# Patient Record
Sex: FEMALE | Race: WHITE | NOT HISPANIC OR LATINO | Employment: FULL TIME | URBAN - METROPOLITAN AREA
[De-identification: names, ages, dates, MRNs, and addresses within clinical notes are randomized per-mention and may not be internally consistent; named-entity substitution may affect disease eponyms.]

---
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
## 2023-01-08 DIAGNOSIS — F41.9 ANXIETY: ICD-10-CM

## 2023-01-18 PROBLEM — E78.1 HYPERTRIGLYCERIDEMIA: Status: ACTIVE | Noted: 2017-05-17

## 2023-01-20 ENCOUNTER — OFFICE VISIT (OUTPATIENT)
Dept: INTERNAL MEDICINE | Facility: CLINIC | Age: 18
End: 2023-01-20
Payer: COMMERCIAL

## 2023-01-20 ENCOUNTER — PRIOR AUTHORIZATION (OUTPATIENT)
Dept: INTERNAL MEDICINE | Facility: CLINIC | Age: 18
End: 2023-01-20

## 2023-01-20 VITALS
HEART RATE: 84 BPM | BODY MASS INDEX: 32.07 KG/M2 | SYSTOLIC BLOOD PRESSURE: 151 MMHG | TEMPERATURE: 97.6 F | HEIGHT: 62 IN | OXYGEN SATURATION: 98 % | DIASTOLIC BLOOD PRESSURE: 99 MMHG | WEIGHT: 174.25 LBS

## 2023-01-20 DIAGNOSIS — E78.1 HYPERTRIGLYCERIDEMIA: ICD-10-CM

## 2023-01-20 DIAGNOSIS — F33.0 MAJOR DEPRESSIVE DISORDER, RECURRENT EPISODE, MILD DEGREE: Chronic | ICD-10-CM

## 2023-01-20 DIAGNOSIS — E11.65 TYPE 2 DIABETES MELLITUS WITH HYPERGLYCEMIA, WITHOUT LONG-TERM CURRENT USE OF INSULIN: Chronic | ICD-10-CM

## 2023-01-20 DIAGNOSIS — F41.9 ANXIETY: Chronic | ICD-10-CM

## 2023-01-20 DIAGNOSIS — I10 HYPERTENSION, UNSPECIFIED TYPE: ICD-10-CM

## 2023-01-20 DIAGNOSIS — M25.50 POLYARTHRALGIA: Primary | ICD-10-CM

## 2023-01-20 LAB
ALBUMIN SERPL-MCNC: 4.4 G/DL (ref 3.5–5.2)
ALBUMIN/GLOB SERPL: 1.6 G/DL
ALP SERPL-CCNC: 88 U/L (ref 39–117)
ALT SERPL W P-5'-P-CCNC: 39 U/L (ref 1–33)
ANION GAP SERPL CALCULATED.3IONS-SCNC: 10 MMOL/L (ref 5–15)
AST SERPL-CCNC: 51 U/L (ref 1–32)
BASOPHILS # BLD AUTO: 0.05 10*3/MM3 (ref 0–0.2)
BASOPHILS NFR BLD AUTO: 0.5 % (ref 0–1.5)
BILIRUB SERPL-MCNC: 0.2 MG/DL (ref 0–1.2)
BUN SERPL-MCNC: 14 MG/DL (ref 6–20)
BUN/CREAT SERPL: 17.1 (ref 7–25)
CALCIUM SPEC-SCNC: 9.7 MG/DL (ref 8.6–10.5)
CHLORIDE SERPL-SCNC: 102 MMOL/L (ref 98–107)
CHOLEST SERPL-MCNC: 183 MG/DL (ref 0–200)
CHROMATIN AB SERPL-ACNC: <10 IU/ML (ref 0–14)
CK SERPL-CCNC: 35 U/L
CO2 SERPL-SCNC: 23 MMOL/L (ref 22–29)
CREAT SERPL-MCNC: 0.82 MG/DL (ref 0.57–1)
CRP SERPL-MCNC: 1.61 MG/DL (ref 0–0.5)
DEPRECATED RDW RBC AUTO: 42.4 FL (ref 37–54)
EGFRCR SERPLBLD CKD-EPI 2021: 105.8 ML/MIN/1.73
EOSINOPHIL # BLD AUTO: 0.15 10*3/MM3 (ref 0–0.4)
EOSINOPHIL NFR BLD AUTO: 1.6 % (ref 0.3–6.2)
ERYTHROCYTE [DISTWIDTH] IN BLOOD BY AUTOMATED COUNT: 13.9 % (ref 12.3–15.4)
ERYTHROCYTE [SEDIMENTATION RATE] IN BLOOD: 22 MM/HR (ref 0–20)
GLOBULIN UR ELPH-MCNC: 2.7 GM/DL
GLUCOSE SERPL-MCNC: 106 MG/DL (ref 65–99)
HBA1C MFR BLD: 7.7 % (ref 4.8–5.6)
HCT VFR BLD AUTO: 40.9 % (ref 34–46.6)
HDLC SERPL-MCNC: 27 MG/DL (ref 40–60)
HGB BLD-MCNC: 13.8 G/DL (ref 12–15.9)
IMM GRANULOCYTES # BLD AUTO: 0.03 10*3/MM3 (ref 0–0.05)
IMM GRANULOCYTES NFR BLD AUTO: 0.3 % (ref 0–0.5)
LDLC SERPL CALC-MCNC: 111 MG/DL (ref 0–100)
LDLC/HDLC SERPL: 3.87 {RATIO}
LYMPHOCYTES # BLD AUTO: 3.12 10*3/MM3 (ref 0.7–3.1)
LYMPHOCYTES NFR BLD AUTO: 34.1 % (ref 19.6–45.3)
MCH RBC QN AUTO: 28.6 PG (ref 26.6–33)
MCHC RBC AUTO-ENTMCNC: 33.7 G/DL (ref 31.5–35.7)
MCV RBC AUTO: 84.7 FL (ref 79–97)
MONOCYTES # BLD AUTO: 0.58 10*3/MM3 (ref 0.1–0.9)
MONOCYTES NFR BLD AUTO: 6.3 % (ref 5–12)
NEUTROPHILS NFR BLD AUTO: 5.21 10*3/MM3 (ref 1.7–7)
NEUTROPHILS NFR BLD AUTO: 57.2 % (ref 42.7–76)
NRBC BLD AUTO-RTO: 0 /100 WBC (ref 0–0.2)
PLATELET # BLD AUTO: 358 10*3/MM3 (ref 140–450)
PMV BLD AUTO: 10 FL (ref 6–12)
POTASSIUM SERPL-SCNC: 4.3 MMOL/L (ref 3.5–5.2)
PROT SERPL-MCNC: 7.1 G/DL (ref 6–8.5)
RBC # BLD AUTO: 4.83 10*6/MM3 (ref 3.77–5.28)
SODIUM SERPL-SCNC: 135 MMOL/L (ref 136–145)
TRIGL SERPL-MCNC: 258 MG/DL (ref 0–150)
TSH SERPL DL<=0.05 MIU/L-ACNC: 3.59 UIU/ML (ref 0.27–4.2)
VLDLC SERPL-MCNC: 45 MG/DL (ref 5–40)
WBC NRBC COR # BLD: 9.14 10*3/MM3 (ref 3.4–10.8)

## 2023-01-20 PROCEDURE — 99214 OFFICE O/P EST MOD 30 MIN: CPT | Performed by: NURSE PRACTITIONER

## 2023-01-20 PROCEDURE — 83036 HEMOGLOBIN GLYCOSYLATED A1C: CPT | Performed by: NURSE PRACTITIONER

## 2023-01-20 PROCEDURE — 82550 ASSAY OF CK (CPK): CPT | Performed by: NURSE PRACTITIONER

## 2023-01-20 PROCEDURE — 86431 RHEUMATOID FACTOR QUANT: CPT | Performed by: NURSE PRACTITIONER

## 2023-01-20 PROCEDURE — 85025 COMPLETE CBC W/AUTO DIFF WBC: CPT | Performed by: NURSE PRACTITIONER

## 2023-01-20 PROCEDURE — 80061 LIPID PANEL: CPT | Performed by: NURSE PRACTITIONER

## 2023-01-20 PROCEDURE — 86200 CCP ANTIBODY: CPT | Performed by: NURSE PRACTITIONER

## 2023-01-20 PROCEDURE — 86038 ANTINUCLEAR ANTIBODIES: CPT | Performed by: NURSE PRACTITIONER

## 2023-01-20 PROCEDURE — 86140 C-REACTIVE PROTEIN: CPT | Performed by: NURSE PRACTITIONER

## 2023-01-20 PROCEDURE — 85652 RBC SED RATE AUTOMATED: CPT | Performed by: NURSE PRACTITIONER

## 2023-01-20 PROCEDURE — 80053 COMPREHEN METABOLIC PANEL: CPT | Performed by: NURSE PRACTITIONER

## 2023-01-20 PROCEDURE — 84443 ASSAY THYROID STIM HORMONE: CPT | Performed by: NURSE PRACTITIONER

## 2023-01-20 RX ORDER — PROCHLORPERAZINE 25 MG/1
1 SUPPOSITORY RECTAL TAKE AS DIRECTED
Qty: 2 EACH | Refills: 3 | Status: SHIPPED | OUTPATIENT
Start: 2023-01-20

## 2023-01-20 RX ORDER — PROCHLORPERAZINE 25 MG/1
SUPPOSITORY RECTAL
Qty: 6 EACH | Refills: 2 | Status: SHIPPED | OUTPATIENT
Start: 2023-01-20

## 2023-01-20 RX ORDER — HYDROCHLOROTHIAZIDE 25 MG/1
25 TABLET ORAL DAILY
Qty: 90 TABLET | Refills: 1 | Status: SHIPPED | OUTPATIENT
Start: 2023-01-20

## 2023-01-20 RX ORDER — PROCHLORPERAZINE 25 MG/1
1 SUPPOSITORY RECTAL TAKE AS DIRECTED
Qty: 1 EACH | Refills: 2 | Status: SHIPPED | OUTPATIENT
Start: 2023-01-20

## 2023-01-20 NOTE — TELEPHONE ENCOUNTER
PA REQUIRED FOR FOLLOWING MEDICATION:    empagliflozin (Jardiance) 10 MG tablet tablet (01/20/2023)    INDEXED VIA ONBASE

## 2023-01-20 NOTE — TELEPHONE ENCOUNTER
PA NEEDED FOR FOLLOWING MEDICATION:    Continuous Blood Gluc Transmit (Dexcom G6 Transmitter) misc (01/20/2023)

## 2023-01-20 NOTE — PROGRESS NOTES
Chief Complaint  Anxiety, Blood Sugar Problem (Was seen at [facility] for high blood sugar, has been blacking out because of blood sugar, wanting a dexcom to be able to continuously monitor, wanting to change Metformin, had been throwing up blood, needing more Ozempic, left in car and medication froze), Arthritis (Wanting to be tested for arthritis ), and Contraception (Wanting to discuss the shot or a different BC)    Subjective          Amarilis Vinson presents to [facility]  Diabetes  She presents for her follow-up diabetic visit. She has type 2 diabetes mellitus. Her disease course has been improving. Hypoglycemia symptoms include dizziness. (Checks glucose during these times and she is not hypoglycemic ) Associated symptoms include fatigue. There are no hypoglycemic complications. There are no diabetic complications. Risk factors for coronary artery disease include family history, obesity, hypertension, sedentary lifestyle and stress. Current diabetic treatments: unable to tolerate metformin. She is compliant with treatment all of the time. Her weight is stable. She is following a diabetic diet. When asked about meal planning, she reported none. She never participates in exercise. Her home blood glucose trend is decreasing steadily. Her overall blood glucose range is 140-180 mg/dl. An ACE inhibitor/angiotensin II receptor blocker is being taken. She does not see a podiatrist.Eye exam is not current.       Depression  Visit Type: follow up   Progression since onset: improving   Patient presents with the following symptoms: decreased concentration, depressed mood, excessive worry, fatigue, irritability, nervousness/anxiety and suicidal ideas.  Patient is not experiencing: anhedonia, chest pain, choking sensation, compulsions, confusion, dizziness, dry mouth, feelings of hopelessness, feelings of worthlessness, hypersomnia, hyperventilation, impotence,  insomnia, malaise, memory impairment, muscle tension, nausea, obsessions, palpitations, panic, psychomotor agitation, psychomotor retardation, restlessness, shortness of breath, suicidal planning, thoughts of death and weight gain.  Severity: mild     Hypertension  This is a chronic problem. Associated symptoms include headaches. Pertinent negatives include no anxiety, blurred vision, chest pain, malaise/fatigue, neck pain, orthopnea, palpitations, peripheral edema, PND, shortness of breath or sweats. Risk factors for coronary artery disease include obesity. Current antihypertension treatment includes ACE inhibitors. The current treatment provides mild improvement. Compliance problems include diet and exercise.      Anxiety  Presents for follow-up  visit. Symptoms include depressed mood, excessive worry, irritability and nervous/anxious behavior. Patient reports no chest pain, compulsions, confusion, decreased concentration, dizziness, dry mouth, feeling of choking, hyperventilation, impotence, insomnia, malaise, muscle tension, nausea, obsessions, palpitations, panic, restlessness, shortness of breath or suicidal ideas. The quality of sleep is fair.     Pt reports polyarthralgia that has been going on for several months. Was recently seen at .   Denies fevers, numbness, tingling.     Current Outpatient Medications   Medication Instructions   • aspirin-acetaminophen-caffeine (EXCEDRIN MIGRAINE) 250-250-65 MG per tablet Oral   • Continuous Blood Gluc  (Dexcom G6 ) device 1 each, Does not apply, Take As Directed   • Continuous Blood Gluc Sensor (Dexcom G6 Sensor) Does not apply, Every 10 Days   • Continuous Blood Gluc Transmit (Dexcom G6 Transmitter) misc 1 each, Does not apply, Take As Directed   • dicyclomine (BENTYL) 20 mg, Oral, Every 6 Hours   • empagliflozin (JARDIANCE) 10 mg, Oral, Daily   • glucose blood test strip Use three times per day as needed   • glucose monitoring kit (FREESTYLE)  "monitoring kit 1 each, Does not apply, 3 Times Daily PRN   • hydroCHLOROthiazide (HYDRODIURIL) 25 mg, Oral, Daily   • ibuprofen (ADVIL,MOTRIN) 200 MG tablet 2 tablets by mouth prn   • Lancets (freestyle) lancets Use three times per day as needed to check blood sugars   • lisinopril (PRINIVIL,ZESTRIL) 40 mg, Oral, Daily   • Lo Loestrin Fe 1 MG-10 MCG / 10 MCG tablet 1 tablet, Oral, Daily   • metFORMIN (GLUCOPHAGE) 500 mg, Oral, 2 Times Daily With Meals   • ondansetron ODT (ZOFRAN-ODT) 4 mg, Translingual, Every 8 Hours PRN   • Rimegepant Sulfate (NURTEC) 75 mg, Oral, Every 48 Hours PRN   • Semaglutide(0.25 or 0.5MG/DOS) (OZEMPIC) 0.5 mg, Subcutaneous, Weekly   • sertraline (ZOLOFT) 100 mg, Oral, Daily   • traMADol (ULTRAM) 50 mg, Oral, Every 8 Hours PRN, Take 1 tablet by mouth every 8 hours as needed for moderate pain.       The following portions of the patient's history were reviewed and updated as appropriate: allergies, current medications, past family history, past medical history, past social history, past surgical history, and problem list.    Objective   Vital Signs:   /99 (BP Location: Right arm, Patient Position: Sitting, Cuff Size: Adult)   Pulse 84   Temp 97.6 °F (36.4 °C) (Temporal)   Ht 157.5 cm (62\")   Wt 79 kg (174 lb 4 oz)   SpO2 98%   BMI 31.87 kg/m²     Wt Readings from Last 3 Encounters:   01/20/23 79 kg (174 lb 4 oz) (93 %, Z= 1.47)*   12/24/22 77.5 kg (170 lb 13.7 oz) (92 %, Z= 1.40)*   12/09/22 80 kg (176 lb 6 oz) (94 %, Z= 1.52)*     * Growth percentiles are based on [facility] (Girls, 2-20 Years) data.     BP Readings from Last 3 Encounters:   01/20/23 151/99   01/18/23 132/98   12/24/22 146/97     Physical Exam  Constitutional:       Appearance: She is obese.        Appearance: No acute distress, well-nourished  Head: normocephalic, atraumatic  Eyes: extraocular movements intact, no scleral icterus, no conjunctival injection  Ears, Nose, and Throat: external ears normal, nares patent, " moist mucous membranes  Cardiovascular: regular rate and rhythm. no murmurs, rubs, or gallops. no edema  Respiratory: breathing comfortably, symmetric chest rise, clear to auscultation bilaterally. No wheezes, rales, or rhonchi.  Neuro: alert and oriented to time, place, and person. Normal gait  Psych: normal mood and affect     Result Review :   The following data was reviewed by: JOSE Lechuga on 01/20/2023:  Common labs    Common Labs 12/9/22 12/24/22 12/24/22 1/20/23 1/20/23 1/20/23 1/20/23     0058 0058 1222 1222 1222 1222   Glucose   127 (A)    106 (A)   BUN   17    14   Creatinine   0.88    0.82   Sodium   134 (A)    135 (A)   Potassium   4.7    4.3   Chloride   97 (A)    102   Calcium   9.9    9.7   Albumin   4.40    4.4   Total Bilirubin   0.2    0.2   Alkaline Phosphatase   110    88   AST (SGOT)   45 (A)    51 (A)   ALT (SGPT)   34 (A)    39 (A)   WBC  14.07 (A)  9.14      Hemoglobin  14.9  13.8      Hematocrit  45.1  40.9      Platelets  373  358      Total Cholesterol      183    Triglycerides      258 (A)    HDL Cholesterol      27 (A)    LDL Cholesterol       111 (A)    Hemoglobin A1C     7.70 (A)     Microalbumin, Urine 2.4         (A) Abnormal value       Comments are available for some flowsheets but are not being displayed.                  Lab Results   Component Value Date    SARSANTIGEN Not Detected 01/18/2023    COVID19 NOT DETECTED 04/05/2021    RAPFLUA Negative 01/18/2023    RAPFLUB Negative 01/18/2023    POCPREGUR Negative 10/12/2022    BILIRUBINUR Negative 11/30/2022       Procedures        Assessment and Plan    Diagnoses and all orders for this visit:    1. Polyarthralgia (Primary)  -     MONTY Direct Reflex to 11 Biomarker  -     Cyclic citrul peptide antibody, IgG/IgA  -     CK  -     C-reactive protein  -     Rheumatoid factor  -     Sedimentation rate    2. Hypertension, unspecified type  Comments:  increase HCTZ to 25   Orders:  -     hydroCHLOROthiazide (HYDRODIURIL) 25  MG tablet; Take 1 tablet by mouth Daily.  Dispense: 90 tablet; Refill: 1  -     CBC & Differential    3. Type 2 diabetes mellitus with hyperglycemia, without long-term current use of insulin (HCC)  Comments:  adding jardiance to regimen as pt was unable to tolerate metformin   Orders:  -     CBC & Differential  -     Continuous Blood Gluc  (Dexcom G6 ) device; 1 each Take As Directed.  Dispense: 1 each; Refill: 2  -     Continuous Blood Gluc Sensor (Dexcom G6 Sensor); Every 10 (Ten) Days.  Dispense: 6 each; Refill: 2  -     Continuous Blood Gluc Transmit (Dexcom G6 Transmitter) misc; 1 each Take As Directed.  Dispense: 2 each; Refill: 3  -     empagliflozin (Jardiance) 10 MG tablet tablet; Take 1 tablet by mouth Daily.  Dispense: 90 tablet; Refill: 0  -     Hemoglobin A1c  -     Semaglutide,0.25 or 0.5MG/DOS, (OZEMPIC) 2 MG/1.5ML solution pen-injector; Inject 0.5 mg under the skin into the appropriate area as directed 1 (One) Time Per Week.  Dispense: 1.5 mL; Refill: 0    4. Major depressive disorder, recurrent episode, mild degree (HCC)  Comments:  well controlled on current regimen  Orders:  -     TSH Rfx On Abnormal To Free T4  -     Comprehensive Metabolic Panel  -     CBC & Differential    5. Hypertriglyceridemia  -     Lipid Panel    6. Anxiety  Comments:  well controlled on current regimen  Orders:  -     TSH Rfx On Abnormal To Free T4  -     Comprehensive Metabolic Panel          Medications Discontinued During This Encounter   Medication Reason   • hydroCHLOROthiazide (HYDRODIURIL) 12.5 MG oral    • Semaglutide,0.25 or 0.5MG/DOS, (OZEMPIC) 2 MG/1.5ML solution pen-injector           Follow Up   Return in about 4 weeks (around 2/17/2023) for Hypertension.  Patient was given instructions and counseling regarding her condition or for health maintenance advice. Please see specific information pulled into the AVS if appropriate.       Jasmin Vickers, JOSE  01/23/23  08:36 EST

## 2023-01-23 ENCOUNTER — TELEPHONE (OUTPATIENT)
Dept: INTERNAL MEDICINE | Facility: CLINIC | Age: 18
End: 2023-01-23
Payer: COMMERCIAL

## 2023-01-23 LAB
ANA SER QL: NEGATIVE
CCP IGA+IGG SERPL IA-ACNC: 1 UNITS (ref 0–19)

## 2023-01-23 NOTE — TELEPHONE ENCOUNTER
Caller: Amarilis Vinson    Relationship to patient: Self    Best call back number: [phone]    Patient is needing: PATIENT IS NEEDING PRIOR AUTHORIZATION FOR Continuous Blood Gluc Transmit (Dexcom G6 Transmitter) misc AND EVERYTHING THAT GOES WITH THAT. PLEASE ADVISE PATIENT AS TO WHEN THIS WILL BE DONE. PATIENT ASKED TO NOT CALL UNTIL AFTER 3PM TODAY           3

## 2023-01-26 ENCOUNTER — TELEPHONE (OUTPATIENT)
Dept: INTERNAL MEDICINE | Facility: CLINIC | Age: 18
End: 2023-01-26
Payer: COMMERCIAL

## 2023-01-26 NOTE — TELEPHONE ENCOUNTER
----- Message from JOSE Lechuga sent at 1/26/2023  9:42 AM EST -----  Liver function slightly elevated, but stable from previous.   Recommend healthy diet and increasing physical activity.   Avoid excessive tylenol use and alcohol.   Cholesterol levels elevated.   It is recommended with patient's diabetes that she be on a statin. I will send to pharmacy.     A1C down to 7.7, very close to goal! I would like to see her under 7.   Jardiance was approved by insurance, please start taking daily and we will recheck at the 3 month courtney.     Mildly elevated inflammatory markers. Would like to repeat these at next visit.

## 2023-03-03 ENCOUNTER — OFFICE VISIT (OUTPATIENT)
Dept: INTERNAL MEDICINE | Facility: CLINIC | Age: 18
End: 2023-03-03
Payer: COMMERCIAL

## 2023-03-03 VITALS
SYSTOLIC BLOOD PRESSURE: 170 MMHG | HEART RATE: 108 BPM | DIASTOLIC BLOOD PRESSURE: 142 MMHG | BODY MASS INDEX: 32.04 KG/M2 | HEIGHT: 62 IN | WEIGHT: 174.13 LBS | OXYGEN SATURATION: 98 % | TEMPERATURE: 97.8 F

## 2023-03-03 DIAGNOSIS — E78.1 HYPERTRIGLYCERIDEMIA: ICD-10-CM

## 2023-03-03 DIAGNOSIS — E66.9 CLASS 1 OBESITY WITH SERIOUS COMORBIDITY AND BODY MASS INDEX (BMI) OF 32.0 TO 32.9 IN ADULT, UNSPECIFIED OBESITY TYPE: ICD-10-CM

## 2023-03-03 DIAGNOSIS — I10 HYPERTENSION, UNSPECIFIED TYPE: ICD-10-CM

## 2023-03-03 DIAGNOSIS — F33.0 MAJOR DEPRESSIVE DISORDER, RECURRENT EPISODE, MILD DEGREE: Primary | ICD-10-CM

## 2023-03-03 DIAGNOSIS — E11.65 TYPE 2 DIABETES MELLITUS WITH HYPERGLYCEMIA, WITHOUT LONG-TERM CURRENT USE OF INSULIN: ICD-10-CM

## 2023-03-03 PROCEDURE — 99214 OFFICE O/P EST MOD 30 MIN: CPT | Performed by: NURSE PRACTITIONER

## 2023-03-03 NOTE — PROGRESS NOTES
Chief Complaint  Hypertension and Diabetes    Subjective          Amarilis Vinson presents to [facility]  History of Present Illness    Diabetes  She presents for her follow-up diabetic visit. She has type 2 diabetes mellitus. Her disease course has been improving. Hypoglycemia symptoms include dizziness. (Checks glucose during these times and she is not hypoglycemic ) Associated symptoms include fatigue. There are no hypoglycemic complications. There are no diabetic complications. Risk factors for coronary artery disease include family history, obesity, hypertension, sedentary lifestyle and stress. Current diabetic treatments: unable to tolerate metformin. She is compliant with treatment all of the time. Her weight is stable. She is following a diabetic diet. When asked about meal planning, she reported none. She never participates in exercise. Her home blood glucose trend is decreasing steadily. Her overall blood glucose range is 140-180 mg/dl. An ACE inhibitor/angiotensin II receptor blocker is being taken. She does not see a podiatrist.Eye exam is not current.       Depression  Visit Type: follow up   Progression since onset: improving   Patient presents with the following symptoms: decreased concentration, depressed mood, excessive worry, fatigue, irritability, nervousness/anxiety and suicidal ideas.  Patient is not experiencing: anhedonia, chest pain, choking sensation, compulsions, confusion, dizziness, dry mouth, feelings of hopelessness, feelings of worthlessness, hypersomnia, hyperventilation, impotence, insomnia, malaise, memory impairment, muscle tension, nausea, obsessions, palpitations, panic, psychomotor agitation, psychomotor retardation, restlessness, shortness of breath, suicidal planning, thoughts of death and weight gain.  Severity: mild     Hypertension  This is a chronic problem. Associated symptoms include headaches. Pertinent negatives include no  anxiety, blurred vision, chest pain, malaise/fatigue, neck pain, orthopnea, palpitations, peripheral edema, PND, shortness of breath or sweats. Risk factors for coronary artery disease include obesity. Current antihypertension treatment includes ACE inhibitors. The current treatment provides mild improvement. Compliance problems include diet and exercise.    Has been running 120's at home.       Anxiety  Presents for follow-up  visit. Symptoms include depressed mood, excessive worry, irritability and nervous/anxious behavior. Patient reports no chest pain, compulsions, confusion, decreased concentration, dizziness, dry mouth, feeling of choking, hyperventilation, impotence, insomnia, malaise, muscle tension, nausea, obsessions, palpitations, panic, restlessness, shortness of breath or suicidal ideas. The quality of sleep is fair.     Pt reports polyarthralgia that has been going on for several months. Was recently seen at .   Denies fevers, numbness, tingling.         Current Outpatient Medications   Medication Instructions   • aspirin-acetaminophen-caffeine (EXCEDRIN MIGRAINE) 250-250-65 MG per tablet Oral   • Continuous Blood Gluc  (Dexcom G6 ) device 1 each, Does not apply, Take As Directed   • Continuous Blood Gluc Sensor (Dexcom G6 Sensor) Does not apply, Every 10 Days   • Continuous Blood Gluc Transmit (Dexcom G6 Transmitter) misc 1 each, Does not apply, Take As Directed   • dicyclomine (BENTYL) 20 mg, Oral, Every 6 Hours   • empagliflozin (JARDIANCE) 10 mg, Oral, Daily   • glucose blood test strip Use three times per day as needed   • glucose monitoring kit (FREESTYLE) monitoring kit 1 each, Does not apply, 3 Times Daily PRN   • hydroCHLOROthiazide (HYDRODIURIL) 25 mg, Oral, Daily   • ibuprofen (ADVIL,MOTRIN) 200 MG tablet 2 tablets by mouth prn   • Lancets (freestyle) lancets Use three times per day as needed to check blood sugars   • lisinopril (PRINIVIL,ZESTRIL) 40 mg, Oral, Daily   • Lo  "Loestrin Fe 1 MG-10 MCG / 10 MCG tablet 1 tablet, Oral, Daily   • ondansetron ODT (ZOFRAN-ODT) 4 mg, Translingual, Every 8 Hours PRN   • Rimegepant Sulfate (NURTEC) 75 mg, Oral, Every 48 Hours PRN   • Semaglutide(0.25 or 0.5MG/DOS) (OZEMPIC) 0.25 mg, Subcutaneous, Weekly   • sertraline (ZOLOFT) 100 mg, Oral, Daily   • traMADol (ULTRAM) 50 mg, Oral, Every 8 Hours PRN, Take 1 tablet by mouth every 8 hours as needed for moderate pain.       The following portions of the patient's history were reviewed and updated as appropriate: allergies, current medications, past family history, past medical history, past social history, past surgical history, and problem list.    Objective   Vital Signs:   BP (!) 170/142 (BP Location: Left arm, Patient Position: Sitting, Cuff Size: Adult)   Pulse 108   Temp 97.8 °F (36.6 °C) (Temporal)   Ht 157.5 cm (62\")   Wt 79 kg (174 lb 2 oz)   SpO2 98%   BMI 31.85 kg/m²     Wt Readings from Last 3 Encounters:   03/03/23 79 kg (174 lb 2 oz)   01/20/23 79 kg (174 lb 4 oz) (93 %, Z= 1.47)*   12/24/22 77.5 kg (170 lb 13.7 oz) (92 %, Z= 1.40)*     * Growth percentiles are based on CDC (Girls, 2-20 Years) data.     BP Readings from Last 3 Encounters:   03/03/23 (!) 170/142   01/20/23 151/99   01/18/23 132/98     Physical Exam  HENT:      Mouth/Throat:      Pharynx: No posterior oropharyngeal erythema.   Cardiovascular:      Pulses:           Dorsalis pedis pulses are 2+ on the right side and 2+ on the left side.   Feet:      Right foot:      Protective Sensation: 3 sites tested. 3 sites sensed.      Skin integrity: Skin integrity normal. No ulcer or blister.      Toenail Condition: Right toenails are normal.      Left foot:      Protective Sensation: 3 sites tested. 3 sites sensed.      Skin integrity: Skin integrity normal. No ulcer or blister.      Toenail Condition: Left toenails are normal.      Comments:           Appearance: No acute distress, well-nourished  Head: normocephalic, " atraumatic  Eyes: extraocular movements intact, no scleral icterus, no conjunctival injection  Ears, Nose, and Throat: external ears normal, nares patent, moist mucous membranes  Cardiovascular: regular rate and rhythm. no murmurs, rubs, or gallops. no edema  Respiratory: breathing comfortably, symmetric chest rise, clear to auscultation bilaterally. No wheezes, rales, or rhonchi.  Neuro: alert and oriented to time, place, and person. Normal gait  Psych: normal mood and affect     Result Review :   The following data was reviewed by: JOSE Lechuga on 03/03/2023:  Common labs    Common Labs 12/9/22 12/24/22 12/24/22 1/20/23 1/20/23 1/20/23 1/20/23     0058 0058 1222 1222 1222 1222   Glucose   127 (A)    106 (A)   BUN   17    14   Creatinine   0.88    0.82   Sodium   134 (A)    135 (A)   Potassium   4.7    4.3   Chloride   97 (A)    102   Calcium   9.9    9.7   Albumin   4.40    4.4   Total Bilirubin   0.2    0.2   Alkaline Phosphatase   110    88   AST (SGOT)   45 (A)    51 (A)   ALT (SGPT)   34 (A)    39 (A)   WBC  14.07 (A)  9.14      Hemoglobin  14.9  13.8      Hematocrit  45.1  40.9      Platelets  373  358      Total Cholesterol      183    Triglycerides      258 (A)    HDL Cholesterol      27 (A)    LDL Cholesterol       111 (A)    Hemoglobin A1C     7.70 (A)     Microalbumin, Urine 2.4         (A) Abnormal value       Comments are available for some flowsheets but are not being displayed.                  Lab Results   Component Value Date    SARSANTIGEN Not Detected 01/18/2023    COVID19 NOT DETECTED 04/05/2021    RAPFLUA Negative 01/18/2023    RAPFLUB Negative 01/18/2023    POCPREGUR Negative 10/12/2022    BILIRUBINUR Negative 11/30/2022       Procedures        Assessment and Plan    Diagnoses and all orders for this visit:    1. Major depressive disorder, recurrent episode, mild degree (HCC) (Primary)    2. Class 1 obesity with serious comorbidity and body mass index (BMI) of 32.0 to 32.9 in  adult, unspecified obesity type  Assessment & Plan:  Patient's (Body mass index is 31.85 kg/m².) indicates that they are obese (BMI >30) with health conditions that include hypertension, diabetes mellitus and dyslipidemias . Weight is unchanged. BMI  is above average; BMI management plan is completed. We discussed low calorie, low carb based diet program, portion control and increasing exercise.       3. Type 2 diabetes mellitus with hyperglycemia, without long-term current use of insulin (HCC)  Assessment & Plan:  Diabetes is has not picked up jardiance. .   Continue current treatment regimen.  Reminded to bring in blood sugar diary at next visit.  Dietary recommendations for ADA diet.  Regular aerobic exercise.  Discussed ways to avoid symptomatic hypoglycemia.  Discussed sick day management.  Discussed foot care.  Diabetes will be reassessed in 3 months.    Orders:  -     Semaglutide,0.25 or 0.5MG/DOS, (OZEMPIC) 2 MG/1.5ML solution pen-injector; Inject 0.25 mg under the skin into the appropriate area as directed 1 (One) Time Per Week.  Dispense: 2 mL; Refill: 1    4. Hypertension, unspecified type  Assessment & Plan:  Hypertension is uncontrolled. has not been taking lisinopril .  Continue current treatment regimen.  Dietary sodium restriction.  Weight loss.  Regular aerobic exercise.  Blood pressure will be reassessed in 4 weeks.    Takes meds as prescribed.       5. Hypertriglyceridemia        Medications Discontinued During This Encounter   Medication Reason   • metFORMIN (Glucophage) 500 MG tablet *Therapy completed   • Semaglutide,0.25 or 0.5MG/DOS, (OZEMPIC) 2 MG/1.5ML solution pen-injector           Follow Up   Return in about 4 weeks (around 3/31/2023).  Patient was given instructions and counseling regarding her condition or for health maintenance advice. Please see specific information pulled into the AVS if appropriate.       Jasmin Vickers, JOSE  03/03/23  12:22 EST

## 2023-03-03 NOTE — ASSESSMENT & PLAN NOTE
Diabetes is has not picked up jardiance. .   Continue current treatment regimen.  Reminded to bring in blood sugar diary at next visit.  Dietary recommendations for ADA diet.  Regular aerobic exercise.  Discussed ways to avoid symptomatic hypoglycemia.  Discussed sick day management.  Discussed foot care.  Diabetes will be reassessed in 3 months.

## 2023-03-03 NOTE — ASSESSMENT & PLAN NOTE
Patient's (Body mass index is 31.85 kg/m².) indicates that they are obese (BMI >30) with health conditions that include hypertension, diabetes mellitus and dyslipidemias . Weight is unchanged. BMI  is above average; BMI management plan is completed. We discussed low calorie, low carb based diet program, portion control and increasing exercise.

## 2023-03-03 NOTE — ASSESSMENT & PLAN NOTE
Hypertension is uncontrolled. has not been taking lisinopril .  Continue current treatment regimen.  Dietary sodium restriction.  Weight loss.  Regular aerobic exercise.  Blood pressure will be reassessed in 4 weeks.    Takes meds as prescribed.

## 2023-03-21 ENCOUNTER — APPOINTMENT (OUTPATIENT)
Dept: CT IMAGING | Facility: HOSPITAL | Age: 18
End: 2023-03-21
Payer: COMMERCIAL

## 2023-03-21 ENCOUNTER — HOSPITAL ENCOUNTER (EMERGENCY)
Facility: HOSPITAL | Age: 18
Discharge: HOME | End: 2023-03-22
Attending: EMERGENCY MEDICINE | Admitting: EMERGENCY MEDICINE
Payer: COMMERCIAL

## 2023-03-21 DIAGNOSIS — K57.92 DIVERTICULITIS: ICD-10-CM

## 2023-03-21 DIAGNOSIS — R11.2 NAUSEA AND VOMITING, UNSPECIFIED VOMITING TYPE: ICD-10-CM

## 2023-03-21 DIAGNOSIS — I10 HYPERTENSION, UNSPECIFIED TYPE: Primary | ICD-10-CM

## 2023-03-21 DIAGNOSIS — R10.30 LOWER ABDOMINAL PAIN: ICD-10-CM

## 2023-03-21 LAB
ALBUMIN SERPL-MCNC: 4.3 G/DL (ref 3.5–5.2)
ALBUMIN/GLOB SERPL: 1.3 G/DL
ALP SERPL-CCNC: 105 U/L (ref 39–117)
ALT SERPL W P-5'-P-CCNC: 84 U/L (ref 1–33)
ANION GAP SERPL CALCULATED.3IONS-SCNC: 15 MMOL/L (ref 5–15)
AST SERPL-CCNC: 213 U/L (ref 1–32)
BASOPHILS # BLD AUTO: 0.03 10*3/MM3 (ref 0–0.2)
BASOPHILS NFR BLD AUTO: 0.3 % (ref 0–1.5)
BILIRUB SERPL-MCNC: 0.3 MG/DL (ref 0–1.2)
BILIRUB UR QL STRIP: NEGATIVE
BUN SERPL-MCNC: 13 MG/DL (ref 6–20)
BUN/CREAT SERPL: 14.4 (ref 7–25)
CALCIUM SPEC-SCNC: 9.4 MG/DL (ref 8.6–10.5)
CHLORIDE SERPL-SCNC: 101 MMOL/L (ref 98–107)
CLARITY UR: CLEAR
CO2 SERPL-SCNC: 20 MMOL/L (ref 22–29)
COLOR UR: YELLOW
CREAT SERPL-MCNC: 0.9 MG/DL (ref 0.57–1)
DEPRECATED RDW RBC AUTO: 33.4 FL (ref 37–54)
EGFRCR SERPLBLD CKD-EPI 2021: 94.1 ML/MIN/1.73
EOSINOPHIL # BLD AUTO: 0.07 10*3/MM3 (ref 0–0.4)
EOSINOPHIL NFR BLD AUTO: 0.7 % (ref 0.3–6.2)
ERYTHROCYTE [DISTWIDTH] IN BLOOD BY AUTOMATED COUNT: 11.6 % (ref 12.3–15.4)
GLOBULIN UR ELPH-MCNC: 3.3 GM/DL
GLUCOSE BLDC GLUCOMTR-MCNC: 128 MG/DL (ref 70–99)
GLUCOSE SERPL-MCNC: 119 MG/DL (ref 65–99)
GLUCOSE UR STRIP-MCNC: ABNORMAL MG/DL
HCG INTACT+B SERPL-ACNC: <0.5 MIU/ML
HCT VFR BLD AUTO: 44.4 % (ref 34–46.6)
HGB BLD-MCNC: 15.9 G/DL (ref 12–15.9)
HGB UR QL STRIP.AUTO: NEGATIVE
HOLD SPECIMEN: NORMAL
HOLD SPECIMEN: NORMAL
IMM GRANULOCYTES # BLD AUTO: 0.03 10*3/MM3 (ref 0–0.05)
IMM GRANULOCYTES NFR BLD AUTO: 0.3 % (ref 0–0.5)
KETONES UR QL STRIP: ABNORMAL
LEUKOCYTE ESTERASE UR QL STRIP.AUTO: NEGATIVE
LIPASE SERPL-CCNC: 18 U/L (ref 13–60)
LYMPHOCYTES # BLD AUTO: 1.65 10*3/MM3 (ref 0.7–3.1)
LYMPHOCYTES NFR BLD AUTO: 15.5 % (ref 19.6–45.3)
MCH RBC QN AUTO: 28.5 PG (ref 26.6–33)
MCHC RBC AUTO-ENTMCNC: 35.8 G/DL (ref 31.5–35.7)
MCV RBC AUTO: 79.6 FL (ref 79–97)
MONOCYTES # BLD AUTO: 0.73 10*3/MM3 (ref 0.1–0.9)
MONOCYTES NFR BLD AUTO: 6.9 % (ref 5–12)
NEUTROPHILS NFR BLD AUTO: 76.3 % (ref 42.7–76)
NEUTROPHILS NFR BLD AUTO: 8.14 10*3/MM3 (ref 1.7–7)
NITRITE UR QL STRIP: NEGATIVE
NRBC BLD AUTO-RTO: 0 /100 WBC (ref 0–0.2)
PH UR STRIP.AUTO: 5.5 [PH] (ref 5–8)
PLATELET # BLD AUTO: 307 10*3/MM3 (ref 140–450)
PMV BLD AUTO: 9.3 FL (ref 6–12)
POTASSIUM SERPL-SCNC: 4 MMOL/L (ref 3.5–5.2)
PROT SERPL-MCNC: 7.6 G/DL (ref 6–8.5)
PROT UR QL STRIP: ABNORMAL
RBC # BLD AUTO: 5.58 10*6/MM3 (ref 3.77–5.28)
SODIUM SERPL-SCNC: 136 MMOL/L (ref 136–145)
SP GR UR STRIP: >1.03 (ref 1–1.03)
UROBILINOGEN UR QL STRIP: ABNORMAL
WBC NRBC COR # BLD: 10.65 10*3/MM3 (ref 3.4–10.8)
WHOLE BLOOD HOLD COAG: NORMAL
WHOLE BLOOD HOLD SPECIMEN: NORMAL

## 2023-03-21 PROCEDURE — 96374 THER/PROPH/DIAG INJ IV PUSH: CPT

## 2023-03-21 PROCEDURE — 81003 URINALYSIS AUTO W/O SCOPE: CPT

## 2023-03-21 PROCEDURE — 99284 EMERGENCY DEPT VISIT MOD MDM: CPT

## 2023-03-21 PROCEDURE — 82962 GLUCOSE BLOOD TEST: CPT

## 2023-03-21 PROCEDURE — 85025 COMPLETE CBC W/AUTO DIFF WBC: CPT

## 2023-03-21 PROCEDURE — 36415 COLL VENOUS BLD VENIPUNCTURE: CPT

## 2023-03-21 PROCEDURE — 96361 HYDRATE IV INFUSION ADD-ON: CPT

## 2023-03-21 PROCEDURE — 80053 COMPREHEN METABOLIC PANEL: CPT

## 2023-03-21 PROCEDURE — 83690 ASSAY OF LIPASE: CPT

## 2023-03-21 PROCEDURE — 96375 TX/PRO/DX INJ NEW DRUG ADDON: CPT

## 2023-03-21 PROCEDURE — 84702 CHORIONIC GONADOTROPIN TEST: CPT

## 2023-03-21 RX ORDER — SODIUM CHLORIDE 0.9 % (FLUSH) 0.9 %
10 SYRINGE (ML) INJECTION AS NEEDED
Status: DISCONTINUED | OUTPATIENT
Start: 2023-03-21 | End: 2023-03-22 | Stop reason: HOSPADM

## 2023-03-22 ENCOUNTER — APPOINTMENT (OUTPATIENT)
Dept: CT IMAGING | Facility: HOSPITAL | Age: 18
End: 2023-03-22
Payer: COMMERCIAL

## 2023-03-22 VITALS
WEIGHT: 173.06 LBS | HEART RATE: 100 BPM | HEIGHT: 62 IN | TEMPERATURE: 98.3 F | OXYGEN SATURATION: 95 % | BODY MASS INDEX: 31.85 KG/M2 | SYSTOLIC BLOOD PRESSURE: 133 MMHG | RESPIRATION RATE: 20 BRPM | DIASTOLIC BLOOD PRESSURE: 90 MMHG

## 2023-03-22 PROCEDURE — 25010000002 ONDANSETRON PER 1 MG: Performed by: NURSE PRACTITIONER

## 2023-03-22 PROCEDURE — 74177 CT ABD & PELVIS W/CONTRAST: CPT

## 2023-03-22 PROCEDURE — 25010000002 KETOROLAC TROMETHAMINE PER 15 MG: Performed by: NURSE PRACTITIONER

## 2023-03-22 PROCEDURE — 25010000002 HYDRALAZINE PER 20 MG: Performed by: NURSE PRACTITIONER

## 2023-03-22 PROCEDURE — 25510000001 IOPAMIDOL PER 1 ML: Performed by: EMERGENCY MEDICINE

## 2023-03-22 RX ORDER — ONDANSETRON 4 MG/1
4 TABLET, ORALLY DISINTEGRATING ORAL EVERY 4 HOURS PRN
Qty: 15 TABLET | Refills: 0 | Status: SHIPPED | OUTPATIENT
Start: 2023-03-22

## 2023-03-22 RX ORDER — KETOROLAC TROMETHAMINE 30 MG/ML
30 INJECTION, SOLUTION INTRAMUSCULAR; INTRAVENOUS ONCE
Status: COMPLETED | OUTPATIENT
Start: 2023-03-22 | End: 2023-03-22

## 2023-03-22 RX ORDER — AMOXICILLIN AND CLAVULANATE POTASSIUM 875; 125 MG/1; MG/1
1 TABLET, FILM COATED ORAL ONCE
Status: COMPLETED | OUTPATIENT
Start: 2023-03-22 | End: 2023-03-22

## 2023-03-22 RX ORDER — HYDRALAZINE HYDROCHLORIDE 20 MG/ML
20 INJECTION INTRAMUSCULAR; INTRAVENOUS ONCE
Status: COMPLETED | OUTPATIENT
Start: 2023-03-22 | End: 2023-03-22

## 2023-03-22 RX ORDER — ONDANSETRON 2 MG/ML
4 INJECTION INTRAMUSCULAR; INTRAVENOUS ONCE
Status: COMPLETED | OUTPATIENT
Start: 2023-03-22 | End: 2023-03-22

## 2023-03-22 RX ORDER — DICYCLOMINE HYDROCHLORIDE 10 MG/1
20 CAPSULE ORAL ONCE
Status: COMPLETED | OUTPATIENT
Start: 2023-03-22 | End: 2023-03-22

## 2023-03-22 RX ORDER — AMOXICILLIN AND CLAVULANATE POTASSIUM 875; 125 MG/1; MG/1
1 TABLET, FILM COATED ORAL 2 TIMES DAILY
Qty: 20 TABLET | Refills: 0 | Status: SHIPPED | OUTPATIENT
Start: 2023-03-22 | End: 2023-04-01

## 2023-03-22 RX ORDER — DICYCLOMINE HCL 20 MG
20 TABLET ORAL EVERY 6 HOURS
Qty: 20 TABLET | Refills: 0 | Status: SHIPPED | OUTPATIENT
Start: 2023-03-22

## 2023-03-22 RX ADMIN — DICYCLOMINE HYDROCHLORIDE 20 MG: 10 CAPSULE ORAL at 03:45

## 2023-03-22 RX ADMIN — HYDRALAZINE HYDROCHLORIDE 20 MG: 20 INJECTION INTRAMUSCULAR; INTRAVENOUS at 00:21

## 2023-03-22 RX ADMIN — IOPAMIDOL 100 ML: 755 INJECTION, SOLUTION INTRAVENOUS at 01:22

## 2023-03-22 RX ADMIN — AMOXICILLIN AND CLAVULANATE POTASSIUM 1 TABLET: 875; 125 TABLET, FILM COATED ORAL at 03:45

## 2023-03-22 RX ADMIN — SODIUM CHLORIDE 1000 ML: 9 INJECTION, SOLUTION INTRAVENOUS at 00:13

## 2023-03-22 RX ADMIN — ONDANSETRON 4 MG: 2 INJECTION INTRAMUSCULAR; INTRAVENOUS at 00:14

## 2023-03-22 RX ADMIN — KETOROLAC TROMETHAMINE 30 MG: 30 INJECTION, SOLUTION INTRAMUSCULAR; INTRAVENOUS at 00:13

## 2023-03-22 NOTE — ED PROVIDER NOTES
Time: 9:47 PM EDT  Date of encounter:  3/21/2023  Independent Historian/Clinical History and Information was obtained by:   Patient  Chief Complaint   Patient presents with   • Abdominal Pain   • Nausea     Pt reports low abd pain x 3 days, worse today. Pain making her nauseated.       History is limited by: N/A    History of Present Illness:  Patient is a 18 y.o. year old female who presents to the emergency department for evaluation of abdominal pain x3 days.  Patient states her pain started in llq and now is more central.  Patient states that she has had multiple episodes of vomiting today.  Patient is a diabetic and states her blood sugar has dropped to 40 multiple times today.  Patient denies diarrhea, dysuria. No fever  (Provider in triage, Chandrakant Jorge PA-C).    History provided by:  Patient      Patient Care Team  Primary Care Provider: Jasmin Vickers APRN    Past Medical History:     No Known Allergies  Past Medical History:   Diagnosis Date   • Anxiety    • COVID 08/07/2022    NO CURRENT SYMPTOMS   • Diabetes mellitus (HCC)    • Foot pain, left    • Hypertension    • Migraines      Past Surgical History:   Procedure Laterality Date   • FOOT SURGERY      DISTAL LEFT FIRST METATARSAL   • HARDWARE REMOVAL Left 8/26/2022    Procedure: HARDWARE REMOVAL;  left first metatarsal shaft;  Surgeon: Michel Jaffe DPM;  Location: [facility];  Service: Podiatry;  Laterality: Left;   • ULNAR OSTEOPLASTY  2018     Family History   Adopted: Yes       Home Medications:  Prior to Admission medications    Medication Sig Start Date End Date Taking? Authorizing Provider   aspirin-acetaminophen-caffeine (EXCEDRIN MIGRAINE) 250-250-65 MG per tablet Take  by mouth.    Provider, MD Edison   Continuous Blood Gluc  (Dexcom G6 ) device 1 each Take As Directed. 1/20/23   Jasmin Vickers APRN   Continuous Blood Gluc Sensor (Dexcom G6 Sensor) Every 10 (Ten) Days. 1/20/23   Rancho  JOSE Castellanos   Continuous Blood Gluc Transmit (Dexcom G6 Transmitter) misc 1 each Take As Directed. 1/20/23   Jasmin Vickers APRN   dicyclomine (BENTYL) 20 MG tablet Take 1 tablet by mouth Every 6 (Six) Hours. 12/1/22   Suzi Nicolas APRN   empagliflozin (Jardiance) 10 MG tablet tablet Take 1 tablet by mouth Daily. 1/20/23   Jasmin Vickers APRN   glucose blood test strip Use three times per day as needed 12/9/22   Jasmin Vickers APRN   glucose monitoring kit (FREESTYLE) monitoring kit 1 each 3 (Three) Times a Day As Needed (Blood Sugar Check). 10/18/22 10/18/23  Jasmin Vickers APRN   hydroCHLOROthiazide (HYDRODIURIL) 25 MG tablet Take 1 tablet by mouth Daily. 1/20/23   Jasmin Vickers APRN   ibuprofen (ADVIL,MOTRIN) 200 MG tablet 2 tablets by mouth prn    Edison Aaron MD   Lancets (freestyle) lancets Use three times per day as needed to check blood sugars 10/18/22   Jasmin Vickers APRN   lisinopril (PRINIVIL,ZESTRIL) 40 MG tablet Take 1 tablet by mouth Daily. 11/9/22   Jasmin Vickers APRN   Lo Loestrin Fe 1 MG-10 MCG / 10 MCG tablet Take 1 tablet by mouth Daily. 12/30/22   Edison Aaron MD   ondansetron ODT (ZOFRAN-ODT) 4 MG disintegrating tablet Place 1 tablet on the tongue Every 8 (Eight) Hours As Needed for Nausea or Vomiting. 12/24/22   Sonia Feliz MD   Rimegepant Sulfate (NURTEC) 75 MG tablet dispersible tablet Take 1 tablet by mouth Every Other Day As Needed (migraine). 10/12/22   Jasmin Vickers APRN   Semaglutide,0.25 or 0.5MG/DOS, (OZEMPIC) 2 MG/1.5ML solution pen-injector Inject 0.25 mg under the skin into the appropriate area as directed 1 (One) Time Per Week. 3/3/23   Jasmin Vickers APRN   sertraline (Zoloft) 100 MG tablet Take 1 tablet by mouth Daily. 12/9/22   Jasmin Vickers APRN   traMADol (ULTRAM) 50 MG tablet Take 1 tablet by mouth Every 8 (Eight) Hours As Needed for Moderate Pain . Take 1 tablet by mouth  "every 8 hours as needed for moderate pain. 8/26/22   Michel Jaffe DPM        Social History:   Social History     Tobacco Use   • Smoking status: Never   • Smokeless tobacco: Never   Vaping Use   • Vaping Use: Never used   Substance Use Topics   • Alcohol use: Not Currently   • Drug use: Not Currently         Review of Systems:  Review of Systems   Constitutional: Negative for chills and fever.   HENT: Negative for congestion, ear pain and sore throat.    Eyes: Negative for pain.   Respiratory: Negative for cough, chest tightness and shortness of breath.    Cardiovascular: Negative for chest pain.   Gastrointestinal: Positive for abdominal pain, nausea and vomiting. Negative for diarrhea.   Genitourinary: Negative for dysuria, flank pain, hematuria, vaginal bleeding, vaginal discharge and vaginal pain.   Musculoskeletal: Negative for back pain and joint swelling.   Skin: Negative for pallor.   Neurological: Negative for seizures and headaches.   Hematological: Negative.    Psychiatric/Behavioral: Negative.    All other systems reviewed and are negative.       Physical Exam:  /90   Pulse 100   Temp 98.3 °F (36.8 °C)   Resp 20   Ht 157.5 cm (62\")   Wt 78.5 kg (173 lb 1 oz)   SpO2 95%   BMI 31.65 kg/m²     Physical Exam  Vitals and nursing note reviewed.   Constitutional:       General: She is not in acute distress.     Appearance: Normal appearance. She is not toxic-appearing.   HENT:      Head: Normocephalic and atraumatic.      Mouth/Throat:      Mouth: Mucous membranes are moist.   Eyes:      General: No scleral icterus.     Pupils: Pupils are equal, round, and reactive to light.   Cardiovascular:      Rate and Rhythm: Regular rhythm. Tachycardia present.      Pulses: Normal pulses.      Heart sounds: Normal heart sounds.   Pulmonary:      Effort: Pulmonary effort is normal. No respiratory distress.      Breath sounds: Normal breath sounds.   Abdominal:      General: Bowel sounds are normal. " There is no distension.      Palpations: Abdomen is soft.      Tenderness: There is abdominal tenderness in the suprapubic area and left lower quadrant. There is no right CVA tenderness or left CVA tenderness.   Musculoskeletal:         General: Normal range of motion.      Cervical back: Normal range of motion and neck supple.   Skin:     General: Skin is warm and dry.   Neurological:      General: No focal deficit present.      Mental Status: She is alert and oriented to person, place, and time. Mental status is at baseline.   Psychiatric:         Mood and Affect: Mood normal.         Behavior: Behavior normal.           Procedures:  Procedures      Medical Decision Making:      Comorbidities that affect care:    Diabetes, Hypertension    External Notes reviewed:    None      The following orders were placed and all results were independently analyzed by me:  Orders Placed This Encounter   Procedures   • CT Abdomen Pelvis With Contrast   • [place] Draw   • Comprehensive Metabolic Panel   • Lipase   • Urinalysis With Microscopic If Indicated (No Culture) - Urine, Clean Catch   • hCG, Quantitative, Pregnancy   • CBC Auto Differential   • NPO Diet NPO Type: Strict NPO   • Undress & Gown   • POC Glucose STAT   • POC Glucose Once   • Insert Peripheral IV   • CBC & Differential   • Green Top (Gel)   • Lavender Top   • Gold Top - SST   • Light Blue Top       Medications Given in the Emergency Department:  Medications   sodium chloride 0.9 % flush 10 mL (has no administration in time range)   ondansetron (ZOFRAN) injection 4 mg (4 mg Intravenous Given 3/22/23 0014)   ketorolac (TORADOL) injection 30 mg (30 mg Intravenous Given 3/22/23 0013)   sodium chloride 0.9 % bolus 1,000 mL (0 mL Intravenous Stopped 3/22/23 0203)   hydrALAZINE (APRESOLINE) injection 20 mg (20 mg Intravenous Given 3/22/23 0021)   iopamidol (ISOVUE-370) 76 % injection 100 mL (100 mL Intravenous Given 3/22/23 0122)   amoxicillin-clavulanate (AUGMENTIN)  875-125 MG per tablet 1 tablet (1 tablet Oral Given 3/22/23 0345)   dicyclomine (BENTYL) capsule 20 mg (20 mg Oral Given 3/22/23 0345)        ED Course:    The patient was initially evaluated in the triage area where orders were placed. The patient was later dispositioned by JOSE Guardado.      The patient was advised to stay for completion of workup which includes but is not limited to communication of labs and radiological results, reassessment and plan. The patient was advised that leaving prior to disposition by a provider could result in critical findings that are not communicated to the patient.          Labs:    Lab Results (last 24 hours)     Procedure Component Value Units Date/Time    CBC & Differential [[id number]]  (Abnormal) Collected: 03/21/23 2130    Specimen: Blood Updated: 03/21/23 2156    Narrative:      The following orders were created for panel order CBC & Differential.  Procedure                               Abnormality         Status                     ---------                               -----------         ------                     CBC Auto Differential[[id number]]        Abnormal            Final result                 Please view results for these tests on the individual orders.    Comprehensive Metabolic Panel [[id number]]  (Abnormal) Collected: 03/21/23 2130    Specimen: Blood Updated: 03/21/23 2216     Glucose 119 mg/dL      BUN 13 mg/dL      Creatinine 0.90 mg/dL      Sodium 136 mmol/L      Potassium 4.0 mmol/L      Chloride 101 mmol/L      CO2 20.0 mmol/L      Calcium 9.4 mg/dL      Total Protein 7.6 g/dL      Albumin 4.3 g/dL      ALT (SGPT) 84 U/L      AST (SGOT) 213 U/L      Alkaline Phosphatase 105 U/L      Total Bilirubin 0.3 mg/dL      Globulin 3.3 gm/dL      A/G Ratio 1.3 g/dL      BUN/Creatinine Ratio 14.4     Anion Gap 15.0 mmol/L      eGFR 94.1 mL/min/1.73     Narrative:      GFR Normal >60  Chronic Kidney Disease <60  Kidney Failure <15      Lipase [[id number]]   (Normal) Collected: 03/21/23 2130    Specimen: Blood Updated: 03/21/23 2216     Lipase 18 U/L     hCG, Quantitative, Pregnancy [[id number]] Collected: 03/21/23 2130    Specimen: Blood Updated: 03/21/23 2214     HCG Quantitative <0.50 mIU/mL     Narrative:      HCG Ranges by Gestational Age    Females - non-pregnant premenopausal   </= 1mIU/mL HCG  Females - postmenopausal               </= 7mIU/mL HCG    3 Weeks       5.4   -      72 mIU/mL  4 Weeks      10.2   -     708 mIU/mL  5 Weeks       217   -   8,245 mIU/mL  6 Weeks       152   -  32,177 mIU/mL  7 Weeks     4,059   - 153,767 mIU/mL  8 Weeks    31,366   - 149,094 mIU/mL  9 Weeks    59,109   - 135,901 mIU/mL  10 Weeks   44,186   - 170,409 mIU/mL  12 Weeks   27,107   - 201,615 mIU/mL  14 Weeks   24,302   -  93,646 mIU/mL  15 Weeks   12,540   -  69,747 mIU/mL  16 Weeks    8,904   -  55,332 mIU/mL  17 Weeks    8,240   -  51,793 mIU/mL  18 Weeks    9,649   -  55,271 mIU/mL      CBC Auto Differential [[id number]]  (Abnormal) Collected: 03/21/23 2130    Specimen: Blood Updated: 03/21/23 2156     WBC 10.65 10*3/mm3      RBC 5.58 10*6/mm3      Hemoglobin 15.9 g/dL      Hematocrit 44.4 %      MCV 79.6 fL      MCH 28.5 pg      MCHC 35.8 g/dL      RDW 11.6 %      RDW-SD 33.4 fl      MPV 9.3 fL      Platelets 307 10*3/mm3      Neutrophil % 76.3 %      Lymphocyte % 15.5 %      Monocyte % 6.9 %      Eosinophil % 0.7 %      Basophil % 0.3 %      Immature Grans % 0.3 %      Neutrophils, Absolute 8.14 10*3/mm3      Lymphocytes, Absolute 1.65 10*3/mm3      Monocytes, Absolute 0.73 10*3/mm3      Eosinophils, Absolute 0.07 10*3/mm3      Basophils, Absolute 0.03 10*3/mm3      Immature Grans, Absolute 0.03 10*3/mm3      nRBC 0.0 /100 WBC     Urinalysis With Microscopic If Indicated (No Culture) - [[id number]]  (Abnormal) Collected: 03/21/23 2137    Specimen: Urine Updated: 03/21/23 2209     Color, UA Yellow     Appearance, UA Clear     pH, UA 5.5     Specific Gravity, UA >1.030      Glucose, UA >=1000 mg/dL (3+)     Ketones, UA Trace     Bilirubin, UA Negative     Blood, UA Negative     Protein, UA Trace     Leuk Esterase, UA Negative     Nitrite, UA Negative     Urobilinogen, UA 0.2 E.U./dL    Narrative:      Urine microscopic not indicated.    POC Glucose Once [[id number]]  (Abnormal) Collected: 03/21/23 2204    Specimen: Blood Updated: 03/21/23 2207     Glucose 128 mg/dL      Comment: Serial Number: [id number]:  [id number]              Imaging:    CT Abdomen Pelvis With Contrast    Result Date: 3/22/2023  PROCEDURE: CT ABDOMEN PELVIS W CONTRAST  COMPARISON: 12/1/2022.  INDICATIONS: PERIUMBILICAL ABDOMINAL PAIN X 1 DAY.  TECHNIQUE: After obtaining the patient's consent, 645 CT images were created with non-ionic intravenous contrast material.  No oral contrast agent was administered for the study.  PROTOCOL:   Standard CT imaging protocol performed.    RADIATION:   Total DLP: 483.6 mGy*cm.   Automated exposure control was utilized to minimize radiation dose. CONTRAST: 100 mL Isovue 370 I.V.  FINDINGS: There is new age-indeterminate but possibly acute to subacute inflammatory change involving the mid to distal sigmoid colon within the right pelvis, near (probably just anterior to) the right adnexa.  The findings may represent acute to subacute epiploic appendagitis.  Acute to subacute colonic (sigmoid) diverticulitis cannot be excluded entirely.  There are scattered colonic diverticula.  Again, these findings are new since the prior CT study.  There is no acute appendicitis.  The appendix is seen on image 69 of series 201 and adjacent images. No acute cholecystitis or pancreatitis.  No gallstones.  No biliary ductal dilatation.  No mechanical bowel obstruction.  No definite bowel wall thickening.  No pneumoperitoneum or pneumatosis.  No portal or mesenteric venous gas.  No acute findings are seen involving the remainder of the colon.  No renal/ureteral stones or hydronephrosis is seen.  No  obstructive uropathy is identified.  No acute pyelonephritis.  A trace amount of nonspecific free fluid is seen in the cul-de-sac.  It has a CT number of about 18 Hounsfield units or less.  No suspicious uterine or adnexal mass.  No urinary bladder calculus is suggested.  No aneurysmal dilatation of the aortoiliac arterial system.  No acute intraperitoneal or retroperitoneal hemorrhage.  No enlarged intra-abdominal or intrapelvic lymph nodes.  No adrenal mass.  No acute findings are seen with regard to the liver or spleen.  There is diffuse hepatic steatosis with hepatomegaly.  Maximum craniocaudal dimension of the right lobe of the liver is 20.1 cm.  No splenomegaly is seen.  No acute fracture.  No aggressive osseous lesion.  No acute infiltrate is seen in the partially imaged lung bases.  No urinary bladder wall thickening or urinary bladder calculi are appreciated.         1. There is new age-indeterminate inflammatory change within the right pelvis, probably centered about the wxe-oi-ehstie sigmoid colon.  It may represent acute-to-subacute mild-to-moderate sigmoid diverticulitis.  Acute epiploic appendagitis is possible.  There is no pneumoperitoneum or pneumatosis.  No mechanical bowel obstruction is seen.  No portal or mesenteric venous gas is seen to suggest ischemic colitis.  No pericolonic fluid collection is seen to suggest abscess.  Consider close interval clinical and imaging follow-up of the finding to ensure a benign progression and to exclude an underlying malignant process, which is thought to be unlikely, considering the patient's age.  2. A minimal amount of nonspecific free fluid is seen in the cul-de-sac with a CT number less than 18 Hounsfield units.  It probably represents normal free physiologic fluid.   3. No acute appendicitis.   4. There is diffuse hepatic steatosis without hepatomegaly.   5. No other definite acute findings are seen in the abdomen or pelvis by enhanced CT examination.     Please note that portions of this note were completed with a voice recognition program.  MALINA BARROSO JR, MD       Electronically Signed and Approved By: MALINA BARROSO JR, MD on 3/22/2023 at 3:01                  Differential Diagnosis and Discussion:      Abdominal Pain: Based on the patient's signs and symptoms, I considered abdominal aortic aneurysm, small bowel obstruction, pancreatitis, acute cholecystitis, acute appendecitis, peptic ulcer disease, gastritis, colitis, endocrine disorders, irritable bowel syndrome and other differential diagnosis an etiology of the patient's abdominal pain.    All labs were reviewed and interpreted by me.  CT scan radiology interpretation was reviewed by me.    MDM  Number of Diagnoses or Management Options  Diverticulitis  Hypertension, unspecified type  Lower abdominal pain  Nausea and vomiting, unspecified vomiting type  Diagnosis management comments: The patient is resting comfortably and feels better, is alert and in no distress. Repeat examination is unremarkable and benign; in particular, there's no discomfort at McBurney's point and there is no pulsatile mass. The history, exam, diagnostic testing, and current condition does not suggest acute appendicitis, bowel obstruction, acute cholecystitis, bowel perforation, major gastrointestinal bleeding, severe diverticulitis, abdominal aortic aneurysm, mesenteric ischemia, volvulus, sepsis, or other significant pathology that warrants further testing, continued ED treatment, admission, for surgical evaluation at this point. The vital signs have been stable. The patient does not have uncontrollable pain, intractable vomiting, or other significant symptoms. The patient's condition is stable and appropriate for discharge from the emergency department.         Amount and/or Complexity of Data Reviewed  Clinical lab tests: reviewed and ordered  Tests in the radiology section of CPT®: reviewed and ordered  Tests in the medicine  section of CPT®: ordered and reviewed  Decide to obtain previous medical records or to obtain history from someone other than the patient: yes    Risk of Complications, Morbidity, and/or Mortality  Presenting problems: moderate  Diagnostic procedures: moderate  Management options: low    Patient Progress  Patient progress: stable         Patient Care Considerations:    CONSULT: I considered consulting Gastroenterology or hospitalist, however Patient is tolerating p.o. pain is minimal and reticulitis is mild.  Patient is tolerating p.o.      Consultants/Shared Management Plan:    None    Social Determinants of Health:    Patient is independent, reliable, and has access to care.       Disposition and Care Coordination:    Discharged: The patient is suitable and stable for discharge with no need for consideration of observation or admission.    I have explained the patient´s condition, diagnoses and treatment plan based on the information available to me at this time. I have answered questions and addressed any concerns. The patient has a good  understanding of the patient´s diagnosis, condition, and treatment plan as can be expected at this point. The vital signs have been stable. The patient´s condition is stable and appropriate for discharge from the emergency department.      The patient will pursue further outpatient evaluation with the primary care physician or other designated or consulting physician as outlined in the discharge instructions. They are agreeable to this plan of care and follow-up instructions have been explained in detail. The patient has received these instructions in written format and have expressed an understanding of the discharge instructions. The patient is aware that any significant change in condition or worsening of symptoms should prompt an immediate return to this or the closest emergency department or call to 911.  I have explained discharge medications and the need for follow up  with the patient/caretakers. This was also printed in the discharge instructions. Patient was discharged with the following medications and follow up:      Medication List      New Prescriptions    amoxicillin-clavulanate 875-125 MG per tablet  Commonly known as: AUGMENTIN  Take 1 tablet by mouth 2 (Two) Times a Day for 10 days.        Changed    ondansetron ODT 4 MG disintegrating tablet  Commonly known as: ZOFRAN-ODT  Place 1 tablet on the tongue Every 4 (Four) Hours As Needed for Nausea or Vomiting.  What changed: when to take this        Stop    Rimegepant Sulfate 75 MG tablet dispersible tablet  Commonly known as: NURTEC     Semaglutide(0.25 or 0.5MG/DOS) 2 MG/1.5ML solution pen-injector  Commonly known as: OZEMPIC           Where to Get Your Medications      These medications were sent to [id number] - [place], [place] - [address] AT [facility] - [phone]  - [phone]   [address] [place] [address]    Phone: [phone]   · amoxicillin-clavulanate 875-125 MG per tablet  · dicyclomine 20 MG tablet  · ondansetron ODT 4 MG disintegrating tablet      Jasmin Vickers, JOSE  [address]  [address]  [place] [address]  [phone]      As needed    Papi Bey MD  [address] [place] [address]  [phone]    Schedule an appointment as soon as possible for a visit   Evaluation of diverticulitis       Final diagnoses:   Hypertension, unspecified type   Nausea and vomiting, unspecified vomiting type   Lower abdominal pain   Diverticulitis        ED Disposition     ED Disposition   Discharge    Condition   Stable    Comment   --             This medical record created using voice recognition software.           Suzi Nicolas, JOSE  03/22/23 4585

## 2023-03-22 NOTE — DISCHARGE INSTRUCTIONS
Your CT findings indicate possible diverticulitis without any complication.  This can be treated with antibiotic.  Have been given the first dose in the emergency department    Take take all medications as prescribed.  Be sure to take your home blood pressure medicine as well as other meds as prescribed    Follow-up with your PCP and with gastroenterologist as directed.    Return for new or worsening symptoms

## 2023-04-10 ENCOUNTER — TELEPHONE (OUTPATIENT)
Dept: INTERNAL MEDICINE | Facility: CLINIC | Age: 18
End: 2023-04-10
Payer: COMMERCIAL

## 2023-04-10 NOTE — TELEPHONE ENCOUNTER
PATIENT NO SHOWED TO APPT ON 4/7/23. LVM FOR PATIENT TO CALL BACK TO RESCHEDULE APPT.  NO SHOW LETTER SENT.    NO SHOW-X1

## 2023-05-31 DIAGNOSIS — E11.65 TYPE 2 DIABETES MELLITUS WITH HYPERGLYCEMIA, WITHOUT LONG-TERM CURRENT USE OF INSULIN: Chronic | ICD-10-CM

## 2023-05-31 RX ORDER — EMPAGLIFLOZIN 10 MG/1
TABLET, FILM COATED ORAL
Qty: 30 TABLET | Refills: 0 | Status: SHIPPED | OUTPATIENT
Start: 2023-05-31

## 2023-06-07 DIAGNOSIS — F41.9 ANXIETY: Chronic | ICD-10-CM

## 2023-06-07 DIAGNOSIS — F33.0 MAJOR DEPRESSIVE DISORDER, RECURRENT EPISODE, MILD DEGREE: Chronic | ICD-10-CM

## 2023-06-07 RX ORDER — SERTRALINE HYDROCHLORIDE 100 MG/1
100 TABLET, FILM COATED ORAL DAILY
Qty: 90 TABLET | Refills: 1 | Status: SHIPPED | OUTPATIENT
Start: 2023-06-07

## 2023-06-15 ENCOUNTER — TELEPHONE (OUTPATIENT)
Dept: INTERNAL MEDICINE | Facility: CLINIC | Age: 18
End: 2023-06-15
Payer: COMMERCIAL

## 2023-06-15 NOTE — TELEPHONE ENCOUNTER
Spoke with patient. Verified .     Patient has not been taking the Ozempic and Metformin.   Patient has only been taking the Jardiance.     Patient is scheduled for diabetic follow-up in July.     Patient is needing to get scheduled with Behavioral Health. Wanting a female provider only.     Will call them tomorrow and get her an appointment.

## 2023-06-16 NOTE — TELEPHONE ENCOUNTER
Spoke with [facility] MA - made them aware patient is needing an appointment and continues to be scheduled with a male and only wants to see a female. Was transferred to Pat in the office who does the referrals/new patient appointments.    Left message for Pat to call me back since my call eventually went to her voicemail.

## 2023-06-16 NOTE — TELEPHONE ENCOUNTER
Spoke with patient and informed of appointment time and place. Patient requested this be sent through [employer] message. No further questions or concerns noted.

## 2023-06-16 NOTE — TELEPHONE ENCOUNTER
Spoke with Pat at [facility]     Patient is scheduled for 07/24/2023 at 2:40 pm with Catie Murillo.     [facility] location:  [address] [place] [address] [address]       Will call patient and let her know

## 2023-07-19 PROBLEM — Z91.199 NONCOMPLIANCE: Status: ACTIVE | Noted: 2023-07-19

## 2023-07-20 ENCOUNTER — TELEPHONE (OUTPATIENT)
Dept: INTERNAL MEDICINE | Facility: CLINIC | Age: 18
End: 2023-07-20

## 2023-07-20 NOTE — TELEPHONE ENCOUNTER
Caller: Amarilis Vinson    Relationship: Self    Best call back number: [phone]     Caller requesting test results: YES    What test was performed: BLOOD WORK    When was the test performed: 7/19/23    Where was the test performed: IN THE OFFICE    Additional notes: PATIENT STATED THAT SHE HAS SEEN RESULTS IN MYCHART AND NEEDING TO DISCUSS.   CALLING PRIOR TO 2 PM TODAY IS PREFERRED.

## 2023-07-23 DIAGNOSIS — I10 HYPERTENSION, UNSPECIFIED TYPE: ICD-10-CM

## 2023-07-24 ENCOUNTER — OFFICE VISIT (OUTPATIENT)
Dept: BEHAVIORAL HEALTH | Facility: CLINIC | Age: 18
End: 2023-07-24
Payer: COMMERCIAL

## 2023-07-24 VITALS
WEIGHT: 165 LBS | HEART RATE: 86 BPM | DIASTOLIC BLOOD PRESSURE: 122 MMHG | SYSTOLIC BLOOD PRESSURE: 166 MMHG | HEIGHT: 62 IN | BODY MASS INDEX: 30.36 KG/M2

## 2023-07-24 DIAGNOSIS — F41.0 PANIC DISORDER: ICD-10-CM

## 2023-07-24 DIAGNOSIS — G47.00 INSOMNIA, UNSPECIFIED TYPE: ICD-10-CM

## 2023-07-24 DIAGNOSIS — F33.3 SEVERE EPISODE OF RECURRENT MAJOR DEPRESSIVE DISORDER, WITH PSYCHOTIC FEATURES: ICD-10-CM

## 2023-07-24 DIAGNOSIS — F41.1 GENERALIZED ANXIETY DISORDER: Primary | ICD-10-CM

## 2023-07-24 DIAGNOSIS — F43.10 POST TRAUMATIC STRESS DISORDER (PTSD): ICD-10-CM

## 2023-07-24 PROBLEM — Z48.89 AFTERCARE FOLLOWING SURGERY: Status: ACTIVE | Noted: 2021-05-17

## 2023-07-24 PROBLEM — E88.81 INSULIN RESISTANCE: Status: ACTIVE | Noted: 2018-01-24

## 2023-07-24 PROBLEM — Z78.9 ADOPTED: Status: ACTIVE | Noted: 2017-05-17

## 2023-07-24 PROBLEM — E88.819 INSULIN RESISTANCE: Status: ACTIVE | Noted: 2018-01-24

## 2023-07-24 PROBLEM — Z02.82 ADOPTED: Status: ACTIVE | Noted: 2017-05-17

## 2023-07-24 PROBLEM — R73.09 ELEVATED HEMOGLOBIN A1C: Status: ACTIVE | Noted: 2017-05-17

## 2023-07-24 PROBLEM — M21.619 BUNION: Status: ACTIVE | Noted: 2023-07-24

## 2023-07-24 PROBLEM — S63.509A SPRAIN OF WRIST: Status: ACTIVE | Noted: 2017-09-14

## 2023-07-24 PROBLEM — E05.00 GRAVES DISEASE: Status: ACTIVE | Noted: 2019-12-02

## 2023-07-24 PROBLEM — M25.539 PAIN IN WRIST: Status: ACTIVE | Noted: 2017-09-28

## 2023-07-24 PROCEDURE — 90792 PSYCH DIAG EVAL W/MED SRVCS: CPT | Performed by: PHYSICIAN ASSISTANT

## 2023-07-24 RX ORDER — HYDROCHLOROTHIAZIDE 25 MG/1
25 TABLET ORAL DAILY
Qty: 90 TABLET | Refills: 1 | OUTPATIENT
Start: 2023-07-24

## 2023-07-24 RX ORDER — ESCITALOPRAM OXALATE 10 MG/1
TABLET ORAL
Qty: 30 TABLET | Refills: 1 | Status: SHIPPED | OUTPATIENT
Start: 2023-07-24

## 2023-07-24 NOTE — PROGRESS NOTES
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 resulted.    Extra Tube 12/24/2022 Hold for add-ons.   Final    Auto resulted    Glucose 12/24/2022 140 (H)  70 - 99 mg/dL Final    Serial Number: [id number]:  [id number]    Acetone 12/24/2022 Negative  Negative Final    Occult Blood, Fecal by Immunoassay 12/24/2022 Negative  Negative Final   Office Visit on 12/09/2022   Component Date Value Ref Range Status    Microalbumin, Urine 12/09/2022 2.4  mg/dL Final    Chlamydia trachomatis, REGGIE 12/09/2022 Negative  Negative Final    Gonococcus by REGGIE 12/09/2022 Negative  Negative Final    Trichomonas vaginosis 12/09/2022 Negative  Negative Final   Admission on 12/01/2022, Discharged on 12/01/2022   Component Date Value Ref Range Status    Glucose 11/30/2022 152 (H)  65 - 99 mg/dL Final    BUN 11/30/2022 17  6 - 20 mg/dL Final    Creatinine 11/30/2022 0.95  0.57 - 1.00 mg/dL Final    Sodium 11/30/2022 137  136 - 145 mmol/L Final    Potassium 11/30/2022 4.3  3.5 - 5.2 mmol/L Final    Slight hemolysis detected by analyzer. Results may be affected.    Chloride 11/30/2022 101  98 - 107 mmol/L Final    CO2 11/30/2022 20.8 (L)  22.0 - 29.0 mmol/L Final    Calcium 11/30/2022 9.8  8.6 - 10.5 mg/dL Final    Total Protein 11/30/2022 7.5  6.0 - 8.5 g/dL Final    Albumin 11/30/2022 4.30  3.50 - 5.20 g/dL Final    ALT (SGPT) 11/30/2022 31  1 - 33 U/L  Final    AST (SGOT) 11/30/2022 35 (H)  1 - 32 U/L Final    Alkaline Phosphatase 11/30/2022 123 (H)  39 - 117 U/L Final    Total Bilirubin 11/30/2022 <0.2  0.0 - 1.2 mg/dL Final    Globulin 11/30/2022 3.2  gm/dL Final    A/G Ratio 11/30/2022 1.3  g/dL Final    BUN/Creatinine Ratio 11/30/2022 17.9  7.0 - 25.0 Final    Anion Gap 11/30/2022 15.2 (H)  5.0 - 15.0 mmol/L Final    eGFR 11/30/2022 88.7  >60.0 mL/min/1.73 Final    National Kidney Foundation and American Society of Nephrology (ASN) Task Force recommended calculation based on the Chronic Kidney Disease Epidemiology Collaboration (CKD-EPI) equation refit without adjustment for race.    Lipase 11/30/2022 47  13 - 60 U/L Final    Color, UA 11/30/2022 Yellow  Yellow, Straw Final    Appearance, UA 11/30/2022 Clear  Clear Final    pH, UA 11/30/2022 7.0  5.0 - 8.0 Final    Specific Gravity, UA 11/30/2022 >1.030 (H)  1.005 - 1.030 Final    Glucose, UA 11/30/2022 500 mg/dL (2+) (A)  Negative Final    Ketones, UA 11/30/2022 Trace (A)  Negative Final    Bilirubin, UA 11/30/2022 Negative  Negative Final    Blood, UA 11/30/2022 Negative  Negative Final    Protein, UA 11/30/2022 Trace (A)  Negative Final    Leuk Esterase, UA 11/30/2022 Negative  Negative Final    Nitrite, UA 11/30/2022 Negative  Negative Final    Urobilinogen, UA 11/30/2022 1.0 E.U./dL  0.2 - 1.0 E.U./dL Final    HCG Quantitative 11/30/2022 <0.50  mIU/mL Final    Extra Tube 11/30/2022 Hold for add-ons.   Final    Auto resulted.    Extra Tube 11/30/2022 hold for add-on   Final    Auto resulted    Extra Tube 11/30/2022 Hold for add-ons.   Final    Auto resulted.    Extra Tube 11/30/2022 Hold for add-ons.   Final    Auto resulted    WBC 11/30/2022 13.94 (H)  3.40 - 10.80 10*3/mm3 Final    RBC 11/30/2022 5.07  3.77 - 5.28 10*6/mm3 Final    Hemoglobin 11/30/2022 14.5  12.0 - 15.9 g/dL Final    Hematocrit 11/30/2022 42.6  34.0 - 46.6 % Final    MCV 11/30/2022 84.0  79.0 - 97.0 fL Final    MCH 11/30/2022 28.6  26.6  - 33.0 pg Final    MCHC 11/30/2022 34.0  31.5 - 35.7 g/dL Final    RDW 11/30/2022 13.1  12.3 - 15.4 % Final    RDW-SD 11/30/2022 39.9  37.0 - 54.0 fl Final    MPV 11/30/2022 9.1  6.0 - 12.0 fL Final    Platelets 11/30/2022 408  140 - 450 10*3/mm3 Final    Neutrophil % 11/30/2022 49.9  42.7 - 76.0 % Final    Lymphocyte % 11/30/2022 40.7  19.6 - 45.3 % Final    Monocyte % 11/30/2022 7.0  5.0 - 12.0 % Final    Eosinophil % 11/30/2022 1.4  0.3 - 6.2 % Final    Basophil % 11/30/2022 0.6  0.0 - 1.5 % Final    Immature Grans % 11/30/2022 0.4  0.0 - 0.5 % Final    Neutrophils, Absolute 11/30/2022 6.95  1.70 - 7.00 10*3/mm3 Final    Lymphocytes, Absolute 11/30/2022 5.67 (H)  0.70 - 3.10 10*3/mm3 Final    Monocytes, Absolute 11/30/2022 0.98 (H)  0.10 - 0.90 10*3/mm3 Final    Eosinophils, Absolute 11/30/2022 0.20  0.00 - 0.40 10*3/mm3 Final    Basophils, Absolute 11/30/2022 0.09  0.00 - 0.20 10*3/mm3 Final    Immature Grans, Absolute 11/30/2022 0.05  0.00 - 0.05 10*3/mm3 Final    nRBC 11/30/2022 0.0  0.0 - 0.2 /100 WBC Final    Acetone 12/01/2022 Negative  Negative Final    QT Interval 12/01/2022 370  ms Final   Office Visit on 10/12/2022   Component Date Value Ref Range Status    Hepatitis C Ab 10/12/2022 <0.1  0.0 - 0.9 s/co ratio Final    TSH 10/12/2022 1.830  0.270 - 4.200 uIU/mL Final    Glucose 10/12/2022 391 (H)  65 - 99 mg/dL Final    BUN 10/12/2022 12  6 - 20 mg/dL Final    Creatinine 10/12/2022 0.80  0.57 - 1.00 mg/dL Final    Sodium 10/12/2022 134 (L)  136 - 145 mmol/L Final    Potassium 10/12/2022 4.8  3.5 - 5.2 mmol/L Final    Chloride 10/12/2022 98  98 - 107 mmol/L Final    CO2 10/12/2022 21.4 (L)  22.0 - 29.0 mmol/L Final    Calcium 10/12/2022 10.2  8.6 - 10.5 mg/dL Final    Total Protein 10/12/2022 7.3  6.0 - 8.5 g/dL Final    Albumin 10/12/2022 4.60  3.50 - 5.20 g/dL Final    ALT (SGPT) 10/12/2022 54 (H)  1 - 33 U/L Final    AST (SGOT) 10/12/2022 56 (H)  1 - 32 U/L Final    Alkaline Phosphatase 10/12/2022 185  (H)  39 - 117 U/L Final    Total Bilirubin 10/12/2022 0.2  0.0 - 1.2 mg/dL Final    Globulin 10/12/2022 2.7  gm/dL Final    A/G Ratio 10/12/2022 1.7  g/dL Final    BUN/Creatinine Ratio 10/12/2022 15.0  7.0 - 25.0 Final    Anion Gap 10/12/2022 14.6  5.0 - 15.0 mmol/L Final    eGFR 10/12/2022 109.0  >60.0 mL/min/1.73 Final    National Kidney Foundation and American Society of Nephrology (ASN) Task Force recommended calculation based on the Chronic Kidney Disease Epidemiology Collaboration (CKD-EPI) equation refit without adjustment for race.    Total Cholesterol 10/12/2022 187  0 - 200 mg/dL Final    Triglycerides 10/12/2022 394 (H)  0 - 150 mg/dL Final    HDL Cholesterol 10/12/2022 24 (L)  40 - 60 mg/dL Final    LDL Cholesterol  10/12/2022 97  0 - 100 mg/dL Final    VLDL Cholesterol 10/12/2022 66 (H)  5 - 40 mg/dL Final    LDL/HDL Ratio 10/12/2022 3.51   Final    HCG, Urine, QL 10/12/2022 Negative  Negative Final    Lot Number 10/12/2022 [id number]   Final    Internal Positive Control 10/12/2022 Passed  Positive, Passed Final    Internal Negative Control 10/12/2022 Passed  Negative, Passed Final    Expiration Date 10/12/2022 10/31/2023   Final    WBC 10/12/2022 7.96  3.40 - 10.80 10*3/mm3 Final    RBC 10/12/2022 5.44 (H)  3.77 - 5.28 10*6/mm3 Final    Hemoglobin 10/12/2022 15.1  12.0 - 15.9 g/dL Final    Hematocrit 10/12/2022 46.5  34.0 - 46.6 % Final    MCV 10/12/2022 85.5  79.0 - 97.0 fL Final    MCH 10/12/2022 27.8  26.6 - 33.0 pg Final    MCHC 10/12/2022 32.5  31.5 - 35.7 g/dL Final    RDW 10/12/2022 13.6  12.3 - 15.4 % Final    RDW-SD 10/12/2022 41.4  37.0 - 54.0 fl Final    MPV 10/12/2022 10.2  6.0 - 12.0 fL Final    Platelets 10/12/2022 304  140 - 450 10*3/mm3 Final    Neutrophil % 10/12/2022 54.9  42.7 - 76.0 % Final    Lymphocyte % 10/12/2022 36.1  19.6 - 45.3 % Final    Monocyte % 10/12/2022 5.9  5.0 - 12.0 % Final    Eosinophil % 10/12/2022 1.8  0.3 - 6.2 % Final    Basophil % 10/12/2022 0.8  0.0 - 1.5 %  Final    Immature Grans % 10/12/2022 0.5  0.0 - 0.5 % Final    Neutrophils, Absolute 10/12/2022 4.38  1.70 - 7.00 10*3/mm3 Final    Lymphocytes, Absolute 10/12/2022 2.87  0.70 - 3.10 10*3/mm3 Final    Monocytes, Absolute 10/12/2022 0.47  0.10 - 0.90 10*3/mm3 Final    Eosinophils, Absolute 10/12/2022 0.14  0.00 - 0.40 10*3/mm3 Final    Basophils, Absolute 10/12/2022 0.06  0.00 - 0.20 10*3/mm3 Final    Immature Grans, Absolute 10/12/2022 0.04  0.00 - 0.05 10*3/mm3 Final    nRBC 10/12/2022 0.0  0.0 - 0.2 /100 WBC Final    Hemoglobin A1C 10/12/2022 10.00 (H)  4.80 - 5.60 % Final    Interpretation 10/12/2022 Comment   Final    Negative  Not infected with HCV, unless recent infection is suspected or other  evidence exists to indicate HCV infection.   Admission on 08/26/2022, Discharged on 08/26/2022   Component Date Value Ref Range Status    HCG, Urine QL 08/26/2022 Negative  Negative Final       EKG Results:  No orders to display       Imaging Results:  No Images in the past 120 days found..      Assessment & Plan   Diagnoses and all orders for this visit:    1. Generalized anxiety disorder (Primary)  -     escitalopram (Lexapro) 10 MG tablet; Take 0.5 tab PO QHS x 1 week, if tolerated can increase to 1 tab PO QHS  Dispense: 30 tablet; Refill: 1    2. Severe episode of recurrent major depressive disorder, with psychotic features  -     escitalopram (Lexapro) 10 MG tablet; Take 0.5 tab PO QHS x 1 week, if tolerated can increase to 1 tab PO QHS  Dispense: 30 tablet; Refill: 1    3. Post traumatic stress disorder (PTSD)  -     escitalopram (Lexapro) 10 MG tablet; Take 0.5 tab PO QHS x 1 week, if tolerated can increase to 1 tab PO QHS  Dispense: 30 tablet; Refill: 1    4. Panic disorder  -     escitalopram (Lexapro) 10 MG tablet; Take 0.5 tab PO QHS x 1 week, if tolerated can increase to 1 tab PO QHS  Dispense: 30 tablet; Refill: 1    5. Insomnia, unspecified type      Patient screened positive for depression based on a  PHQ-9 score of 18 on 7/24/2023. Follow-up recommendations include: Prescribed antidepressant medication treatment, Suicide Risk Assessment performed, and see assessment below .    Presentation seems most consistent with FLAQUITA, MDD, PTSD, panic disorder, insomnia.  Extensively counseled the patient on the need to be compliant with prescribed medications.  We will start patient on Lexapro for management depression, anxiety, and overall mood.  Consider augmenting with mood stabilizer/antipsychotic if appropriate by next visit.  We will be starting 1 medication at a time as patient has been noncompliant in the past.  Patient claims referral for therapy.  She declines medication for sleep at this time.  Follow-up in 1 month.  Addressed all questions and concerns.    Visit Diagnoses:    ICD-10-CM ICD-9-CM   1. Generalized anxiety disorder  F41.1 300.02   2. Severe episode of recurrent major depressive disorder, with psychotic features  F33.3 296.34   3. Post traumatic stress disorder (PTSD)  F43.10 309.81   4. Panic disorder  F41.0 300.01   5. Insomnia, unspecified type  G47.00 780.52       PLAN:  Safety: No acute safety concerns at this time.  Therapy: Patient declines referral for therapy.  Risk Assessment: Risk of self-harm acutely is moderate to severe.  Risk factors include anxiety disorder, mood disorder, family history, h/o self harm in the past, and recent psychosocial stressors (pandemic). Protective factors include denies access to guns/weapons, no present SI, no history of suicide attempts in the past, minimal AODA, healthcare seeking, future orientation, willingness to engage in care.  Risk of self-harm chronically is also moderate to severe, but could be further elevated in the event of treatment noncompliance and/or AODA.  Labs/Diagnostics Ordered:   No orders of the defined types were placed in this encounter.    Medications:   New Medications Ordered This Visit   Medications    escitalopram (Lexapro) 10 MG  tablet     Sig: Take 0.5 tab PO QHS x 1 week, if tolerated can increase to 1 tab PO QHS     Dispense:  30 tablet     Refill:  1       Discussed all risks, benefits, alternatives, and side effects of Escitalopram including but not limited to GI upset, sexual dysfunction, bleeding risk, seizure risk, insomnia, sedation, headache, activation of ron or hypomania, increased fragility fracture risk, hyponatremia, ocular effects, dose-dependent prolonged QT interval, withdrawal syndrome following abrupt discontinuation, serotonin syndrome, and activation of suicidal ideation and behavior.  Pt educated on the need to practice safe sex while taking this med. Discussed the need for pt to immediately call the office for any new or worsening symptoms, such as worsening depression; feeling nervous or restless; suicidal thoughts or actions; or other changes changes in mood or behavior, and all other concerns. Pt educated on med compliance and the risks of suddenly stopping this medication or missing doses. Pt verbalized understanding and is agreeable to taking Escitalopram. Addressed all questions and concerns.     Follow up: F/u in 1 month.      TREATMENT PLAN/GOALS: Continue supportive psychotherapy efforts and medications as indicated. Treatment and medication options discussed during today's visit. Patient ackowledged and verbally consented to continue with current treatment plan and was educated on the importance of compliance with treatment and follow-up appointments.    MEDICATION ISSUES:  ROSE reviewed as expected.  Discussed medication options and treatment plan of prescribed medication as well as the risks, benefits, and side effects including potential falls, possible impaired driving and metabolic adversities among others. Patient is agreeable to call the office with any worsening of symptoms or onset of side effects. Patient is agreeable to call 911 or go to the nearest ER should he/she begin having SI/HI. No  medication side effects or related complaints today.            This document has been electronically signed by Catie Murillo PA-C  July 24, 2023 16:32 EDT      Part of this note may be an electronic transcription/translation of spoken language to printed text using the Dragon Dictation System.

## 2023-08-03 VITALS
DIASTOLIC BLOOD PRESSURE: 99 MMHG | TEMPERATURE: 98.6 F | RESPIRATION RATE: 18 BRPM | BODY MASS INDEX: 30.39 KG/M2 | HEIGHT: 62 IN | HEART RATE: 104 BPM | WEIGHT: 165.12 LBS | OXYGEN SATURATION: 98 % | SYSTOLIC BLOOD PRESSURE: 145 MMHG

## 2023-08-03 PROCEDURE — 80053 COMPREHEN METABOLIC PANEL: CPT

## 2023-08-03 PROCEDURE — 93010 ELECTROCARDIOGRAM REPORT: CPT | Performed by: INTERNAL MEDICINE

## 2023-08-03 PROCEDURE — 36415 COLL VENOUS BLD VENIPUNCTURE: CPT

## 2023-08-03 PROCEDURE — 93005 ELECTROCARDIOGRAM TRACING: CPT | Performed by: EMERGENCY MEDICINE

## 2023-08-03 PROCEDURE — 84702 CHORIONIC GONADOTROPIN TEST: CPT | Performed by: EMERGENCY MEDICINE

## 2023-08-03 PROCEDURE — 85025 COMPLETE CBC W/AUTO DIFF WBC: CPT

## 2023-08-03 PROCEDURE — 99284 EMERGENCY DEPT VISIT MOD MDM: CPT

## 2023-08-03 PROCEDURE — 93005 ELECTROCARDIOGRAM TRACING: CPT

## 2023-08-03 RX ORDER — SODIUM CHLORIDE 0.9 % (FLUSH) 0.9 %
10 SYRINGE (ML) INJECTION AS NEEDED
Status: DISCONTINUED | OUTPATIENT
Start: 2023-08-03 | End: 2023-08-04 | Stop reason: HOSPADM

## 2023-08-04 ENCOUNTER — HOSPITAL ENCOUNTER (EMERGENCY)
Facility: HOSPITAL | Age: 18
Discharge: HOME | End: 2023-08-04
Attending: EMERGENCY MEDICINE
Payer: COMMERCIAL

## 2023-08-04 ENCOUNTER — APPOINTMENT (OUTPATIENT)
Dept: CT IMAGING | Facility: HOSPITAL | Age: 18
End: 2023-08-04
Payer: COMMERCIAL

## 2023-08-04 DIAGNOSIS — R42 DIZZY: Primary | ICD-10-CM

## 2023-08-04 DIAGNOSIS — R55 SYNCOPE, UNSPECIFIED SYNCOPE TYPE: ICD-10-CM

## 2023-08-04 DIAGNOSIS — E11.65 HYPERGLYCEMIA DUE TO DIABETES MELLITUS: ICD-10-CM

## 2023-08-04 LAB
ALBUMIN SERPL-MCNC: 4.3 G/DL (ref 3.5–5.2)
ALBUMIN/GLOB SERPL: 1.4 G/DL
ALP SERPL-CCNC: 125 U/L (ref 39–117)
ALT SERPL W P-5'-P-CCNC: 60 U/L (ref 1–33)
AMPHET+METHAMPHET UR QL: NEGATIVE
ANION GAP SERPL CALCULATED.3IONS-SCNC: 14.4 MMOL/L (ref 5–15)
AST SERPL-CCNC: 74 U/L (ref 1–32)
BARBITURATES UR QL SCN: NEGATIVE
BASOPHILS # BLD AUTO: 0.06 10*3/MM3 (ref 0–0.2)
BASOPHILS NFR BLD AUTO: 0.5 % (ref 0–1.5)
BENZODIAZ UR QL SCN: NEGATIVE
BILIRUB SERPL-MCNC: <0.2 MG/DL (ref 0–1.2)
BILIRUB UR QL STRIP: NEGATIVE
BUN SERPL-MCNC: 17 MG/DL (ref 6–20)
BUN/CREAT SERPL: 17.7 (ref 7–25)
CALCIUM SPEC-SCNC: 9.7 MG/DL (ref 8.6–10.5)
CANNABINOIDS SERPL QL: POSITIVE
CHLORIDE SERPL-SCNC: 101 MMOL/L (ref 98–107)
CLARITY UR: CLEAR
CO2 SERPL-SCNC: 20.6 MMOL/L (ref 22–29)
COCAINE UR QL: NEGATIVE
COLOR UR: YELLOW
CREAT SERPL-MCNC: 0.96 MG/DL (ref 0.57–1)
DEPRECATED RDW RBC AUTO: 37.2 FL (ref 37–54)
EGFRCR SERPLBLD CKD-EPI 2021: 87 ML/MIN/1.73
EOSINOPHIL # BLD AUTO: 0.15 10*3/MM3 (ref 0–0.4)
EOSINOPHIL NFR BLD AUTO: 1.3 % (ref 0.3–6.2)
ERYTHROCYTE [DISTWIDTH] IN BLOOD BY AUTOMATED COUNT: 12.3 % (ref 12.3–15.4)
FENTANYL UR-MCNC: NEGATIVE NG/ML
GLOBULIN UR ELPH-MCNC: 3 GM/DL
GLUCOSE SERPL-MCNC: 248 MG/DL (ref 65–99)
GLUCOSE UR STRIP-MCNC: ABNORMAL MG/DL
HCG INTACT+B SERPL-ACNC: <0.5 MIU/ML
HCT VFR BLD AUTO: 41.9 % (ref 34–46.6)
HGB BLD-MCNC: 14.8 G/DL (ref 12–15.9)
HGB UR QL STRIP.AUTO: NEGATIVE
HOLD SPECIMEN: NORMAL
HOLD SPECIMEN: NORMAL
IMM GRANULOCYTES # BLD AUTO: 0.02 10*3/MM3 (ref 0–0.05)
IMM GRANULOCYTES NFR BLD AUTO: 0.2 % (ref 0–0.5)
KETONES UR QL STRIP: NEGATIVE
LEUKOCYTE ESTERASE UR QL STRIP.AUTO: NEGATIVE
LYMPHOCYTES # BLD AUTO: 4.89 10*3/MM3 (ref 0.7–3.1)
LYMPHOCYTES NFR BLD AUTO: 42.8 % (ref 19.6–45.3)
MCH RBC QN AUTO: 29.2 PG (ref 26.6–33)
MCHC RBC AUTO-ENTMCNC: 35.3 G/DL (ref 31.5–35.7)
MCV RBC AUTO: 82.8 FL (ref 79–97)
METHADONE UR QL SCN: NEGATIVE
MONOCYTES # BLD AUTO: 0.62 10*3/MM3 (ref 0.1–0.9)
MONOCYTES NFR BLD AUTO: 5.4 % (ref 5–12)
NEUTROPHILS NFR BLD AUTO: 49.8 % (ref 42.7–76)
NEUTROPHILS NFR BLD AUTO: 5.68 10*3/MM3 (ref 1.7–7)
NITRITE UR QL STRIP: NEGATIVE
NRBC BLD AUTO-RTO: 0 /100 WBC (ref 0–0.2)
OPIATES UR QL: NEGATIVE
OXYCODONE UR QL SCN: NEGATIVE
PH UR STRIP.AUTO: 6.5 [PH] (ref 5–8)
PLATELET # BLD AUTO: 333 10*3/MM3 (ref 140–450)
PMV BLD AUTO: 9.8 FL (ref 6–12)
POTASSIUM SERPL-SCNC: 4.2 MMOL/L (ref 3.5–5.2)
PROT SERPL-MCNC: 7.3 G/DL (ref 6–8.5)
PROT UR QL STRIP: NEGATIVE
QT INTERVAL: 343 MS
RBC # BLD AUTO: 5.06 10*6/MM3 (ref 3.77–5.28)
SODIUM SERPL-SCNC: 136 MMOL/L (ref 136–145)
SP GR UR STRIP: 1.03 (ref 1–1.03)
UROBILINOGEN UR QL STRIP: ABNORMAL
WBC NRBC COR # BLD: 11.42 10*3/MM3 (ref 3.4–10.8)
WHOLE BLOOD HOLD COAG: NORMAL
WHOLE BLOOD HOLD SPECIMEN: NORMAL

## 2023-08-04 PROCEDURE — 80307 DRUG TEST PRSMV CHEM ANLYZR: CPT | Performed by: NURSE PRACTITIONER

## 2023-08-04 PROCEDURE — 81003 URINALYSIS AUTO W/O SCOPE: CPT | Performed by: EMERGENCY MEDICINE

## 2023-08-04 PROCEDURE — 70450 CT HEAD/BRAIN W/O DYE: CPT

## 2023-08-04 RX ORDER — MECLIZINE HYDROCHLORIDE 25 MG/1
25 TABLET ORAL 3 TIMES DAILY PRN
Qty: 10 TABLET | Refills: 0 | Status: SHIPPED | OUTPATIENT
Start: 2023-08-04

## 2023-08-04 NOTE — Clinical Note
[facility] EMERGENCY ROOM  [address]  [place] [address]  Phone: [phone]    Amarilis Vinson was seen and treated in our emergency department on 8/3/2023.  She may return to work on 08/05/2023.         Thank you for choosing [facility].    Judie Underwood APRN

## 2023-08-04 NOTE — DISCHARGE INSTRUCTIONS
Rest, drink plenty of fluids.  Take your meds as prescribed.  You may take over-the-counter acetaminophen and Motrin as needed for aches pains and fever.  Use caution when changing positions from lying to sitting and sitting to standing.  Do not drive a car or operate machinery until you follow-up with your family doctor and your symptoms have resolved.  Monitor your blood sugars closely.  Speak to your primary care provider about her neurological referral if your symptoms do not improve.  Return to the emergency department for any acutely developing neurological symptoms, any altered mental status, any chest pain or shortness of breath or any new or worse concerns.

## 2023-08-04 NOTE — Clinical Note
[facility] EMERGENCY ROOM  [address]  [place] [address]  Phone: [phone]    ALPESH LUEVANO accompanied Amarilis Vinson to the emergency department on 8/3/2023. They may return to work on 08/05/2023.        Thank you for choosing [facility].    Judie Underwood APRN

## 2023-08-04 NOTE — ED PROVIDER NOTES
"Time: 12:52 AM EDT  Date of encounter:  8/3/2023  Independent Historian/Clinical History and Information was obtained by:   Patient    History is limited by: N/A    Chief Complaint: DIZZY, SYNCOPE      History of Present Illness:      The patient presents to the emergency department complaining of dizziness and syncope.  She states that she will \"just be walking and then passed out \".  When asked about precipitating symptoms she states that she has none.  She denies any vision changes.  She denies any hearing changes.  She states that normally she is feeling fine prior to these episodes.  She states that sometimes she will have dizziness before the syncopal episode but also states that she has dizziness without having syncopal episodes.  She reports this has been going on for her entire life but states that her episodes have increased in the last month.  She denies any leg or calf pain.  She reports no chest pain or shortness of breath.  She reports no cough or hemoptysis.  She states she has had no recent illnesses or fevers.  She states that she feels off balance and denies any history of seizures or seizure-like activity.  She reports ports no recent falls or head trauma.  She is alert and oriented with a grossly intact neuro exam.  She denies any recent fevers.  She has no neck pain or nuchal rigidity.  She states that she has been very leiana that her boyfriend has been able to keep her from falling and hurting herself.      History provided by:  Patient   used: No      Patient Care Team  Primary Care Provider: Jasmin Vickers APRN    Past Medical History:     No Known Allergies  Past Medical History:   Diagnosis Date    Anxiety     COVID 08/07/2022    NO CURRENT SYMPTOMS    Diabetes mellitus     Foot pain, left     Hypertension     Migraines      Past Surgical History:   Procedure Laterality Date    FOOT SURGERY      DISTAL LEFT FIRST METATARSAL    HARDWARE REMOVAL Left 8/26/2022    " Procedure: HARDWARE REMOVAL;  left first metatarsal shaft;  Surgeon: Michel Jaffe DPM;  Location: [facility] MAIN OR;  Service: Podiatry;  Laterality: Left;    ULNAR OSTEOPLASTY  2018     Family History   Adopted: Yes       Home Medications:  Prior to Admission medications    Medication Sig Start Date End Date Taking? Authorizing Provider   atorvastatin (LIPITOR) 20 MG tablet Take 1 tablet by mouth Every Night. 7/21/23  Yes Jasmin Vickers APRN   escitalopram (Lexapro) 10 MG tablet Take 0.5 tab PO QHS x 1 week, if tolerated can increase to 1 tab PO QHS 7/24/23  Yes Catie Murillo PA-C   hydroCHLOROthiazide (HYDRODIURIL) 25 MG tablet Take 1 tablet by mouth Daily. 7/19/23  Yes Jasmin Vickers APRN   lisinopril (PRINIVIL,ZESTRIL) 40 MG tablet Take 1 tablet by mouth Daily. 7/19/23  Yes Jasmin Vickers APRN   aspirin-acetaminophen-caffeine (EXCEDRIN MIGRAINE) 250-250-65 MG per tablet Take  by mouth.    Edison Aaron MD   Continuous Blood Gluc  (Dexcom G6 ) device 1 each Take As Directed. 1/20/23   Jasmin Vickers APRN   Continuous Blood Gluc Sensor (Dexcom G6 Sensor) Every 10 (Ten) Days. 6/22/23   Jasmin Vickers APRN   Continuous Blood Gluc Transmit (Dexcom G6 Transmitter) misc 1 each Take As Directed. 1/20/23   Jasmin Vickers APRN   glucose blood test strip Use three times per day as needed 12/9/22   Jasmin Vickers APRN   glucose monitoring kit (FREESTYLE) monitoring kit 1 each 3 (Three) Times a Day As Needed (Blood Sugar Check). 10/18/22 10/18/23  Jasmin Vickers APRN   ibuprofen (ADVIL,MOTRIN) 200 MG tablet 2 tablets by mouth prn    Edison Aaron MD   Lancets (freestyle) lancets Use three times per day as needed to check blood sugars 10/18/22   Jasmin Vickers APRN   Lo Loestrin Fe 1 MG-10 MCG / 10 MCG tablet Take 1 tablet by mouth Daily. 12/30/22   Provider, MD Edison   ondansetron ODT (ZOFRAN-ODT) 4 MG  "disintegrating tablet Place 1 tablet on the tongue Every 4 (Four) Hours As Needed for Nausea or Vomiting. 3/22/23   Suzi Nicolas APRN   Rimegepant Sulfate (NURTEC) 75 MG tablet dispersible tablet Take 1 tablet by mouth Every Other Day As Needed (migraine). 7/19/23   Jasmin Vickers APRN        Social History:   Social History     Tobacco Use    Smoking status: Never    Smokeless tobacco: Never   Vaping Use    Vaping Use: Never used   Substance Use Topics    Alcohol use: Not Currently    Drug use: Not Currently         Review of Systems:  Review of Systems   Constitutional:  Negative for chills and fever.   HENT:  Negative for congestion, ear pain and sore throat.    Eyes:  Negative for pain and visual disturbance.   Respiratory:  Negative for cough, chest tightness, shortness of breath and wheezing.    Cardiovascular:  Negative for chest pain, palpitations and leg swelling.   Gastrointestinal:  Negative for abdominal pain, diarrhea, nausea and vomiting.   Genitourinary:  Negative for dysuria, flank pain, frequency, hematuria and urgency.   Musculoskeletal:  Negative for back pain, joint swelling, neck pain and neck stiffness.   Skin:  Negative for pallor and rash.   Neurological:  Positive for dizziness, syncope and light-headedness. Negative for tremors, seizures, facial asymmetry, speech difficulty, weakness, numbness and headaches.   All other systems reviewed and are negative.     Physical Exam:  /99 (BP Location: Right arm, Patient Position: Sitting)   Pulse 104   Temp 98.6 øF (37 øC) (Oral)   Resp 18   Ht 157.5 cm (62\")   Wt 74.9 kg (165 lb 2 oz)   LMP 07/21/2023 (Exact Date)   SpO2 98%   BMI 30.20 kg/mý     Physical Exam  Vitals and nursing note reviewed.   Constitutional:       General: She is not in acute distress.     Appearance: Normal appearance. She is not ill-appearing or toxic-appearing.   HENT:      Head: Normocephalic and atraumatic.      Right Ear: Tympanic membrane, ear canal " and external ear normal.      Left Ear: Tympanic membrane, ear canal and external ear normal.      Nose: Nose normal.      Mouth/Throat:      Mouth: Mucous membranes are moist.      Pharynx: Oropharynx is clear.   Eyes:      General: No scleral icterus.     Extraocular Movements: Extraocular movements intact.      Conjunctiva/sclera: Conjunctivae normal.      Pupils: Pupils are equal, round, and reactive to light.   Cardiovascular:      Rate and Rhythm: Normal rate and regular rhythm.      Pulses: Normal pulses.   Pulmonary:      Effort: Pulmonary effort is normal. No respiratory distress.      Breath sounds: Normal breath sounds. No wheezing.   Musculoskeletal:         General: No swelling or tenderness. Normal range of motion.      Cervical back: Normal range of motion and neck supple. No rigidity or tenderness.      Right lower leg: No edema.      Left lower leg: No edema.   Lymphadenopathy:      Cervical: No cervical adenopathy.   Skin:     General: Skin is warm and dry.      Capillary Refill: Capillary refill takes less than 2 seconds.      Findings: No rash.   Neurological:      General: No focal deficit present.      Mental Status: She is alert and oriented to person, place, and time. Mental status is at baseline.   Psychiatric:         Mood and Affect: Mood normal.         Behavior: Behavior normal.              Procedures:  Procedures      Medical Decision Making:      Comorbidities that affect care:    Anxiety/migraines, Diabetes, Hypertension    External Notes reviewed:    None      The following orders were placed and all results were independently analyzed by me:  Orders Placed This Encounter   Procedures    CT Head Without Contrast    [place] Draw    Comprehensive Metabolic Panel    Urinalysis With Microscopic If Indicated (No Culture) - Urine, Clean Catch    CBC Auto Differential    hCG, Quantitative, Pregnancy    Urine Drug Screen - Urine, Clean Catch    Undress & Gown    Continuous Pulse Oximetry     Vital Signs    ECG 12 Lead ED Triage Standing Order; Weak / Dizzy / AMS    CBC & Differential    Green Top (Gel)    Lavender Top    Gold Top - SST    Light Blue Top       Medications Given in the Emergency Department:  Medications - No data to display       ED Course:    ED Course as of 08/04/23 0550   Fri Aug 04, 2023   0033 IMPRESSION:                    1. There is new age-indeterminate inflammatory change within the right pelvis, probably centered   about the [id number] sigmoid colon.  It may represent acute-to-subacute mild-to-moderate sigmoid   diverticulitis.  Acute epiploic appendagitis is possible.  There is no pneumoperitoneum or   pneumatosis.  No mechanical bowel obstruction is seen.  No portal or mesenteric venous gas is seen   to suggest ischemic colitis.  No pericolonic fluid collection is seen to suggest abscess.  Consider   close interval clinical and imaging follow-up of the finding to ensure a benign progression and to   exclude an underlying malignant process, which is thought to be unlikely, considering the patient's   age.     2. A minimal amount of nonspecific free fluid is seen in the cul-de-sac with a CT number less than   18 Hounsfield units.  It probably represents normal free physiologic fluid.       3. No acute appendicitis.       4. There is diffuse hepatic steatosis without hepatomegaly.       5. No other definite acute findings are seen in the abdomen or pelvis by enhanced CT examination.           Please note that portions of this note were completed with a voice recognition program.     MALINA BARROSO JR, MD         Electronically Signed and Approved By: MALINA BARROSO JR, MD on 3/22/2023 at 3:01    [TC]      ED Course User Index  [TC] Judie Underwood APRN       Labs:    Lab Results (last 24 hours)       Procedure Component Value Units Date/Time    CBC & Differential [[id number]]  (Abnormal) Collected: 08/03/23 2358    Specimen: Blood Updated: 08/04/23 0002    Narrative:      The  following orders were created for panel order CBC & Differential.  Procedure                               Abnormality         Status                     ---------                               -----------         ------                     CBC Auto Differential[[id number]]        Abnormal            Final result                 Please view results for these tests on the individual orders.    Comprehensive Metabolic Panel [[id number]]  (Abnormal) Collected: 08/03/23 2358    Specimen: Blood Updated: 08/04/23 0021     Glucose 248 mg/dL      BUN 17 mg/dL      Creatinine 0.96 mg/dL      Sodium 136 mmol/L      Potassium 4.2 mmol/L      Chloride 101 mmol/L      CO2 20.6 mmol/L      Calcium 9.7 mg/dL      Total Protein 7.3 g/dL      Albumin 4.3 g/dL      ALT (SGPT) 60 U/L      AST (SGOT) 74 U/L      Alkaline Phosphatase 125 U/L      Total Bilirubin <0.2 mg/dL      Globulin 3.0 gm/dL      A/G Ratio 1.4 g/dL      BUN/Creatinine Ratio 17.7     Anion Gap 14.4 mmol/L      eGFR 87.0 mL/min/1.73     Narrative:      GFR Normal >60  Chronic Kidney Disease <60  Kidney Failure <15      CBC Auto Differential [[id number]]  (Abnormal) Collected: 08/03/23 2358    Specimen: Blood Updated: 08/04/23 0002     WBC 11.42 10*3/mm3      RBC 5.06 10*6/mm3      Hemoglobin 14.8 g/dL      Hematocrit 41.9 %      MCV 82.8 fL      MCH 29.2 pg      MCHC 35.3 g/dL      RDW 12.3 %      RDW-SD 37.2 fl      MPV 9.8 fL      Platelets 333 10*3/mm3      Neutrophil % 49.8 %      Lymphocyte % 42.8 %      Monocyte % 5.4 %      Eosinophil % 1.3 %      Basophil % 0.5 %      Immature Grans % 0.2 %      Neutrophils, Absolute 5.68 10*3/mm3      Lymphocytes, Absolute 4.89 10*3/mm3      Monocytes, Absolute 0.62 10*3/mm3      Eosinophils, Absolute 0.15 10*3/mm3      Basophils, Absolute 0.06 10*3/mm3      Immature Grans, Absolute 0.02 10*3/mm3      nRBC 0.0 /100 WBC     hCG, Quantitative, Pregnancy [[id number]] Collected: 08/03/23 2358    Specimen: Blood Updated: 08/04/23  0031     HCG Quantitative <0.50 mIU/mL     Narrative:      HCG Ranges by Gestational Age    Females - non-pregnant premenopausal   </= 1mIU/mL HCG  Females - postmenopausal               </= 7mIU/mL HCG    3 Weeks       5.4   -      72 mIU/mL  4 Weeks      10.2   -     708 mIU/mL  5 Weeks       217   -   8,245 mIU/mL  6 Weeks       152   -  32,177 mIU/mL  7 Weeks     4,059   - 153,767 mIU/mL  8 Weeks    31,366   - 149,094 mIU/mL  9 Weeks    59,109   - 135,901 mIU/mL  10 Weeks   44,186   - 170,409 mIU/mL  12 Weeks   27,107   - 201,615 mIU/mL  14 Weeks   24,302   -  93,646 mIU/mL  15 Weeks   12,540   -  69,747 mIU/mL  16 Weeks    8,904   -  55,332 mIU/mL  17 Weeks    8,240   -  51,793 mIU/mL  18 Weeks    9,649   -  55,271 mIU/mL      Urinalysis With Microscopic If Indicated (No Culture) - Urine, Clean Catch [[id number]]  (Abnormal) Collected: 08/04/23 0032    Specimen: Urine, Clean Catch Updated: 08/04/23 0043     Color, UA Yellow     Appearance, UA Clear     pH, UA 6.5     Specific Gravity, UA 1.027     Glucose, UA >=1000 mg/dL (3+)     Ketones, UA Negative     Bilirubin, UA Negative     Blood, UA Negative     Protein, UA Negative     Leuk Esterase, UA Negative     Nitrite, UA Negative     Urobilinogen, UA 1.0 E.U./dL    Narrative:      Urine microscopic not indicated.    Urine Drug Screen - Urine, Clean Catch [[id number]]  (Abnormal) Collected: 08/04/23 0032    Specimen: Urine, Clean Catch Updated: 08/04/23 0152     Amphet/Methamphet, Screen Negative     Barbiturates Screen, Urine Negative     Benzodiazepine Screen, Urine Negative     Cocaine Screen, Urine Negative     Opiate Screen Negative     THC, Screen, Urine Positive     Methadone Screen, Urine Negative     Oxycodone Screen, Urine Negative     Fentanyl, Urine Negative    Narrative:      Negative Thresholds Per Drugs Screened:    Amphetamines                 500 ng/ml  Barbiturates                 200 ng/ml  Benzodiazepines              100 ng/ml  Cocaine                       300 ng/ml  Methadone                    300 ng/ml  Opiates                      300 ng/ml  Oxycodone                    100 ng/ml  THC                           50 ng/ml  Fentanyl                       5 ng/ml      The Normal Value for all drugs tested is negative. This report includes final unconfirmed screening results to be used for medical treatment purposes only. Unconfirmed results must not be used for non-medical purposes such as employment or legal testing. Clinical consideration should be applied to any drug of abuse test, particularly when unconfirmed results are used.                     Imaging:    CT Head Without Contrast    Result Date: 8/4/2023  PROCEDURE: CT HEAD WO CONTRAST  COMPARISON: 12/20/2013, brain MRI.  INDICATIONS: DIZZINESS; HEADACHE; MULTIPLE RECENT FALLS; UNKNOWN IF PT. HIT HER HEAD.  PROTOCOL:   Standard CT imaging protocol performed.    RADIATION:   Total DLP: 954.8 mGy*cm   MA and/or KV were/was adjusted to minimize radiation dose.    TECHNIQUE: After obtaining the patient's consent, 122 CT images were obtained without non-ionic intravenous contrast material.  DISCUSSION:   A routine nonenhanced head CT was performed.  No acute brain abnormality is identified.  No acute intracranial hemorrhage.  No acute infarct is seen.  No acute skull fracture.  No midline shift or acute intracranial mass effect is seen.  The ventricular size and configuration are within normal limits.  No significant acute findings are seen with regard to the imaged orbits, paranasal sinuses, or middle ear clefts.  There is mild chronic leftward nasal septal deviation an associated nasal spur.       No acute brain abnormality is seen. Specifically, no acute intracranial hemorrhage, no acute infarct, no intracranial mass lesion, and no acute intracranial mass effect are appreciated.   Please note that portions of this note were completed with a voice recognition program.  MALINA BARROSO JR, MD        Electronically Signed and Approved By: MALINA BARROSO JR, MD on 8/04/2023 at 2:52                 Differential Diagnosis and Discussion:    Dizziness: Based on the patient's history, signs, and symptoms, the diffential diagnosis includes but is not limited to meningitis, stroke, sepsis, subarachnoid hemorrhage, intracranial bleeding, encephalitis, vertigo, electrolyte imbalance, and metabolic disorders.  Syncope: Differential diagnosis includes but is not limited to TIA, hyperventilation, aortic stenosis, pulmonary emboli, myocardial disease, bradycardia arrhythmia, heart block, tachyarrhythmia, vasovagal, orthostatic hypotension, ruptured AAA, aortic dissection, subarachnoid hemorrhage, seizure, hypoglycemia.    All labs were reviewed and interpreted by me.  CT scan radiology impression was interpreted by me.    MDM  Number of Diagnoses or Management Options  Dizzy: established and worsening  Hyperglycemia due to diabetes mellitus: established and worsening  Syncope, unspecified syncope type: established and worsening     Amount and/or Complexity of Data Reviewed  Clinical lab tests: reviewed  Tests in the radiology section of CPTr: reviewed  Tests in the medicine section of CP[facility]: reviewed  Decide to obtain previous medical records or to obtain history from someone other than the patient: yes    Risk of Complications, Morbidity, and/or Mortality  Presenting problems: low  Diagnostic procedures: low  Management options: low    Patient Progress  Patient progress: stable         Patient Care Considerations:    CT CHEST: I considered ordering a CT scan of the chest, however in the absence of any tachycardia, shortness of breath, or chest pain I did not feel it was warranted.      Consultants/Shared Management Plan:    None    Social Determinants of Health:    Patient is independent, reliable, and has access to care.       Disposition and Care Coordination:    Discharged: The patient is suitable and stable for discharge  with no need for consideration of observation or admission.    I have explained the patient's condition, diagnoses and treatment plan based on the information available to me at this time. I have answered questions and addressed any concerns. The patient has a good  understanding of the patient's diagnosis, condition, and treatment plan as can be expected at this point. The vital signs have been stable. The patient's condition is stable and appropriate for discharge from the emergency department.      The patient will pursue further outpatient evaluation with the primary care physician or other designated or consulting physician as outlined in the discharge instructions. They are agreeable to this plan of care and follow-up instructions have been explained in detail. The patient has received these instructions in written format and have expressed an understanding of the discharge instructions. The patient is aware that any significant change in condition or worsening of symptoms should prompt an immediate return to this or the closest emergency department or call to 1.  I have explained discharge medications and the need for follow up with the patient/caretakers. This was also printed in the discharge instructions. Patient was discharged with the following medications and follow up:      Medication List        New Prescriptions      meclizine 25 MG tablet  Commonly known as: ANTIVERT  Take 1 tablet by mouth 3 (Three) Times a Day As Needed for Dizziness.               Where to Get Your Medications        These medications were sent to [facility]/pharmacy #[id number] - [place], [place] - [address] - [phone]  - [phone]  [address] [place] [address]      Hours: 24-hours Phone: [phone]   meclizine 25 MG tablet      Jasmin Vickers, APRN  [address]  [address]  [place] [address]  [phone]    Call   FOR FOLLOW UP       Final diagnoses:   Dizzy   Syncope, unspecified syncope  type   Hyperglycemia due to diabetes mellitus        ED Disposition       ED Disposition   Discharge    Condition   Stable    Comment   --               This medical record created using voice recognition software.             Judie Underwood, APRN  08/04/23 0519

## 2023-08-07 ENCOUNTER — TELEPHONE (OUTPATIENT)
Dept: INTERNAL MEDICINE | Facility: CLINIC | Age: 18
End: 2023-08-07
Payer: COMMERCIAL

## 2023-08-07 NOTE — TELEPHONE ENCOUNTER
Caller: Amarilis Vinson    Relationship to patient: Self    Best call back number: [phone]      PATIENT IS REQUESTING TO SPEAK WITH PROVIDER ONLY REGARDING CONCERNS

## 2023-08-10 NOTE — TELEPHONE ENCOUNTER
Patient called back and said she has been passing out a lot lately. States she stays dizzy. Went to ER where they did an EKG and CT but did not find anything. Patient would like to see a neurologist asa due to passing out at work. Patient says it cannot wait until her next appointment.

## 2023-08-14 ENCOUNTER — TELEMEDICINE (OUTPATIENT)
Dept: BEHAVIORAL HEALTH | Facility: CLINIC | Age: 18
End: 2023-08-14
Payer: COMMERCIAL

## 2023-08-14 DIAGNOSIS — F41.0 PANIC DISORDER: ICD-10-CM

## 2023-08-14 DIAGNOSIS — F33.3 SEVERE EPISODE OF RECURRENT MAJOR DEPRESSIVE DISORDER, WITH PSYCHOTIC FEATURES: ICD-10-CM

## 2023-08-14 DIAGNOSIS — F43.10 POST TRAUMATIC STRESS DISORDER (PTSD): ICD-10-CM

## 2023-08-14 DIAGNOSIS — F41.1 GENERALIZED ANXIETY DISORDER: Primary | ICD-10-CM

## 2023-08-14 PROCEDURE — 99214 OFFICE O/P EST MOD 30 MIN: CPT | Performed by: PHYSICIAN ASSISTANT

## 2023-08-14 RX ORDER — HYDROXYZINE HYDROCHLORIDE 10 MG/1
TABLET, FILM COATED ORAL
Qty: 120 TABLET | Refills: 1 | Status: SHIPPED | OUTPATIENT
Start: 2023-08-14

## 2023-08-14 RX ORDER — ESCITALOPRAM OXALATE 10 MG/1
15 TABLET ORAL DAILY
Qty: 45 TABLET | Refills: 1 | Status: SHIPPED | OUTPATIENT
Start: 2023-08-14 | End: 2023-09-13

## 2023-08-14 NOTE — PROGRESS NOTES
This provider is located at [address] [facility], [address], [place] [address]. The Patient is seen remotely using [employer]. Patient is being seen via telehealth and confirm that they are in a secure environment for this session. The patient's condition being diagnosed/treated is appropriate for telemedicine. The provider identified herself as well as her credentials.   The patient gave consent to be seen remotely, and when consent is given they understand that the consent allows for patient identifiable information to be sent to a third party as needed.   They may refuse to be seen remotely at any time. The electronic data is encrypted and password protected, and the patient has been advised of the potential risks to privacy not withstanding such measures.    Patient is accepting of and agreeable to appointment.  The appointment consisted of the patient and I only.      Mode of visit: Video  Location of provider: [address] [facility] [address], [address], [place] [address].  Location of patient: Home  Does the patient consent to use a video/audio connection for your medical care today? Yes  The visit included audio and video interaction. No technical issues occurred during this visit.    Chief Complaint:  Depression, anxiety     History of Present Illness: Amarilis Vinson is a 18 y.o. female who presents today for f/u of mood. Pt has been taking Lexapro as prescribed and tolerating well without any issues. Pt reports being compliant with Lexapro. Pt reports depression has been less frequent, comes and goes, occurs once a week, rates it a 5/10. Pt admits to having some hopelessness. Pt denies having any SI or HI. She has had some difficulty sleeping only occurring this week, attributes to environmental factors. Pt continues to have anxiety, still constant, rates it a 10/10. Pt reports prior to this week her anxiety was a little better. Pt has been having panic attacks, last episode was this morning. She  continues to feel on edge, easily irritable, restlessness, no change. She continues to have anxiety in social situations. Pt will have flashbacks and reoccurring intrusive thoughts about past trauma, that occurs once a week. Pt will have nightmares, once a week. Pt will hear mumbling that occurs once a week, sometimes twice a week, no change. Pt denies visual hallucinations.       Medical Record Review: Reviewed office visit note from 7/19/23, Depression  Visit Type: follow up   Progression since onset: improving   Patient presents with the following symptoms: decreased concentration, depressed mood, excessive worry, fatigue, irritability, nervousness/anxiety and suicidal ideas.  Patient is not experiencing: anhedonia, chest pain, choking sensation, compulsions, confusion, dizziness, dry mouth, feelings of hopelessness, feelings of worthlessness, hypersomnia, hyperventilation, impotence, insomnia, malaise, memory impairment, muscle tension, nausea, obsessions, palpitations, panic, psychomotor agitation, psychomotor retardation, restlessness, shortness of breath, suicidal planning, thoughts of death and weight gain.  Severity: mild  States that she lost her Zoloft and her move and abruptly stopped taking it.      PHQ-9 Depression Screening  Little interest or pleasure in doing things?     Feeling down, depressed, or hopeless?     Trouble falling or staying asleep, or sleeping too much?     Feeling tired or having little energy?     Poor appetite or overeating?     Feeling bad about yourself - or that you are a failure or have let yourself or your family down?     Trouble concentrating on things, such as reading the newspaper or watching television?     Moving or speaking so slowly that other people could have noticed? Or the opposite - being so fidgety or restless that you have been moving around a lot more than usual?     Thoughts that you would be better off dead, or of hurting yourself in some way?     PHQ-9 Total  Score     If you checked off any problems, how difficult have these problems made it for you to do your work, take care of things at home, or get along with other people?           FLAQUITA-7         ROS:  Review of Systems   Constitutional:  Negative for appetite change, diaphoresis, fatigue and unexpected weight change.   HENT:  Negative for drooling, tinnitus and trouble swallowing.    Eyes:  Negative for visual disturbance.   Respiratory:  Negative for cough, chest tightness and shortness of breath.    Cardiovascular:  Negative for chest pain and palpitations.   Gastrointestinal:  Negative for abdominal pain, constipation, diarrhea, nausea and vomiting.   Endocrine: Negative for cold intolerance and heat intolerance.   Genitourinary:  Negative for difficulty urinating.   Musculoskeletal:  Negative for arthralgias and myalgias.   Skin:  Negative for rash.   Allergic/Immunologic: Negative for immunocompromised state.   Neurological:  Negative for dizziness, tremors, seizures and headaches.   Psychiatric/Behavioral:  Positive for agitation, dysphoric mood, hallucinations and sleep disturbance. Negative for self-injury and suicidal ideas. The patient is nervous/anxious.      Problem List:  Patient Active Problem List   Diagnosis    Presence of retained hardware    Class 1 obesity with serious comorbidity and body mass index (BMI) of 30.0 to 30.9 in adult    Hypertension    Migraine    Type 2 diabetes mellitus with hyperglycemia, without long-term current use of insulin    Anxiety    Major depressive disorder, recurrent episode, mild degree    ADHD (attention deficit hyperactivity disorder)    Hypertriglyceridemia    Noncompliance    Adopted    Aftercare following surgery    Bunion    Elevated hemoglobin A1c    Graves disease    Insulin resistance    Pain in wrist    Sprain of wrist       Current Medications:   Current Outpatient Medications   Medication Sig Dispense Refill    escitalopram (Lexapro) 10 MG tablet Take 1.5  tablets by mouth Daily for 30 days. 45 tablet 1    aspirin-acetaminophen-caffeine (EXCEDRIN MIGRAINE) 250-250-65 MG per tablet Take  by mouth.      atorvastatin (LIPITOR) 20 MG tablet Take 1 tablet by mouth Every Night. 90 tablet 1    Continuous Blood Gluc  (Dexcom G6 ) device 1 each Take As Directed. 1 each 2    Continuous Blood Gluc Sensor (Dexcom G6 Sensor) Every 10 (Ten) Days. 6 each 2    Continuous Blood Gluc Transmit (Dexcom G6 Transmitter) misc 1 each Take As Directed. 2 each 3    glucose blood test strip Use three times per day as needed 400 each 12    glucose monitoring kit (FREESTYLE) monitoring kit 1 each 3 (Three) Times a Day As Needed (Blood Sugar Check). 1 each 0    hydroCHLOROthiazide (HYDRODIURIL) 25 MG tablet Take 1 tablet by mouth Daily. 90 tablet 1    hydrOXYzine (ATARAX) 10 MG tablet Take 1-3 tab PO BID PRN anxiety 120 tablet 1    ibuprofen (ADVIL,MOTRIN) 200 MG tablet 2 tablets by mouth prn      Lancets (freestyle) lancets Use three times per day as needed to check blood sugars 100 each 3    lisinopril (PRINIVIL,ZESTRIL) 40 MG tablet Take 1 tablet by mouth Daily. 90 tablet 1    Lo Loestrin Fe 1 MG-10 MCG / 10 MCG tablet Take 1 tablet by mouth Daily.      meclizine (ANTIVERT) 25 MG tablet Take 1 tablet by mouth 3 (Three) Times a Day As Needed for Dizziness. 10 tablet 0    ondansetron ODT (ZOFRAN-ODT) 4 MG disintegrating tablet Place 1 tablet on the tongue Every 4 (Four) Hours As Needed for Nausea or Vomiting. 15 tablet 0    Rimegepant Sulfate (NURTEC) 75 MG tablet dispersible tablet Take 1 tablet by mouth Every Other Day As Needed (migraine). 16 tablet 0     Current Facility-Administered Medications   Medication Dose Route Frequency Provider Last Rate Last Admin    cloNIDine (CATAPRES) tablet 0.1 mg  0.1 mg Oral Q12H Jasmin Vickers APRN           Discontinued Medications:  Medications Discontinued During This Encounter   Medication Reason    escitalopram (Lexapro) 10 MG  tablet Reorder       Allergy:   No Known Allergies     Past Medical History:  Past Medical History:   Diagnosis Date    Anxiety     COVID 08/07/2022    NO CURRENT SYMPTOMS    Diabetes mellitus     Foot pain, left     Hypertension     Migraines        Past Surgical History:  Past Surgical History:   Procedure Laterality Date    FOOT SURGERY      DISTAL LEFT FIRST METATARSAL    HARDWARE REMOVAL Left 8/26/2022    Procedure: HARDWARE REMOVAL;  left first metatarsal shaft;  Surgeon: Michel Jaffe DPM;  Location: [facility] OR;  Service: Podiatry;  Laterality: Left;    ULNAR OSTEOPLASTY  2018       Past Psychiatric History:  Began Treatment: 2 years ago  Diagnoses: Anxiety, Depression  Psychiatrist: Denies  Therapist: Pt had a couple therapy appointments, last seen 5 years ago.   Admission History: Denies  Medications/Treatment: Zoloft  Self Harm: H/o self harm with cutting herself, which she last did 6 months ago.   Suicide Attempts: Denies  Postpartum depression: N/A    Family Psychiatric History:   Diagnoses: Her father had a h/o depression and anxiety  Substance use: Her mother and father have a h/o drug abuse.  Suicide Attempts/Completions: Her father completed suicide.     Family History   Adopted: Yes       Substance Abuse History:   Alcohol use: Denies  Nicotine: Denies  Illicit Drug Use: Denies  Longest Period Sober: Denies  Rehab/AA/NA: Denies    Social History:  Living Situation: Pt lives with boyfriend and 3 friends.   Marital/Relationship History: Pt has been with boyfriend for 1 year. No abuse or trauma.   Children: Denies  Work History/Occupation: Pt works at [employer].   Education: Pt completed high school, currently doing online college.    History: Denies  Legal: Denies    Social History     Socioeconomic History    Marital status: Single   Tobacco Use    Smoking status: Never    Smokeless tobacco: Never   Vaping Use    Vaping Use: Never used   Substance and Sexual Activity     "Alcohol use: Not Currently    Drug use: Not Currently    Sexual activity: Yes       Developmental History:   Place of birth: [facility].   Siblings: 3 full biological siblings, 9 half siblings.   Childhood: Pt denies any abuse, trauma, or neglect with her adopted family.       Physical Exam:  Physical Exam    Appearance: appears to be of stated age, maintains good eye contact.   Behavior: Appropriate, cooperative, although slightly guarded at beginning of visit. No acute distress.  Motor: No abnormal movements  Speech: Coherent, spontaneous, appropriate with normal rate, volume, rhythm, and tone. Normal reaction time to questions. No hyperverbal or pressured speech.   Mood: \"I'm okay\"  Affect: Pt appears depressed and slightly anxious  Thought content: Negative suicidal ideations, negative homicidal ideations. Patient denies any obsession, compulsion, or phobia. No evidence of delusions.  Perceptions: Negative auditory hallucinations, negative visual hallucinations. Pt does not appear to be actively responding to internal stimuli.   Thought process: Logical, goal-directed, coherent, and linear with no evidence of flight of ideas, looseness of associations, thought blocking, circumstantiality, or tangentiality.   Insight/Judgement: Fair/fair  Cognition: Alert and oriented to person, place, and date. Memory intact for recent and remote events. Attention and concentration intact.     Vital Signs:   There were no vitals taken for this visit.     Lab Results:   Admission on 08/04/2023, Discharged on 08/04/2023   Component Date Value Ref Range Status    QT Interval 08/03/2023 343  ms Final    Glucose 08/03/2023 248 (H)  65 - 99 mg/dL Final    BUN 08/03/2023 17  6 - 20 mg/dL Final    Creatinine 08/03/2023 0.96  0.57 - 1.00 mg/dL Final    Sodium 08/03/2023 136  136 - 145 mmol/L Final    Potassium 08/03/2023 4.2  3.5 - 5.2 mmol/L Final    Chloride 08/03/2023 101  98 - 107 mmol/L Final    CO2 08/03/2023 20.6 (L)  22.0 - " 29.0 mmol/L Final    Calcium 08/03/2023 9.7  8.6 - 10.5 mg/dL Final    Total Protein 08/03/2023 7.3  6.0 - 8.5 g/dL Final    Albumin 08/03/2023 4.3  3.5 - 5.2 g/dL Final    ALT (SGPT) 08/03/2023 60 (H)  1 - 33 U/L Final    AST (SGOT) 08/03/2023 74 (H)  1 - 32 U/L Final    Alkaline Phosphatase 08/03/2023 125 (H)  39 - 117 U/L Final    Total Bilirubin 08/03/2023 <0.2  0.0 - 1.2 mg/dL Final    Globulin 08/03/2023 3.0  gm/dL Final    A/G Ratio 08/03/2023 1.4  g/dL Final    BUN/Creatinine Ratio 08/03/2023 17.7  7.0 - 25.0 Final    Anion Gap 08/03/2023 14.4  5.0 - 15.0 mmol/L Final    eGFR 08/03/2023 87.0  >60.0 mL/min/1.73 Final    Color, UA 08/04/2023 Yellow  Yellow, Straw Final    Appearance, UA 08/04/2023 Clear  Clear Final    pH, UA 08/04/2023 6.5  5.0 - 8.0 Final    Specific Gravity, UA 08/04/2023 1.027  1.005 - 1.030 Final    Glucose, UA 08/04/2023 >=1000 mg/dL (3+) (A)  Negative Final    Ketones, UA 08/04/2023 Negative  Negative Final    Bilirubin, UA 08/04/2023 Negative  Negative Final    Blood, UA 08/04/2023 Negative  Negative Final    Protein, UA 08/04/2023 Negative  Negative Final    Leuk Esterase, UA 08/04/2023 Negative  Negative Final    Nitrite, UA 08/04/2023 Negative  Negative Final    Urobilinogen, UA 08/04/2023 1.0 E.U./dL  0.2 - 1.0 E.U./dL Final    Extra Tube 08/03/2023 Hold for add-ons.   Final    Auto resulted.    Extra Tube 08/03/2023 hold for add-on   Final    Auto resulted    Extra Tube 08/03/2023 Hold for add-ons.   Final    Auto resulted.    Extra Tube 08/03/2023 Hold for add-ons.   Final    Auto resulted    WBC 08/03/2023 11.42 (H)  3.40 - 10.80 10*3/mm3 Final    RBC 08/03/2023 5.06  3.77 - 5.28 10*6/mm3 Final    Hemoglobin 08/03/2023 14.8  12.0 - 15.9 g/dL Final    Hematocrit 08/03/2023 41.9  34.0 - 46.6 % Final    MCV 08/03/2023 82.8  79.0 - 97.0 fL Final    MCH 08/03/2023 29.2  26.6 - 33.0 pg Final    MCHC 08/03/2023 35.3  31.5 - 35.7 g/dL Final    RDW 08/03/2023 12.3  12.3 - 15.4 % Final     RDW-SD 08/03/2023 37.2  37.0 - 54.0 fl Final    MPV 08/03/2023 9.8  6.0 - 12.0 fL Final    Platelets 08/03/2023 333  140 - 450 10*3/mm3 Final    Neutrophil % 08/03/2023 49.8  42.7 - 76.0 % Final    Lymphocyte % 08/03/2023 42.8  19.6 - 45.3 % Final    Monocyte % 08/03/2023 5.4  5.0 - 12.0 % Final    Eosinophil % 08/03/2023 1.3  0.3 - 6.2 % Final    Basophil % 08/03/2023 0.5  0.0 - 1.5 % Final    Immature Grans % 08/03/2023 0.2  0.0 - 0.5 % Final    Neutrophils, Absolute 08/03/2023 5.68  1.70 - 7.00 10*3/mm3 Final    Lymphocytes, Absolute 08/03/2023 4.89 (H)  0.70 - 3.10 10*3/mm3 Final    Monocytes, Absolute 08/03/2023 0.62  0.10 - 0.90 10*3/mm3 Final    Eosinophils, Absolute 08/03/2023 0.15  0.00 - 0.40 10*3/mm3 Final    Basophils, Absolute 08/03/2023 0.06  0.00 - 0.20 10*3/mm3 Final    Immature Grans, Absolute 08/03/2023 0.02  0.00 - 0.05 10*3/mm3 Final    nRBC 08/03/2023 0.0  0.0 - 0.2 /100 WBC Final    HCG Quantitative 08/03/2023 <0.50  mIU/mL Final    Amphet/Methamphet, Screen 08/04/2023 Negative  Negative Final    Barbiturates Screen, Urine 08/04/2023 Negative  Negative Final    Benzodiazepine Screen, Urine 08/04/2023 Negative  Negative Final    Cocaine Screen, Urine 08/04/2023 Negative  Negative Final    Opiate Screen 08/04/2023 Negative  Negative Final    THC, Screen, Urine 08/04/2023 Positive (A)  Negative Final    Methadone Screen, Urine 08/04/2023 Negative  Negative Final    Oxycodone Screen, Urine 08/04/2023 Negative  Negative Final    Fentanyl, Urine 08/04/2023 Negative  Negative Final   Office Visit on 07/19/2023   Component Date Value Ref Range Status    Total Cholesterol 07/19/2023 197  0 - 200 mg/dL Final    Triglycerides 07/19/2023 287 (H)  0 - 150 mg/dL Final    HDL Cholesterol 07/19/2023 29 (L)  40 - 60 mg/dL Final    LDL Cholesterol  07/19/2023 118 (H)  0 - 100 mg/dL Final    VLDL Cholesterol 07/19/2023 50 (H)  5 - 40 mg/dL Final    LDL/HDL Ratio 07/19/2023 3.81   Final    Glucose 07/19/2023 102  (H)  65 - 99 mg/dL Final    BUN 07/19/2023 11  6 - 20 mg/dL Final    Creatinine 07/19/2023 0.86  0.57 - 1.00 mg/dL Final    Sodium 07/19/2023 140  136 - 145 mmol/L Final    Potassium 07/19/2023 4.2  3.5 - 5.2 mmol/L Final    Chloride 07/19/2023 103  98 - 107 mmol/L Final    CO2 07/19/2023 21.4 (L)  22.0 - 29.0 mmol/L Final    Calcium 07/19/2023 10.0  8.6 - 10.5 mg/dL Final    Total Protein 07/19/2023 7.4  6.0 - 8.5 g/dL Final    Albumin 07/19/2023 4.7  3.5 - 5.2 g/dL Final    ALT (SGPT) 07/19/2023 62 (H)  1 - 33 U/L Final    AST (SGOT) 07/19/2023 102 (H)  1 - 32 U/L Final    Alkaline Phosphatase 07/19/2023 110  39 - 117 U/L Final    Total Bilirubin 07/19/2023 0.2  0.0 - 1.2 mg/dL Final    Globulin 07/19/2023 2.7  gm/dL Final    A/G Ratio 07/19/2023 1.7  g/dL Final    BUN/Creatinine Ratio 07/19/2023 12.8  7.0 - 25.0 Final    Anion Gap 07/19/2023 15.6 (H)  5.0 - 15.0 mmol/L Final    eGFR 07/19/2023 99.3  >60.0 mL/min/1.73 Final    Hemoglobin A1C 07/19/2023 6.70 (H)  4.80 - 5.60 % Final    WBC 07/19/2023 9.42  3.40 - 10.80 10*3/mm3 Final    RBC 07/19/2023 5.83 (H)  3.77 - 5.28 10*6/mm3 Final    Hemoglobin 07/19/2023 16.3 (H)  12.0 - 15.9 g/dL Final    Hematocrit 07/19/2023 47.4 (H)  34.0 - 46.6 % Final    MCV 07/19/2023 81.3  79.0 - 97.0 fL Final    MCH 07/19/2023 28.0  26.6 - 33.0 pg Final    MCHC 07/19/2023 34.4  31.5 - 35.7 g/dL Final    RDW 07/19/2023 13.7  12.3 - 15.4 % Final    RDW-SD 07/19/2023 39.9  37.0 - 54.0 fl Final    MPV 07/19/2023 10.3  6.0 - 12.0 fL Final    Platelets 07/19/2023 312  140 - 450 10*3/mm3 Final    Neutrophil % 07/19/2023 53.8  42.7 - 76.0 % Final    Lymphocyte % 07/19/2023 38.9  19.6 - 45.3 % Final    Monocyte % 07/19/2023 5.2  5.0 - 12.0 % Final    Eosinophil % 07/19/2023 1.3  0.3 - 6.2 % Final    Basophil % 07/19/2023 0.6  0.0 - 1.5 % Final    Immature Grans % 07/19/2023 0.2  0.0 - 0.5 % Final    Neutrophils, Absolute 07/19/2023 5.07  1.70 - 7.00 10*3/mm3 Final    Lymphocytes, Absolute  07/19/2023 3.66 (H)  0.70 - 3.10 10*3/mm3 Final    Monocytes, Absolute 07/19/2023 0.49  0.10 - 0.90 10*3/mm3 Final    Eosinophils, Absolute 07/19/2023 0.12  0.00 - 0.40 10*3/mm3 Final    Basophils, Absolute 07/19/2023 0.06  0.00 - 0.20 10*3/mm3 Final    Immature Grans, Absolute 07/19/2023 0.02  0.00 - 0.05 10*3/mm3 Final    nRBC 07/19/2023 0.0  0.0 - 0.2 /100 WBC Final   Admission on 03/21/2023, Discharged on 03/22/2023   Component Date Value Ref Range Status    Glucose 03/21/2023 119 (H)  65 - 99 mg/dL Final    BUN 03/21/2023 13  6 - 20 mg/dL Final    Creatinine 03/21/2023 0.90  0.57 - 1.00 mg/dL Final    Sodium 03/21/2023 136  136 - 145 mmol/L Final    Potassium 03/21/2023 4.0  3.5 - 5.2 mmol/L Final    Chloride 03/21/2023 101  98 - 107 mmol/L Final    CO2 03/21/2023 20.0 (L)  22.0 - 29.0 mmol/L Final    Calcium 03/21/2023 9.4  8.6 - 10.5 mg/dL Final    Total Protein 03/21/2023 7.6  6.0 - 8.5 g/dL Final    Albumin 03/21/2023 4.3  3.5 - 5.2 g/dL Final    ALT (SGPT) 03/21/2023 84 (H)  1 - 33 U/L Final    AST (SGOT) 03/21/2023 213 (H)  1 - 32 U/L Final    Alkaline Phosphatase 03/21/2023 105  39 - 117 U/L Final    Total Bilirubin 03/21/2023 0.3  0.0 - 1.2 mg/dL Final    Globulin 03/21/2023 3.3  gm/dL Final    A/G Ratio 03/21/2023 1.3  g/dL Final    BUN/Creatinine Ratio 03/21/2023 14.4  7.0 - 25.0 Final    Anion Gap 03/21/2023 15.0  5.0 - 15.0 mmol/L Final    eGFR 03/21/2023 94.1  >60.0 mL/min/1.73 Final    Lipase 03/21/2023 18  13 - 60 U/L Final    Color, UA 03/21/2023 Yellow  Yellow, Straw Final    Appearance, UA 03/21/2023 Clear  Clear Final    pH, UA 03/21/2023 5.5  5.0 - 8.0 Final    Specific Gravity, UA 03/21/2023 >1.030 (H)  1.005 - 1.030 Final    Glucose, UA 03/21/2023 >=1000 mg/dL (3+) (A)  Negative Final    Ketones, UA 03/21/2023 Trace (A)  Negative Final    Bilirubin, UA 03/21/2023 Negative  Negative Final    Blood, UA 03/21/2023 Negative  Negative Final    Protein, UA 03/21/2023 Trace (A)  Negative Final     Leuk Esterase, UA 03/21/2023 Negative  Negative Final    Nitrite, UA 03/21/2023 Negative  Negative Final    Urobilinogen, UA 03/21/2023 0.2 E.U./dL  0.2 - 1.0 E.U./dL Final    HCG Quantitative 03/21/2023 <0.50  mIU/mL Final    Extra Tube 03/21/2023 Hold for add-ons.   Final    Auto resulted.    Extra Tube 03/21/2023 hold for add-on   Final    Auto resulted    Extra Tube 03/21/2023 Hold for add-ons.   Final    Auto resulted.    Extra Tube 03/21/2023 Hold for add-ons.   Final    Auto resulted    WBC 03/21/2023 10.65  3.40 - 10.80 10*3/mm3 Final    RBC 03/21/2023 5.58 (H)  3.77 - 5.28 10*6/mm3 Final    Hemoglobin 03/21/2023 15.9  12.0 - 15.9 g/dL Final    Hematocrit 03/21/2023 44.4  34.0 - 46.6 % Final    MCV 03/21/2023 79.6  79.0 - 97.0 fL Final    MCH 03/21/2023 28.5  26.6 - 33.0 pg Final    MCHC 03/21/2023 35.8 (H)  31.5 - 35.7 g/dL Final    RDW 03/21/2023 11.6 (L)  12.3 - 15.4 % Final    RDW-SD 03/21/2023 33.4 (L)  37.0 - 54.0 fl Final    MPV 03/21/2023 9.3  6.0 - 12.0 fL Final    Platelets 03/21/2023 307  140 - 450 10*3/mm3 Final    Neutrophil % 03/21/2023 76.3 (H)  42.7 - 76.0 % Final    Lymphocyte % 03/21/2023 15.5 (L)  19.6 - 45.3 % Final    Monocyte % 03/21/2023 6.9  5.0 - 12.0 % Final    Eosinophil % 03/21/2023 0.7  0.3 - 6.2 % Final    Basophil % 03/21/2023 0.3  0.0 - 1.5 % Final    Immature Grans % 03/21/2023 0.3  0.0 - 0.5 % Final    Neutrophils, Absolute 03/21/2023 8.14 (H)  1.70 - 7.00 10*3/mm3 Final    Lymphocytes, Absolute 03/21/2023 1.65  0.70 - 3.10 10*3/mm3 Final    Monocytes, Absolute 03/21/2023 0.73  0.10 - 0.90 10*3/mm3 Final    Eosinophils, Absolute 03/21/2023 0.07  0.00 - 0.40 10*3/mm3 Final    Basophils, Absolute 03/21/2023 0.03  0.00 - 0.20 10*3/mm3 Final    Immature Grans, Absolute 03/21/2023 0.03  0.00 - 0.05 10*3/mm3 Final    nRBC 03/21/2023 0.0  0.0 - 0.2 /100 WBC Final    Glucose 03/21/2023 128 (H)  70 - 99 mg/dL Final    Serial Number: [id number]  [id number]   Office Visit on  01/20/2023   Component Date Value Ref Range Status    MONTY Direct 01/20/2023 Negative  Negative Final    CCP Antibodies IgG/IgA 01/20/2023 1  0 - 19 units Final                              Negative               <20                            Weak positive      20 - 39                            Moderate positive  40 - 59                            Strong positive        >59    Creatine Kinase 01/20/2023 35  U/L Final    C-Reactive Protein 01/20/2023 1.61 (H)  0.00 - 0.50 mg/dL Final    Rheumatoid Factor Quantitative 01/20/2023 <10.0  0.0 - 14.0 IU/mL Final    Sed Rate 01/20/2023 22 (H)  0 - 20 mm/hr Final    Total Cholesterol 01/20/2023 183  0 - 200 mg/dL Final    Triglycerides 01/20/2023 258 (H)  0 - 150 mg/dL Final    HDL Cholesterol 01/20/2023 27 (L)  40 - 60 mg/dL Final    LDL Cholesterol  01/20/2023 111 (H)  0 - 100 mg/dL Final    VLDL Cholesterol 01/20/2023 45 (H)  5 - 40 mg/dL Final    LDL/HDL Ratio 01/20/2023 3.87   Final    TSH 01/20/2023 3.590  0.270 - 4.200 uIU/mL Final    Glucose 01/20/2023 106 (H)  65 - 99 mg/dL Final    BUN 01/20/2023 14  6 - 20 mg/dL Final    Creatinine 01/20/2023 0.82  0.57 - 1.00 mg/dL Final    Sodium 01/20/2023 135 (L)  136 - 145 mmol/L Final    Potassium 01/20/2023 4.3  3.5 - 5.2 mmol/L Final    Chloride 01/20/2023 102  98 - 107 mmol/L Final    CO2 01/20/2023 23.0  22.0 - 29.0 mmol/L Final    Calcium 01/20/2023 9.7  8.6 - 10.5 mg/dL Final    Total Protein 01/20/2023 7.1  6.0 - 8.5 g/dL Final    Albumin 01/20/2023 4.4  3.5 - 5.2 g/dL Final    ALT (SGPT) 01/20/2023 39 (H)  1 - 33 U/L Final    AST (SGOT) 01/20/2023 51 (H)  1 - 32 U/L Final    Alkaline Phosphatase 01/20/2023 88  39 - 117 U/L Final    Total Bilirubin 01/20/2023 0.2  0.0 - 1.2 mg/dL Final    Globulin 01/20/2023 2.7  gm/dL Final    A/G Ratio 01/20/2023 1.6  g/dL Final    BUN/Creatinine Ratio 01/20/2023 17.1  7.0 - 25.0 Final    Anion Gap 01/20/2023 10.0  5.0 - 15.0 mmol/L Final    eGFR 01/20/2023 105.8  >60.0  mL/min/1.73 Final    Hemoglobin A1C 01/20/2023 7.70 (H)  4.80 - 5.60 % Final    WBC 01/20/2023 9.14  3.40 - 10.80 10*3/mm3 Final    RBC 01/20/2023 4.83  3.77 - 5.28 10*6/mm3 Final    Hemoglobin 01/20/2023 13.8  12.0 - 15.9 g/dL Final    Hematocrit 01/20/2023 40.9  34.0 - 46.6 % Final    MCV 01/20/2023 84.7  79.0 - 97.0 fL Final    MCH 01/20/2023 28.6  26.6 - 33.0 pg Final    MCHC 01/20/2023 33.7  31.5 - 35.7 g/dL Final    RDW 01/20/2023 13.9  12.3 - 15.4 % Final    RDW-SD 01/20/2023 42.4  37.0 - 54.0 fl Final    MPV 01/20/2023 10.0  6.0 - 12.0 fL Final    Platelets 01/20/2023 358  140 - 450 10*3/mm3 Final    Neutrophil % 01/20/2023 57.2  42.7 - 76.0 % Final    Lymphocyte % 01/20/2023 34.1  19.6 - 45.3 % Final    Monocyte % 01/20/2023 6.3  5.0 - 12.0 % Final    Eosinophil % 01/20/2023 1.6  0.3 - 6.2 % Final    Basophil % 01/20/2023 0.5  0.0 - 1.5 % Final    Immature Grans % 01/20/2023 0.3  0.0 - 0.5 % Final    Neutrophils, Absolute 01/20/2023 5.21  1.70 - 7.00 10*3/mm3 Final    Lymphocytes, Absolute 01/20/2023 3.12 (H)  0.70 - 3.10 10*3/mm3 Final    Monocytes, Absolute 01/20/2023 0.58  0.10 - 0.90 10*3/mm3 Final    Eosinophils, Absolute 01/20/2023 0.15  0.00 - 0.40 10*3/mm3 Final    Basophils, Absolute 01/20/2023 0.05  0.00 - 0.20 10*3/mm3 Final    Immature Grans, Absolute 01/20/2023 0.03  0.00 - 0.05 10*3/mm3 Final    nRBC 01/20/2023 0.0  0.0 - 0.2 /100 WBC Final   Admission on 01/18/2023, Discharged on 01/18/2023   Component Date Value Ref Range Status    SARS Antigen 01/18/2023 Not Detected   Final    Internal Control 01/18/2023 Passed   Final    Lot Number 01/18/2023 [id number]   Final    Expiration Date 01/18/2023 11/09/2023   Final    Rapid Influenza A Ag 01/18/2023 Negative   Final    Rapid Influenza B Ag 01/18/2023 Negative   Final    Internal Control 01/18/2023 Passed   Final    Lot Number 01/18/2023 [id number]   Final    Expiration Date 01/18/2023 11/09/2023   Final   Admission on 12/24/2022, Discharged on  12/24/2022   Component Date Value Ref Range Status    Glucose 12/24/2022 127 (H)  65 - 99 mg/dL Final    BUN 12/24/2022 17  6 - 20 mg/dL Final    Creatinine 12/24/2022 0.88  0.57 - 1.00 mg/dL Final    Sodium 12/24/2022 134 (L)  136 - 145 mmol/L Final    Potassium 12/24/2022 4.7  3.5 - 5.2 mmol/L Final    Slight hemolysis detected by analyzer. Results may be affected.    Chloride 12/24/2022 97 (L)  98 - 107 mmol/L Final    CO2 12/24/2022 20.7 (L)  22.0 - 29.0 mmol/L Final    Calcium 12/24/2022 9.9  8.6 - 10.5 mg/dL Final    Total Protein 12/24/2022 7.5  6.0 - 8.5 g/dL Final    Albumin 12/24/2022 4.40  3.50 - 5.20 g/dL Final    ALT (SGPT) 12/24/2022 34 (H)  1 - 33 U/L Final    AST (SGOT) 12/24/2022 45 (H)  1 - 32 U/L Final    Slight hemolysis detected by analyzer. Results may be affected.    Alkaline Phosphatase 12/24/2022 110  39 - 117 U/L Final    Total Bilirubin 12/24/2022 0.2  0.0 - 1.2 mg/dL Final    Globulin 12/24/2022 3.1  gm/dL Final    A/G Ratio 12/24/2022 1.4  g/dL Final    BUN/Creatinine Ratio 12/24/2022 19.3  7.0 - 25.0 Final    Anion Gap 12/24/2022 16.3 (H)  5.0 - 15.0 mmol/L Final    eGFR 12/24/2022 97.2  >60.0 mL/min/1.73 Final    National Kidney Foundation and American Society of Nephrology (ASN) Task Force recommended calculation based on the Chronic Kidney Disease Epidemiology Collaboration (CKD-EPI) equation refit without adjustment for race.    WBC 12/24/2022 14.07 (H)  3.40 - 10.80 10*3/mm3 Final    RBC 12/24/2022 5.20  3.77 - 5.28 10*6/mm3 Final    Hemoglobin 12/24/2022 14.9  12.0 - 15.9 g/dL Final    Hematocrit 12/24/2022 45.1  34.0 - 46.6 % Final    MCV 12/24/2022 86.7  79.0 - 97.0 fL Final    MCH 12/24/2022 28.7  26.6 - 33.0 pg Final    MCHC 12/24/2022 33.0  31.5 - 35.7 g/dL Final    RDW 12/24/2022 13.4  12.3 - 15.4 % Final    RDW-SD 12/24/2022 42.5  37.0 - 54.0 fl Final    MPV 12/24/2022 9.1  6.0 - 12.0 fL Final    Platelets 12/24/2022 373  140 - 450 10*3/mm3 Final    Neutrophil % 12/24/2022  53.3  42.7 - 76.0 % Final    Lymphocyte % 12/24/2022 36.4  19.6 - 45.3 % Final    Monocyte % 12/24/2022 7.8  5.0 - 12.0 % Final    Eosinophil % 12/24/2022 1.8  0.3 - 6.2 % Final    Basophil % 12/24/2022 0.4  0.0 - 1.5 % Final    Immature Grans % 12/24/2022 0.3  0.0 - 0.5 % Final    Neutrophils, Absolute 12/24/2022 7.50 (H)  1.70 - 7.00 10*3/mm3 Final    Lymphocytes, Absolute 12/24/2022 5.12 (H)  0.70 - 3.10 10*3/mm3 Final    Monocytes, Absolute 12/24/2022 1.10 (H)  0.10 - 0.90 10*3/mm3 Final    Eosinophils, Absolute 12/24/2022 0.25  0.00 - 0.40 10*3/mm3 Final    Basophils, Absolute 12/24/2022 0.06  0.00 - 0.20 10*3/mm3 Final    Immature Grans, Absolute 12/24/2022 0.04  0.00 - 0.05 10*3/mm3 Final    nRBC 12/24/2022 0.0  0.0 - 0.2 /100 WBC Final    Extra Tube 12/24/2022 Hold for add-ons.   Final    Auto resulted.    Extra Tube 12/24/2022 hold for add-on   Final    Auto resulted    Extra Tube 12/24/2022 Hold for add-ons.   Final    Auto resulted.    Extra Tube 12/24/2022 Hold for add-ons.   Final    Auto resulted    Glucose 12/24/2022 140 (H)  70 - 99 mg/dL Final    Serial Number: [id number]  [id number]    Acetone 12/24/2022 Negative  Negative Final    Occult Blood, Fecal by Immunoassay 12/24/2022 Negative  Negative Final   Office Visit on 12/09/2022   Component Date Value Ref Range Status    Microalbumin, Urine 12/09/2022 2.4  mg/dL Final    Chlamydia trachomatis, REGGIE 12/09/2022 Negative  Negative Final    Gonococcus by REGGIE 12/09/2022 Negative  Negative Final    Trichomonas vaginosis 12/09/2022 Negative  Negative Final   Admission on 12/01/2022, Discharged on 12/01/2022   Component Date Value Ref Range Status    Glucose 11/30/2022 152 (H)  65 - 99 mg/dL Final    BUN 11/30/2022 17  6 - 20 mg/dL Final    Creatinine 11/30/2022 0.95  0.57 - 1.00 mg/dL Final    Sodium 11/30/2022 137  136 - 145 mmol/L Final    Potassium 11/30/2022 4.3  3.5 - 5.2 mmol/L Final    Slight hemolysis detected by analyzer. Results may be  affected.    Chloride 11/30/2022 101  98 - 107 mmol/L Final    CO2 11/30/2022 20.8 (L)  22.0 - 29.0 mmol/L Final    Calcium 11/30/2022 9.8  8.6 - 10.5 mg/dL Final    Total Protein 11/30/2022 7.5  6.0 - 8.5 g/dL Final    Albumin 11/30/2022 4.30  3.50 - 5.20 g/dL Final    ALT (SGPT) 11/30/2022 31  1 - 33 U/L Final    AST (SGOT) 11/30/2022 35 (H)  1 - 32 U/L Final    Alkaline Phosphatase 11/30/2022 123 (H)  39 - 117 U/L Final    Total Bilirubin 11/30/2022 <0.2  0.0 - 1.2 mg/dL Final    Globulin 11/30/2022 3.2  gm/dL Final    A/G Ratio 11/30/2022 1.3  g/dL Final    BUN/Creatinine Ratio 11/30/2022 17.9  7.0 - 25.0 Final    Anion Gap 11/30/2022 15.2 (H)  5.0 - 15.0 mmol/L Final    eGFR 11/30/2022 88.7  >60.0 mL/min/1.73 Final    National Kidney Foundation and American Society of Nephrology (ASN) Task Force recommended calculation based on the Chronic Kidney Disease Epidemiology Collaboration (CKD-EPI) equation refit without adjustment for race.    Lipase 11/30/2022 47  13 - 60 U/L Final    Color, UA 11/30/2022 Yellow  Yellow, Straw Final    Appearance, UA 11/30/2022 Clear  Clear Final    pH, UA 11/30/2022 7.0  5.0 - 8.0 Final    Specific Gravity, UA 11/30/2022 >1.030 (H)  1.005 - 1.030 Final    Glucose, UA 11/30/2022 500 mg/dL (2+) (A)  Negative Final    Ketones, UA 11/30/2022 Trace (A)  Negative Final    Bilirubin, UA 11/30/2022 Negative  Negative Final    Blood, UA 11/30/2022 Negative  Negative Final    Protein, UA 11/30/2022 Trace (A)  Negative Final    Leuk Esterase, UA 11/30/2022 Negative  Negative Final    Nitrite, UA 11/30/2022 Negative  Negative Final    Urobilinogen, UA 11/30/2022 1.0 E.U./dL  0.2 - 1.0 E.U./dL Final    HCG Quantitative 11/30/2022 <0.50  mIU/mL Final    Extra Tube 11/30/2022 Hold for add-ons.   Final    Auto resulted.    Extra Tube 11/30/2022 hold for add-on   Final    Auto resulted    Extra Tube 11/30/2022 Hold for add-ons.   Final    Auto resulted.    Extra Tube 11/30/2022 Hold for add-ons.    Final    Auto resulted    WBC 11/30/2022 13.94 (H)  3.40 - 10.80 10*3/mm3 Final    RBC 11/30/2022 5.07  3.77 - 5.28 10*6/mm3 Final    Hemoglobin 11/30/2022 14.5  12.0 - 15.9 g/dL Final    Hematocrit 11/30/2022 42.6  34.0 - 46.6 % Final    MCV 11/30/2022 84.0  79.0 - 97.0 fL Final    MCH 11/30/2022 28.6  26.6 - 33.0 pg Final    MCHC 11/30/2022 34.0  31.5 - 35.7 g/dL Final    RDW 11/30/2022 13.1  12.3 - 15.4 % Final    RDW-SD 11/30/2022 39.9  37.0 - 54.0 fl Final    MPV 11/30/2022 9.1  6.0 - 12.0 fL Final    Platelets 11/30/2022 408  140 - 450 10*3/mm3 Final    Neutrophil % 11/30/2022 49.9  42.7 - 76.0 % Final    Lymphocyte % 11/30/2022 40.7  19.6 - 45.3 % Final    Monocyte % 11/30/2022 7.0  5.0 - 12.0 % Final    Eosinophil % 11/30/2022 1.4  0.3 - 6.2 % Final    Basophil % 11/30/2022 0.6  0.0 - 1.5 % Final    Immature Grans % 11/30/2022 0.4  0.0 - 0.5 % Final    Neutrophils, Absolute 11/30/2022 6.95  1.70 - 7.00 10*3/mm3 Final    Lymphocytes, Absolute 11/30/2022 5.67 (H)  0.70 - 3.10 10*3/mm3 Final    Monocytes, Absolute 11/30/2022 0.98 (H)  0.10 - 0.90 10*3/mm3 Final    Eosinophils, Absolute 11/30/2022 0.20  0.00 - 0.40 10*3/mm3 Final    Basophils, Absolute 11/30/2022 0.09  0.00 - 0.20 10*3/mm3 Final    Immature Grans, Absolute 11/30/2022 0.05  0.00 - 0.05 10*3/mm3 Final    nRBC 11/30/2022 0.0  0.0 - 0.2 /100 WBC Final    Acetone 12/01/2022 Negative  Negative Final    QT Interval 12/01/2022 370  ms Final   Office Visit on 10/12/2022   Component Date Value Ref Range Status    Hepatitis C Ab 10/12/2022 <0.1  0.0 - 0.9 s/co ratio Final    TSH 10/12/2022 1.830  0.270 - 4.200 uIU/mL Final    Glucose 10/12/2022 391 (H)  65 - 99 mg/dL Final    BUN 10/12/2022 12  6 - 20 mg/dL Final    Creatinine 10/12/2022 0.80  0.57 - 1.00 mg/dL Final    Sodium 10/12/2022 134 (L)  136 - 145 mmol/L Final    Potassium 10/12/2022 4.8  3.5 - 5.2 mmol/L Final    Chloride 10/12/2022 98  98 - 107 mmol/L Final    CO2 10/12/2022 21.4 (L)  22.0 -  29.0 mmol/L Final    Calcium 10/12/2022 10.2  8.6 - 10.5 mg/dL Final    Total Protein 10/12/2022 7.3  6.0 - 8.5 g/dL Final    Albumin 10/12/2022 4.60  3.50 - 5.20 g/dL Final    ALT (SGPT) 10/12/2022 54 (H)  1 - 33 U/L Final    AST (SGOT) 10/12/2022 56 (H)  1 - 32 U/L Final    Alkaline Phosphatase 10/12/2022 185 (H)  39 - 117 U/L Final    Total Bilirubin 10/12/2022 0.2  0.0 - 1.2 mg/dL Final    Globulin 10/12/2022 2.7  gm/dL Final    A/G Ratio 10/12/2022 1.7  g/dL Final    BUN/Creatinine Ratio 10/12/2022 15.0  7.0 - 25.0 Final    Anion Gap 10/12/2022 14.6  5.0 - 15.0 mmol/L Final    eGFR 10/12/2022 109.0  >60.0 mL/min/1.73 Final    National Kidney Foundation and American Society of Nephrology (ASN) Task Force recommended calculation based on the Chronic Kidney Disease Epidemiology Collaboration (CKD-EPI) equation refit without adjustment for race.    Total Cholesterol 10/12/2022 187  0 - 200 mg/dL Final    Triglycerides 10/12/2022 394 (H)  0 - 150 mg/dL Final    HDL Cholesterol 10/12/2022 24 (L)  40 - 60 mg/dL Final    LDL Cholesterol  10/12/2022 97  0 - 100 mg/dL Final    VLDL Cholesterol 10/12/2022 66 (H)  5 - 40 mg/dL Final    LDL/HDL Ratio 10/12/2022 3.51   Final    HCG, Urine, QL 10/12/2022 Negative  Negative Final    Lot Number 10/12/2022 [id number]   Final    Internal Positive Control 10/12/2022 Passed  Positive, Passed Final    Internal Negative Control 10/12/2022 Passed  Negative, Passed Final    Expiration Date 10/12/2022 10/31/2023   Final    WBC 10/12/2022 7.96  3.40 - 10.80 10*3/mm3 Final    RBC 10/12/2022 5.44 (H)  3.77 - 5.28 10*6/mm3 Final    Hemoglobin 10/12/2022 15.1  12.0 - 15.9 g/dL Final    Hematocrit 10/12/2022 46.5  34.0 - 46.6 % Final    MCV 10/12/2022 85.5  79.0 - 97.0 fL Final    MCH 10/12/2022 27.8  26.6 - 33.0 pg Final    MCHC 10/12/2022 32.5  31.5 - 35.7 g/dL Final    RDW 10/12/2022 13.6  12.3 - 15.4 % Final    RDW-SD 10/12/2022 41.4  37.0 - 54.0 fl Final    MPV 10/12/2022 10.2  6.0 -  12.0 fL Final    Platelets 10/12/2022 304  140 - 450 10*3/mm3 Final    Neutrophil % 10/12/2022 54.9  42.7 - 76.0 % Final    Lymphocyte % 10/12/2022 36.1  19.6 - 45.3 % Final    Monocyte % 10/12/2022 5.9  5.0 - 12.0 % Final    Eosinophil % 10/12/2022 1.8  0.3 - 6.2 % Final    Basophil % 10/12/2022 0.8  0.0 - 1.5 % Final    Immature Grans % 10/12/2022 0.5  0.0 - 0.5 % Final    Neutrophils, Absolute 10/12/2022 4.38  1.70 - 7.00 10*3/mm3 Final    Lymphocytes, Absolute 10/12/2022 2.87  0.70 - 3.10 10*3/mm3 Final    Monocytes, Absolute 10/12/2022 0.47  0.10 - 0.90 10*3/mm3 Final    Eosinophils, Absolute 10/12/2022 0.14  0.00 - 0.40 10*3/mm3 Final    Basophils, Absolute 10/12/2022 0.06  0.00 - 0.20 10*3/mm3 Final    Immature Grans, Absolute 10/12/2022 0.04  0.00 - 0.05 10*3/mm3 Final    nRBC 10/12/2022 0.0  0.0 - 0.2 /100 WBC Final    Hemoglobin A1C 10/12/2022 10.00 (H)  4.80 - 5.60 % Final    Interpretation 10/12/2022 Comment   Final    Negative  Not infected with HCV, unless recent infection is suspected or other  evidence exists to indicate HCV infection.   Admission on 08/26/2022, Discharged on 08/26/2022   Component Date Value Ref Range Status    HCG, Urine QL 08/26/2022 Negative  Negative Final   There may be more visits with results that are not included.       EKG Results:  No orders to display       Imaging Results:  No Images in the past 120 days found..      Assessment & Plan   Diagnoses and all orders for this visit:    1. Generalized anxiety disorder (Primary)  -     hydrOXYzine (ATARAX) 10 MG tablet; Take 1-3 tab PO BID PRN anxiety  Dispense: 120 tablet; Refill: 1  -     escitalopram (Lexapro) 10 MG tablet; Take 1.5 tablets by mouth Daily for 30 days.  Dispense: 45 tablet; Refill: 1    2. Panic disorder  -     hydrOXYzine (ATARAX) 10 MG tablet; Take 1-3 tab PO BID PRN anxiety  Dispense: 120 tablet; Refill: 1  -     escitalopram (Lexapro) 10 MG tablet; Take 1.5 tablets by mouth Daily for 30 days.  Dispense: 45  tablet; Refill: 1    3. Post traumatic stress disorder (PTSD)  -     escitalopram (Lexapro) 10 MG tablet; Take 1.5 tablets by mouth Daily for 30 days.  Dispense: 45 tablet; Refill: 1    4. Severe episode of recurrent major depressive disorder, with psychotic features  -     escitalopram (Lexapro) 10 MG tablet; Take 1.5 tablets by mouth Daily for 30 days.  Dispense: 45 tablet; Refill: 1      Patient screened positive for depression based on a PHQ-9 score of 18 on 7/24/2023. Follow-up recommendations include: Prescribed antidepressant medication treatment, Suicide Risk Assessment performed, and see assessment below .    Presentation seems most consistent with FLAQUITA, MDD, PTSD, panic disorder.  Counseled the patient on the need to continue being compliant with prescribed meds.  We will increase Lexapro for management depression, anxiety, and overall mood.  Consider augmenting with mood stabilizer/antipsychotic if appropriate at next visit.  Patient declines this medication at this time. Pt does want to try a med as needed for anxiety. We will start on hydroxyzine for anxiety as needed.  Patient declines referral for psychotherapy.  Follow-up in 1 month.  Instructed patient to contact the office for any new or worsening symptoms or any other concerns.  Addressed all questions and concerns.    Extensively counseled the patient on the need to be compliant with prescribed medications.  We will start patient on Lexapro for management depression, anxiety, and overall mood.  Consider augmenting with mood stabilizer/antipsychotic if appropriate by next visit.  We will be starting 1 medication at a time as patient has been noncompliant in the past.  Patient claims referral for therapy.  She declines medication for sleep at this time.  Follow-up in 1 month.  Addressed all questions and concerns.    Visit Diagnoses:    ICD-10-CM ICD-9-CM   1. Generalized anxiety disorder  F41.1 300.02   2. Panic disorder  F41.0 300.01   3. Post  traumatic stress disorder (PTSD)  F43.10 309.81   4. Severe episode of recurrent major depressive disorder, with psychotic features  F33.3 296.34       PLAN:  Safety: No acute safety concerns at this time.  Therapy: Patient declines referral for therapy.  Risk Assessment: Risk of self-harm acutely is moderate to severe.  Risk factors include anxiety disorder, mood disorder, family history, h/o self harm in the past, and recent psychosocial stressors (pandemic). Protective factors include denies access to guns/weapons, no present SI, no history of suicide attempts in the past, minimal AODA, healthcare seeking, future orientation, willingness to engage in care.  Risk of self-harm chronically is also moderate to severe, but could be further elevated in the event of treatment noncompliance and/or AODA.  Labs/Diagnostics Ordered:   No orders of the defined types were placed in this encounter.    Medications:   New Medications Ordered This Visit   Medications    hydrOXYzine (ATARAX) 10 MG tablet     Sig: Take 1-3 tab PO BID PRN anxiety     Dispense:  120 tablet     Refill:  1    escitalopram (Lexapro) 10 MG tablet     Sig: Take 1.5 tablets by mouth Daily for 30 days.     Dispense:  45 tablet     Refill:  1       Discussed all risks, benefits, alternatives, and side effects of Escitalopram including but not limited to GI upset, sexual dysfunction, bleeding risk, seizure risk, insomnia, sedation, headache, activation of ron or hypomania, increased fragility fracture risk, hyponatremia, ocular effects, dose-dependent prolonged QT interval, withdrawal syndrome following abrupt discontinuation, serotonin syndrome, and activation of suicidal ideation and behavior.  Pt educated on the need to practice safe sex while taking this med. Discussed the need for pt to immediately call the office for any new or worsening symptoms, such as worsening depression; feeling nervous or restless; suicidal thoughts or actions; or other changes  changes in mood or behavior, and all other concerns. Pt educated on med compliance and the risks of suddenly stopping this medication or missing doses. Pt verbalized understanding and is agreeable to taking Escitalopram. Addressed all questions and concerns.     Discussed all risks, benefits, alternatives, and side effects of Hydroxyzine including but not limited to dry mouth, sedation, tremor, respiratory depression, acute generalized exanthematous pustulosis, CNS depression, drowsiness, cognitive dysfunction, dizziness, falls risk, prolonged QT interval, torsades de pointes, hallucination, involuntary body movements, seizure, and headache. Pt denies known h/o prolonged QT interval. Pt educated on the need to practice safe sex while taking this med. Discussed the need for pt to immediately call the office for any new or worsening symptoms, such as changes changes in mood or behavior, and all other concerns. Pt verbalized understanding and is agreeable to taking Hydroxyzine. Addressed all questions and concerns.     Follow up: F/u in 1 month.      TREATMENT PLAN/GOALS: Continue supportive psychotherapy efforts and medications as indicated. Treatment and medication options discussed during today's visit. Patient ackowledged and verbally consented to continue with current treatment plan and was educated on the importance of compliance with treatment and follow-up appointments.    MEDICATION ISSUES:  ROSE reviewed as expected.  Discussed medication options and treatment plan of prescribed medication as well as the risks, benefits, and side effects including potential falls, possible impaired driving and metabolic adversities among others. Patient is agreeable to call the office with any worsening of symptoms or onset of side effects. Patient is agreeable to call 911 or go to the nearest ER should he/she begin having SI/HI. No medication side effects or related complaints today.            This document has been  electronically signed by Catie Murillo PA-C  August 14, 2023 16:12 EDT      Part of this note may be an electronic transcription/translation of spoken language to printed text using the Dragon Dictation System.

## 2023-08-22 ENCOUNTER — OFFICE VISIT (OUTPATIENT)
Dept: INTERNAL MEDICINE | Facility: CLINIC | Age: 18
End: 2023-08-22
Payer: COMMERCIAL

## 2023-08-22 ENCOUNTER — APPOINTMENT (OUTPATIENT)
Dept: CT IMAGING | Facility: HOSPITAL | Age: 18
End: 2023-08-22
Payer: COMMERCIAL

## 2023-08-22 ENCOUNTER — HOSPITAL ENCOUNTER (EMERGENCY)
Facility: HOSPITAL | Age: 18
Discharge: HOME | End: 2023-08-23
Attending: EMERGENCY MEDICINE
Payer: COMMERCIAL

## 2023-08-22 VITALS
RESPIRATION RATE: 18 BRPM | TEMPERATURE: 98.5 F | OXYGEN SATURATION: 99 % | HEIGHT: 62 IN | SYSTOLIC BLOOD PRESSURE: 110 MMHG | HEART RATE: 89 BPM | BODY MASS INDEX: 30.47 KG/M2 | WEIGHT: 165.57 LBS | DIASTOLIC BLOOD PRESSURE: 72 MMHG

## 2023-08-22 VITALS
BODY MASS INDEX: 30.73 KG/M2 | HEIGHT: 62 IN | SYSTOLIC BLOOD PRESSURE: 132 MMHG | TEMPERATURE: 97.2 F | RESPIRATION RATE: 18 BRPM | OXYGEN SATURATION: 96 % | DIASTOLIC BLOOD PRESSURE: 89 MMHG | HEART RATE: 99 BPM | WEIGHT: 167 LBS

## 2023-08-22 DIAGNOSIS — F33.0 MAJOR DEPRESSIVE DISORDER, RECURRENT EPISODE, MILD DEGREE: ICD-10-CM

## 2023-08-22 DIAGNOSIS — K60.2 ANAL FISSURE: ICD-10-CM

## 2023-08-22 DIAGNOSIS — K62.5 RECTAL BLEED: ICD-10-CM

## 2023-08-22 DIAGNOSIS — E11.65 TYPE 2 DIABETES MELLITUS WITH HYPERGLYCEMIA, WITHOUT LONG-TERM CURRENT USE OF INSULIN: Chronic | ICD-10-CM

## 2023-08-22 DIAGNOSIS — Z91.199 NONCOMPLIANCE: Primary | ICD-10-CM

## 2023-08-22 DIAGNOSIS — R10.32 LEFT LOWER QUADRANT ABDOMINAL PAIN: Primary | ICD-10-CM

## 2023-08-22 DIAGNOSIS — F41.9 ANXIETY: ICD-10-CM

## 2023-08-22 DIAGNOSIS — I10 HYPERTENSION, UNSPECIFIED TYPE: ICD-10-CM

## 2023-08-22 DIAGNOSIS — R55 SYNCOPE, UNSPECIFIED SYNCOPE TYPE: ICD-10-CM

## 2023-08-22 DIAGNOSIS — E78.2 MIXED HYPERLIPIDEMIA: ICD-10-CM

## 2023-08-22 LAB
ABO GROUP BLD: NORMAL
ABO GROUP BLD: NORMAL
ALBUMIN SERPL-MCNC: 4.5 G/DL (ref 3.5–5.2)
ALBUMIN/GLOB SERPL: 1.3 G/DL
ALP SERPL-CCNC: 131 U/L (ref 39–117)
ALT SERPL W P-5'-P-CCNC: 61 U/L (ref 1–33)
ANION GAP SERPL CALCULATED.3IONS-SCNC: 14.6 MMOL/L (ref 5–15)
AST SERPL-CCNC: 96 U/L (ref 1–32)
BASOPHILS # BLD AUTO: 0.04 10*3/MM3 (ref 0–0.2)
BASOPHILS NFR BLD AUTO: 0.4 % (ref 0–1.5)
BILIRUB SERPL-MCNC: 0.2 MG/DL (ref 0–1.2)
BILIRUB UR QL STRIP: NEGATIVE
BLD GP AB SCN SERPL QL: NEGATIVE
BUN SERPL-MCNC: 17 MG/DL (ref 6–20)
BUN/CREAT SERPL: 17.5 (ref 7–25)
CALCIUM SPEC-SCNC: 9.8 MG/DL (ref 8.6–10.5)
CHLORIDE SERPL-SCNC: 97 MMOL/L (ref 98–107)
CLARITY UR: CLEAR
CO2 SERPL-SCNC: 22.4 MMOL/L (ref 22–29)
COLOR UR: YELLOW
CREAT SERPL-MCNC: 0.97 MG/DL (ref 0.57–1)
DEPRECATED RDW RBC AUTO: 39.8 FL (ref 37–54)
EGFRCR SERPLBLD CKD-EPI 2021: 86 ML/MIN/1.73
EOSINOPHIL # BLD AUTO: 0.13 10*3/MM3 (ref 0–0.4)
EOSINOPHIL NFR BLD AUTO: 1.2 % (ref 0.3–6.2)
ERYTHROCYTE [DISTWIDTH] IN BLOOD BY AUTOMATED COUNT: 12.3 % (ref 12.3–15.4)
GLOBULIN UR ELPH-MCNC: 3.4 GM/DL
GLUCOSE SERPL-MCNC: 326 MG/DL (ref 65–99)
GLUCOSE UR STRIP-MCNC: ABNORMAL MG/DL
HCG INTACT+B SERPL-ACNC: <0.5 MIU/ML
HCT VFR BLD AUTO: 45.2 % (ref 34–46.6)
HEMOCCULT STL QL IA: POSITIVE
HGB BLD-MCNC: 14.7 G/DL (ref 12–15.9)
HGB UR QL STRIP.AUTO: NEGATIVE
HOLD SPECIMEN: NORMAL
HOLD SPECIMEN: NORMAL
IMM GRANULOCYTES # BLD AUTO: 0.04 10*3/MM3 (ref 0–0.05)
IMM GRANULOCYTES NFR BLD AUTO: 0.4 % (ref 0–0.5)
KETONES UR QL STRIP: NEGATIVE
LEUKOCYTE ESTERASE UR QL STRIP.AUTO: NEGATIVE
LIPASE SERPL-CCNC: 46 U/L (ref 13–60)
LYMPHOCYTES # BLD AUTO: 2.95 10*3/MM3 (ref 0.7–3.1)
LYMPHOCYTES NFR BLD AUTO: 27.5 % (ref 19.6–45.3)
MCH RBC QN AUTO: 28.7 PG (ref 26.6–33)
MCHC RBC AUTO-ENTMCNC: 32.5 G/DL (ref 31.5–35.7)
MCV RBC AUTO: 88.1 FL (ref 79–97)
MONOCYTES # BLD AUTO: 0.75 10*3/MM3 (ref 0.1–0.9)
MONOCYTES NFR BLD AUTO: 7 % (ref 5–12)
NEUTROPHILS NFR BLD AUTO: 6.83 10*3/MM3 (ref 1.7–7)
NEUTROPHILS NFR BLD AUTO: 63.5 % (ref 42.7–76)
NITRITE UR QL STRIP: NEGATIVE
NRBC BLD AUTO-RTO: 0 /100 WBC (ref 0–0.2)
PH UR STRIP.AUTO: 6.5 [PH] (ref 5–8)
PLATELET # BLD AUTO: 348 10*3/MM3 (ref 140–450)
PMV BLD AUTO: 9 FL (ref 6–12)
POTASSIUM SERPL-SCNC: 4.4 MMOL/L (ref 3.5–5.2)
PROT SERPL-MCNC: 7.9 G/DL (ref 6–8.5)
PROT UR QL STRIP: NEGATIVE
RBC # BLD AUTO: 5.13 10*6/MM3 (ref 3.77–5.28)
RH BLD: POSITIVE
RH BLD: POSITIVE
SODIUM SERPL-SCNC: 134 MMOL/L (ref 136–145)
SP GR UR STRIP: >1.03 (ref 1–1.03)
T&S EXPIRATION DATE: NORMAL
UROBILINOGEN UR QL STRIP: ABNORMAL
WBC NRBC COR # BLD: 10.74 10*3/MM3 (ref 3.4–10.8)
WHOLE BLOOD HOLD COAG: NORMAL
WHOLE BLOOD HOLD SPECIMEN: NORMAL

## 2023-08-22 PROCEDURE — 84702 CHORIONIC GONADOTROPIN TEST: CPT

## 2023-08-22 PROCEDURE — 85025 COMPLETE CBC W/AUTO DIFF WBC: CPT

## 2023-08-22 PROCEDURE — 80053 COMPREHEN METABOLIC PANEL: CPT

## 2023-08-22 PROCEDURE — 86900 BLOOD TYPING SEROLOGIC ABO: CPT

## 2023-08-22 PROCEDURE — 25510000001 IOPAMIDOL PER 1 ML: Performed by: EMERGENCY MEDICINE

## 2023-08-22 PROCEDURE — 83690 ASSAY OF LIPASE: CPT

## 2023-08-22 PROCEDURE — 81003 URINALYSIS AUTO W/O SCOPE: CPT

## 2023-08-22 PROCEDURE — 99285 EMERGENCY DEPT VISIT HI MDM: CPT

## 2023-08-22 PROCEDURE — 36415 COLL VENOUS BLD VENIPUNCTURE: CPT

## 2023-08-22 PROCEDURE — 74177 CT ABD & PELVIS W/CONTRAST: CPT

## 2023-08-22 PROCEDURE — 82274 ASSAY TEST FOR BLOOD FECAL: CPT | Performed by: EMERGENCY MEDICINE

## 2023-08-22 PROCEDURE — 86850 RBC ANTIBODY SCREEN: CPT

## 2023-08-22 PROCEDURE — 86901 BLOOD TYPING SEROLOGIC RH(D): CPT

## 2023-08-22 RX ORDER — ONDANSETRON 4 MG/1
4 TABLET, ORALLY DISINTEGRATING ORAL EVERY 4 HOURS PRN
Qty: 15 TABLET | Refills: 0 | Status: SHIPPED | OUTPATIENT
Start: 2023-08-22

## 2023-08-22 RX ORDER — MECLIZINE HYDROCHLORIDE 25 MG/1
25 TABLET ORAL 3 TIMES DAILY PRN
Qty: 10 TABLET | Refills: 0 | Status: SHIPPED | OUTPATIENT
Start: 2023-08-22

## 2023-08-22 RX ORDER — SODIUM CHLORIDE 0.9 % (FLUSH) 0.9 %
10 SYRINGE (ML) INJECTION AS NEEDED
Status: DISCONTINUED | OUTPATIENT
Start: 2023-08-22 | End: 2023-08-23 | Stop reason: HOSPADM

## 2023-08-22 RX ORDER — PROCHLORPERAZINE 25 MG/1
1 SUPPOSITORY RECTAL TAKE AS DIRECTED
Qty: 2 EACH | Refills: 3 | Status: SHIPPED | OUTPATIENT
Start: 2023-08-22

## 2023-08-22 RX ADMIN — IOPAMIDOL 100 ML: 755 INJECTION, SOLUTION INTRAVENOUS at 23:57

## 2023-08-22 NOTE — PROGRESS NOTES
Chief Complaint  Diabetes and Syncope (Pt states she has been passing out lately and not sure why )    Subjective          Amarilis Vinson presents to [facility]  History of Present Illness    Reports recurrent syncope unrelated to blood glucose or BP.     Diabetes  She presents for her follow-up diabetic visit. She has type 2 diabetes mellitus. Her disease course has been improving. Hypoglycemia symptoms include dizziness. (Checks glucose during these times and she is not hypoglycemic ) Associated symptoms include fatigue. There are no hypoglycemic complications. There are no diabetic complications. Risk factors for coronary artery disease include family history, obesity, hypertension, sedentary lifestyle and stress. Current diabetic treatments: unable to tolerate metformin. She is compliant with treatment all of the time. Her weight is stable. She is following a diabetic diet. When asked about meal planning, she reported none. She never participates in exercise. Her home blood glucose trend is decreasing steadily. Her overall blood glucose range is 140-180 mg/dl. An ACE inhibitor/angiotensin II receptor blocker is being taken. She does not see a podiatrist.Eye exam is not current.   Patient reports that she has not been taking her Jardiance because she lost it about a month ago and she did not feel like it was helping her    Depression  Visit Type: follow up   Progression since onset: improving   Patient presents with the following symptoms: decreased concentration, depressed mood, excessive worry, fatigue, irritability, nervousness/anxiety and suicidal ideas.  Patient is not experiencing: anhedonia, chest pain, choking sensation, compulsions, confusion, dizziness, dry mouth, feelings of hopelessness, feelings of worthlessness, hypersomnia, hyperventilation, impotence, insomnia, malaise, memory impairment, muscle tension, nausea, obsessions, palpitations, panic,  psychomotor agitation, psychomotor retardation, restlessness, shortness of breath, suicidal planning, thoughts of death and weight gain.  Severity: mild  States that she lost her Zoloft and her move and abruptly stopped taking it.    Hypertension  This is a chronic problem. Associated symptoms include headaches. Pertinent negatives include no anxiety, blurred vision, chest pain, malaise/fatigue, neck pain, orthopnea, palpitations, peripheral edema, PND, shortness of breath or sweats. Risk factors for coronary artery disease include obesity. Current antihypertension treatment includes ACE inhibitors. The current treatment provides mild improvement. Compliance problems include diet and exercise.    Has been running 120's at home.   Patient is noncompliant with lisinopril and has not been taking it.  Blood pressure in clinic today markedly elevated    Anxiety  Presents for follow-up  visit. Symptoms include depressed mood, excessive worry, irritability and nervous/anxious behavior. Patient reports no chest pain, compulsions, confusion, decreased concentration, dizziness, dry mouth, feeling of choking, hyperventilation, impotence, insomnia, malaise, muscle tension, nausea, obsessions, palpitations, panic, restlessness, shortness of breath or suicidal ideas. The quality of sleep is fair.       Current Outpatient Medications   Medication Instructions    aspirin-acetaminophen-caffeine (EXCEDRIN MIGRAINE) 250-250-65 MG per tablet Oral    atorvastatin (LIPITOR) 20 mg, Oral, Nightly    Continuous Blood Gluc  (Dexcom G6 ) device 1 each, Does not apply, Take As Directed    Continuous Blood Gluc Sensor (Dexcom G6 Sensor) Does not apply, Every 10 Days    Continuous Blood Gluc Transmit (Dexcom G6 Transmitter) misc 1 each, Does not apply, Take As Directed    escitalopram (LEXAPRO) 15 mg, Oral, Daily    glucose blood test strip Use three times per day as needed    glucose monitoring kit (FREESTYLE) monitoring kit 1  "each, Does not apply, 3 Times Daily PRN    hydroCHLOROthiazide (HYDRODIURIL) 25 mg, Oral, Daily    hydrOXYzine (ATARAX) 10 MG tablet Take 1-3 tab PO BID PRN anxiety    ibuprofen (ADVIL,MOTRIN) 200 MG tablet 2 tablets by mouth prn    Lancets (freestyle) lancets Use three times per day as needed to check blood sugars    lisinopril (PRINIVIL,ZESTRIL) 40 mg, Oral, Daily    Lo Loestrin Fe 1 MG-10 MCG / 10 MCG tablet 1 tablet, Oral, Daily    meclizine (ANTIVERT) 25 mg, Oral, 3 Times Daily PRN    ondansetron ODT (ZOFRAN-ODT) 4 mg, Translingual, Every 4 Hours PRN    Rimegepant Sulfate (NURTEC) 75 mg, Oral, Every 48 Hours PRN       The following portions of the patient's history were reviewed and updated as appropriate: allergies, current medications, past family history, past medical history, past social history, past surgical history, and problem list.    Objective   Vital Signs:   /89 (BP Location: Right arm, Patient Position: Sitting, Cuff Size: Adult)   Pulse 99   Temp 97.2 øF (36.2 øC) (Temporal)   Resp 18   Ht 157.5 cm (62\")   Wt 75.8 kg (167 lb)   SpO2 96%   BMI 30.54 kg/mý     BP Readings from Last 3 Encounters:   08/22/23 132/89   08/03/23 145/99   07/24/23 (!) 166/122     Wt Readings from Last 3 Encounters:   08/22/23 75.8 kg (167 lb)   08/03/23 74.9 kg (165 lb 2 oz)   07/24/23 74.8 kg (165 lb)           Physical Exam  Constitutional:       Appearance: She is obese.        Appearance: No acute distress, well-nourished  Head: normocephalic, atraumatic  Eyes: extraocular movements intact, no scleral icterus, no conjunctival injection  Ears, Nose, and Throat: external ears normal, nares patent, moist mucous membranes  Cardiovascular: regular rate and rhythm. no murmurs, rubs, or gallops. no edema  Respiratory: breathing comfortably, symmetric chest rise, clear to auscultation bilaterally. No wheezes, rales, or rhonchi.  Neuro: alert and oriented to time, place, and person. Normal gait  Psych: normal mood " and affect     Result Review :   The following data was reviewed by: JOSE Lechuga on 07/19/2023:  Common labs          3/21/2023    21:30 7/19/2023    16:21 8/3/2023    23:58   Common Labs   Glucose 119  102  248    BUN 13  11  17    Creatinine 0.90  0.86  0.96    Sodium 136  140  136    Potassium 4.0  4.2  4.2    Chloride 101  103  101    Calcium 9.4  10.0  9.7    Albumin 4.3  4.7  4.3    Total Bilirubin 0.3  0.2  <0.2    Alkaline Phosphatase 105  110  125    AST (SGOT) 213  102  74    ALT (SGPT) 84  62  60    WBC 10.65  9.42  11.42    Hemoglobin 15.9  16.3  14.8    Hematocrit 44.4  47.4  41.9    Platelets 307  312  333    Total Cholesterol  197     Triglycerides  287     HDL Cholesterol  29     LDL Cholesterol   118     Hemoglobin A1C  6.70            Lab Results   Component Value Date    SARSANTIGEN Not Detected 01/18/2023    COVID19 NOT DETECTED 04/05/2021    RAPFLUA Negative 01/18/2023    RAPFLUB Negative 01/18/2023    POCPREGUR Negative 10/12/2022    BILIRUBINUR Negative 08/04/2023       Procedures        Assessment and Plan    Diagnoses and all orders for this visit:    1. Noncompliance (Primary)    2. Hypertension, unspecified type  -     Ambulatory Referral to Cardiology    3. Major depressive disorder, recurrent episode, mild degree    4. Anxiety    5. Type 2 diabetes mellitus with hyperglycemia, without long-term current use of insulin  Comments:  adding jardiance to regimen as pt was unable to tolerate metformin   Orders:  -     Continuous Blood Gluc Transmit (Dexcom G6 Transmitter) misc; 1 each Take As Directed.  Dispense: 2 each; Refill: 3    6. Mixed hyperlipidemia    7. Syncope, unspecified syncope type  Comments:  POTS? migraines? will send to cards to r/o  Orders:  -     Ambulatory Referral to Cardiology    Other orders  -     ondansetron ODT (ZOFRAN-ODT) 4 MG disintegrating tablet; Place 1 tablet on the tongue Every 4 (Four) Hours As Needed for Nausea or Vomiting.  Dispense: 15  tablet; Refill: 0  -     meclizine (ANTIVERT) 25 MG tablet; Take 1 tablet by mouth 3 (Three) Times a Day As Needed for Dizziness.  Dispense: 10 tablet; Refill: 0          Medications Discontinued During This Encounter   Medication Reason    Continuous Blood Gluc Transmit (Dexcom G6 Transmitter) misc Reorder    ondansetron ODT (ZOFRAN-ODT) 4 MG disintegrating tablet Reorder    meclizine (ANTIVERT) 25 MG tablet Reorder            Follow Up   Return in about 8 weeks (around 10/17/2023) for Hypertension, Diabetes, Hyperlipidemia.  Patient was given instructions and counseling regarding her condition or for health maintenance advice. Please see specific information pulled into the AVS if appropriate.       Jasmin Vickers, JOSE  08/22/23  12:30 EDT

## 2023-08-22 NOTE — PATIENT INSTRUCTIONS
Foods rich in unsaturated fats: e.g. olive and canola oil, avocados, almonds, pecans, walnuts  Low carbohydrate and fiber rich foods: e.g. whole grains, whole wheat bread and pasta, oatmeal, oat bran, brown rice, barley, legumes  Low carbohydrate and fibre rich foods: e.g. whole grains, whole wheat bread and pasta, oatmeal, oat bran, brown rice, barley, legumes  Fatty fish: e.g. salmon, herring  Phytosterols and stanols: e.g. nuts, seeds, vegetable oils, fortified food products such as orange juice, yogurt, salad dressing  Foods to avoid:    High cholesterol foods: e.g. egg yolks, whole milk products, and organ meats  Canola oil and Other processed vegetable oils  Potato chips and Other packaged Foods  Sue and Other processed meats

## 2023-08-23 NOTE — ED PROVIDER NOTES
Time: 11:24 PM EDT  Date of encounter:  8/22/2023  Independent Historian/Clinical History and Information was obtained by:   Patient    History is limited by: N/A    Chief Complaint: Blood in stool      History of Present Illness:  Patient is a 18 y.o. year old female who presents to the emergency department for evaluation of Blood in stool.  Patient describes bright red blood that she noted in the toilet when she had a bowel movement.  She states she also noticed blood when she wiped.  She denies any history of GI bleed.  She also reports left lower quadrant abdominal pain.  No nausea or vomiting.  No diarrhea.  No fever or chills.    HPI    Patient Care Team  Primary Care Provider: Jasmin Vickers APRN    Past Medical History:     No Known Allergies  Past Medical History:   Diagnosis Date    Anxiety     COVID 08/07/2022    NO CURRENT SYMPTOMS    Diabetes mellitus     Foot pain, left     Hypertension     Migraines      Past Surgical History:   Procedure Laterality Date    FOOT SURGERY      DISTAL LEFT FIRST METATARSAL    HARDWARE REMOVAL Left 8/26/2022    Procedure: HARDWARE REMOVAL;  left first metatarsal shaft;  Surgeon: Micehl Jaffe DPM;  Location: [facility];  Service: Podiatry;  Laterality: Left;    ULNAR OSTEOPLASTY  2018     Family History   Adopted: Yes       Home Medications:  Prior to Admission medications    Medication Sig Start Date End Date Taking? Authorizing Provider   aspirin-acetaminophen-caffeine (EXCEDRIN MIGRAINE) 250-250-65 MG per tablet Take  by mouth.    Provider, MD Edison   atorvastatin (LIPITOR) 20 MG tablet Take 1 tablet by mouth Every Night. 7/21/23   Jasmin Vickers APRN   Continuous Blood Gluc  (Dexcom G6 ) device 1 each Take As Directed. 1/20/23   Jasmin Vickers APRN   Continuous Blood Gluc Sensor (Dexcom G6 Sensor) Every 10 (Ten) Days. 6/22/23   Jasmin Vickers APRN   Continuous Blood Gluc Transmit (Dexcom G6 Transmitter)  misc 1 each Take As Directed. 8/22/23   Jasmin Vickers APRN   escitalopram (Lexapro) 10 MG tablet Take 1.5 tablets by mouth Daily for 30 days. 8/14/23 9/13/23  Catie Murillo PA-C   glucose blood test strip Use three times per day as needed 12/9/22   Jasmin Vickers APRN   glucose monitoring kit (FREESTYLE) monitoring kit 1 each 3 (Three) Times a Day As Needed (Blood Sugar Check). 10/18/22 10/18/23  Jasmin Vickers APRN   hydroCHLOROthiazide (HYDRODIURIL) 25 MG tablet Take 1 tablet by mouth Daily. 7/19/23   Jasmin Vickers APRN   hydrOXYzine (ATARAX) 10 MG tablet Take 1-3 tab PO BID PRN anxiety 8/14/23   Catie Murillo PA-C   ibuprofen (ADVIL,MOTRIN) 200 MG tablet 2 tablets by mouth prn  Patient not taking: Reported on 8/22/2023    ProviderEdison MD   Lancets (freestyle) lancets Use three times per day as needed to check blood sugars 10/18/22   Jasmin Vickers APRN   lisinopril (PRINIVIL,ZESTRIL) 40 MG tablet Take 1 tablet by mouth Daily. 7/19/23   Jasmin Vickers APRN   Lo Loestrin Fe 1 MG-10 MCG / 10 MCG tablet Take 1 tablet by mouth Daily. 12/30/22   Edison Aaron MD   meclizine (ANTIVERT) 25 MG tablet Take 1 tablet by mouth 3 (Three) Times a Day As Needed for Dizziness. 8/22/23   Jasmin Vickers APRN   ondansetron ODT (ZOFRAN-ODT) 4 MG disintegrating tablet Place 1 tablet on the tongue Every 4 (Four) Hours As Needed for Nausea or Vomiting. 8/22/23   Jasmin Vickers APRN   Rimegepant Sulfate (NURTEC) 75 MG tablet dispersible tablet Take 1 tablet by mouth Every Other Day As Needed (migraine). 7/19/23   Jasmin Vickers APRN   Continuous Blood Gluc Transmit (Dexcom G6 Transmitter) misc 1 each Take As Directed. 1/20/23 8/22/23  Jasmin Vickers APRN   meclizine (ANTIVERT) 25 MG tablet Take 1 tablet by mouth 3 (Three) Times a Day As Needed for Dizziness. 8/4/23 8/22/23  Judie Underwood APRN   ondansetron ODT (ZOFRAN-ODT) 4 MG disintegrating  "tablet Place 1 tablet on the tongue Every 4 (Four) Hours As Needed for Nausea or Vomiting. 3/22/23 8/22/23  Suzi Nicolas APRN        Social History:   Social History     Tobacco Use    Smoking status: Never    Smokeless tobacco: Never   Vaping Use    Vaping Use: Never used   Substance Use Topics    Alcohol use: Not Currently    Drug use: Not Currently         Review of Systems:  Review of Systems   Constitutional:  Negative for chills and fever.   HENT:  Negative for congestion, ear pain and sore throat.    Eyes:  Negative for pain.   Respiratory:  Negative for cough, chest tightness and shortness of breath.    Cardiovascular:  Negative for chest pain.   Gastrointestinal:  Positive for abdominal pain and blood in stool. Negative for diarrhea, nausea and vomiting.   Genitourinary:  Negative for flank pain and hematuria.   Musculoskeletal:  Negative for joint swelling.   Skin:  Negative for pallor.   Neurological:  Negative for seizures and headaches.   All other systems reviewed and are negative.     Physical Exam:  /72   Pulse 89   Temp 98.5 øF (36.9 øC) (Oral)   Resp 18   Ht 157.5 cm (62\")   Wt 75.1 kg (165 lb 9.1 oz)   LMP 08/08/2023 (Approximate)   SpO2 99%   BMI 30.28 kg/mý     Physical Exam  Constitutional:       Appearance: Normal appearance.   HENT:      Head: Normocephalic and atraumatic.      Nose: Nose normal.      Mouth/Throat:      Mouth: Mucous membranes are moist.   Eyes:      Extraocular Movements: Extraocular movements intact.      Conjunctiva/sclera: Conjunctivae normal.      Pupils: Pupils are equal, round, and reactive to light.   Cardiovascular:      Rate and Rhythm: Normal rate and regular rhythm.      Pulses: Normal pulses.      Heart sounds: Normal heart sounds.   Pulmonary:      Effort: Pulmonary effort is normal.      Breath sounds: Normal breath sounds.   Abdominal:      General: There is no distension.      Palpations: Abdomen is soft.      Tenderness: There is abdominal " tenderness (Mild left lower abdomen).   Genitourinary:     Rectum: Anal fissure present.      Comments: Small anal fissure no active bleeding.  Musculoskeletal:         General: Normal range of motion.      Cervical back: Normal range of motion.   Skin:     General: Skin is warm and dry.      Capillary Refill: Capillary refill takes less than 2 seconds.   Neurological:      General: No focal deficit present.      Mental Status: She is alert and oriented to person, place, and time. Mental status is at baseline.   Psychiatric:         Mood and Affect: Mood normal.         Behavior: Behavior normal.                Procedures:  Procedures      Medical Decision Making:      Comorbidities that affect care:    Diabetes, Hypertension    External Notes reviewed:    Previous Clinic Note: Patient seen by her PCP on 8/22/2023 for diabetes and syncope.      The following orders were placed and all results were independently analyzed by me:  Orders Placed This Encounter   Procedures    CT Abdomen Pelvis With Contrast    [place] Draw    Comprehensive Metabolic Panel    Lipase    Urinalysis With Microscopic If Indicated (No Culture) - Urine, Clean Catch    hCG, Quantitative, Pregnancy    CBC Auto Differential    Occult Blood, Fecal By Immunoassay - Stool, Per Rectum    NPO Diet NPO Type: Strict NPO    Undress & Gown    Measure Blood Pressure    Vital Signs    Orthostatic Blood Pressure    Undress & Gown    Pulse Oximetry    Oxygen Therapy- Nasal Cannula; Titrate 1-6 LPM Per SpO2; 90 - 95%    POC Occult Blood Stool    Type & Screen    ABO RH Specimen Verification    Insert Peripheral IV    Insert Peripheral IV    CBC & Differential    Green Top (Gel)    Lavender Top    Gold Top - SST    Light Blue Top       Medications Given in the Emergency Department:  Medications   sodium chloride 0.9 % flush 10 mL (has no administration in time range)   sodium chloride 0.9 % flush 10 mL (has no administration in time range)        ED Course:          Labs:    Lab Results (last 24 hours)       Procedure Component Value Units Date/Time    CBC & Differential [[id number]]  (Normal) Collected: 08/22/23 1651    Specimen: Blood Updated: 08/22/23 1724    Narrative:      The following orders were created for panel order CBC & Differential.  Procedure                               Abnormality         Status                     ---------                               -----------         ------                     CBC Auto Differential[[id number]]        Normal              Final result                 Please view results for these tests on the individual orders.    Comprehensive Metabolic Panel [[id number]]  (Abnormal) Collected: 08/22/23 1651    Specimen: Blood Updated: 08/22/23 1745     Glucose 326 mg/dL      BUN 17 mg/dL      Creatinine 0.97 mg/dL      Sodium 134 mmol/L      Potassium 4.4 mmol/L      Chloride 97 mmol/L      CO2 22.4 mmol/L      Calcium 9.8 mg/dL      Total Protein 7.9 g/dL      Albumin 4.5 g/dL      ALT (SGPT) 61 U/L      AST (SGOT) 96 U/L      Alkaline Phosphatase 131 U/L      Total Bilirubin 0.2 mg/dL      Globulin 3.4 gm/dL      A/G Ratio 1.3 g/dL      BUN/Creatinine Ratio 17.5     Anion Gap 14.6 mmol/L      eGFR 86.0 mL/min/1.73     Narrative:      GFR Normal >60  Chronic Kidney Disease <60  Kidney Failure <15      CBC Auto Differential [[id number]]  (Normal) Collected: 08/22/23 1651    Specimen: Blood Updated: 08/22/23 1724     WBC 10.74 10*3/mm3      RBC 5.13 10*6/mm3      Hemoglobin 14.7 g/dL      Hematocrit 45.2 %      MCV 88.1 fL      MCH 28.7 pg      MCHC 32.5 g/dL      RDW 12.3 %      RDW-SD 39.8 fl      MPV 9.0 fL      Platelets 348 10*3/mm3      Neutrophil % 63.5 %      Lymphocyte % 27.5 %      Monocyte % 7.0 %      Eosinophil % 1.2 %      Basophil % 0.4 %      Immature Grans % 0.4 %      Neutrophils, Absolute 6.83 10*3/mm3      Lymphocytes, Absolute 2.95 10*3/mm3      Monocytes, Absolute 0.75 10*3/mm3      Eosinophils, Absolute 0.13  10*3/mm3      Basophils, Absolute 0.04 10*3/mm3      Immature Grans, Absolute 0.04 10*3/mm3      nRBC 0.0 /100 WBC     Lipase [[id number]]  (Normal) Collected: 08/22/23 1651    Specimen: Blood Updated: 08/22/23 1745     Lipase 46 U/L     hCG, Quantitative, Pregnancy [[id number]] Collected: 08/22/23 1651    Specimen: Blood Updated: 08/22/23 1742     HCG Quantitative <0.50 mIU/mL     Narrative:      HCG Ranges by Gestational Age    Females - non-pregnant premenopausal   </= 1mIU/mL HCG  Females - postmenopausal               </= 7mIU/mL HCG    3 Weeks       5.4   -      72 mIU/mL  4 Weeks      10.2   -     708 mIU/mL  5 Weeks       217   -   8,245 mIU/mL  6 Weeks       152   -  32,177 mIU/mL  7 Weeks     4,059   - 153,767 mIU/mL  8 Weeks    31,366   - 149,094 mIU/mL  9 Weeks    59,109   - 135,901 mIU/mL  10 Weeks   44,186   - 170,409 mIU/mL  12 Weeks   27,107   - 201,615 mIU/mL  14 Weeks   24,302   -  93,646 mIU/mL  15 Weeks   12,540   -  69,747 mIU/mL  16 Weeks    8,904   -  55,332 mIU/mL  17 Weeks    8,240   -  51,793 mIU/mL  18 Weeks    9,649   -  55,271 mIU/mL      Urinalysis With Microscopic If Indicated (No Culture) - Urine, Clean Catch [[id number]]  (Abnormal) Collected: 08/22/23 1842    Specimen: Urine, Clean Catch Updated: 08/22/23 1906     Color, UA Yellow     Appearance, UA Clear     pH, UA 6.5     Specific Gravity, UA >1.030     Glucose, UA >=1000 mg/dL (3+)     Ketones, UA Negative     Bilirubin, UA Negative     Blood, UA Negative     Protein, UA Negative     Leuk Esterase, UA Negative     Nitrite, UA Negative     Urobilinogen, UA 1.0 E.U./dL    Narrative:      Urine microscopic not indicated.    Occult Blood, Fecal By Immunoassay - Stool, Per Rectum [[id number]]  (Abnormal) Collected: 08/22/23 2111    Specimen: Stool from Per Rectum Updated: 08/22/23 2207     Occult Blood, Fecal by Immunoassay Positive             Imaging:    No Radiology Exams Resulted Within Past 24 Hours      Differential Diagnosis and  Discussion:    Abdominal Pain: Based on the patient's signs and symptoms, I considered abdominal aortic aneurysm, small bowel obstruction, pancreatitis, acute cholecystitis, acute appendecitis, peptic ulcer disease, gastritis, colitis, endocrine disorders, irritable bowel syndrome and other differential diagnosis an etiology of the patient's abdominal pain.  GI Bleeding: Differential diagnosis includes but is not limited to gastritis, gastric ulcer, stress ulcer, duodenitis, Trudi-Lang tears, esophageal varices, angiodysplasia, aortic enteric fistula, hematologic issues including thrombocytopenia, GI neoplasm, ulcerative colitis, Crohn's disease, diverticulosis, diverticulitis, hemorrhoids, aortic aneurysm, and polyps    All labs were reviewed and interpreted by me.  CT scan radiology impression was interpreted by me.    MDM  Number of Diagnoses or Management Options  Diagnosis management comments: Patient is afebrile nontoxic-appearing.  Vital signs are stable.  Patient reports blood in her stool.  On exam she has a small anal fissure no active bleeding.  Likely the cause of the bleeding.  Labs show no significant abnormality.  Hemoglobin is 14.7.  Patient was reports abdominal pain CT showed no acute finding.  Recommend close follow-up with her primary physician.  Discussed return precautions, discharge instructions and answered all her questions.       Amount and/or Complexity of Data Reviewed  Clinical lab tests: reviewed  Tests in the radiology section of CP[facility]: reviewed  Review and summarize past medical records: yes  Independent visualization of images, tracings, or specimens: yes    Risk of Complications, Morbidity, and/or Mortality  Presenting problems: moderate  Management options: moderate             Patient Care Considerations:    CHEST X-RAY: I considered ordering a chest x-ray however no respiratory symptoms      Consultants/Shared Management Plan:    None    Social Determinants of  Health:    Patient is independent, reliable, and has access to care.       Disposition and Care Coordination:    Discharged: The patient is suitable and stable for discharge with no need for consideration of observation or admission.    I have explained the patient's condition, diagnoses and treatment plan based on the information available to me at this time. I have answered questions and addressed any concerns. The patient has a good  understanding of the patient's diagnosis, condition, and treatment plan as can be expected at this point. The vital signs have been stable. The patient's condition is stable and appropriate for discharge from the emergency department.      The patient will pursue further outpatient evaluation with the primary care physician or other designated or consulting physician as outlined in the discharge instructions. They are agreeable to this plan of care and follow-up instructions have been explained in detail. The patient has received these instructions in written format and have expressed an understanding of the discharge instructions. The patient is aware that any significant change in condition or worsening of symptoms should prompt an immediate return to this or the closest emergency department or call to 911.  I have explained discharge medications and the need for follow up with the patient/caretakers. This was also printed in the discharge instructions. Patient was discharged with the following medications and follow up:      Medication List      No changes were made to your prescriptions during this visit.      Jasmin Vickers, APRN  [address]  [place] [address]  [phone]    In 2 days         Final diagnoses:   Left lower quadrant abdominal pain   Anal fissure   Rectal bleed        ED Disposition       ED Disposition   Discharge    Condition   Stable    Comment   --               This medical record created using voice recognition software.              Sonia Feliz MD  08/22/23 2398

## 2023-09-12 ENCOUNTER — OFFICE VISIT (OUTPATIENT)
Dept: CARDIOLOGY | Facility: CLINIC | Age: 18
End: 2023-09-12
Payer: COMMERCIAL

## 2023-09-12 VITALS
HEART RATE: 90 BPM | SYSTOLIC BLOOD PRESSURE: 129 MMHG | HEIGHT: 62 IN | WEIGHT: 174 LBS | BODY MASS INDEX: 32.02 KG/M2 | DIASTOLIC BLOOD PRESSURE: 86 MMHG

## 2023-09-12 DIAGNOSIS — R55 SYNCOPE, UNSPECIFIED SYNCOPE TYPE: ICD-10-CM

## 2023-09-12 DIAGNOSIS — I10 PRIMARY HYPERTENSION: Primary | ICD-10-CM

## 2023-09-12 DIAGNOSIS — R00.2 PALPITATIONS: ICD-10-CM

## 2023-09-12 DIAGNOSIS — E78.2 MIXED HYPERLIPIDEMIA: ICD-10-CM

## 2023-09-12 PROCEDURE — 99204 OFFICE O/P NEW MOD 45 MIN: CPT | Performed by: INTERNAL MEDICINE

## 2023-09-12 NOTE — PROGRESS NOTES
Chief Complaint  Hypertension and syncope    Subjective        Amarilis Vinson presents to [facility] CARDIOLOGY  History of present illness:    Patient is a 18-year-old female with past medical history significant for diabetes, hypertension and hyperlipidemia.  She presents with possible loss of consciousness episodes.  She states she had these as a child but they were very rare.  She feels like about 5 months ago they have become more frequent.  She states she passes out sitting or lying down.  She is not sure how long she is out for.  She states she is only confused for about 2 to 3 minutes after the episode.  There is no tongue biting or loss of urine.  She can also pass out standing up.  She is not apparently significantly hurt herself with these episodes.  She notes no nausea or vomiting with him.  Most of the time she does not remember remember much of the episode.  She does note that she will feel her heart racing a few times per week.  1 time she got up to go to the bathroom and felt her heart racing and dizzy.  She states her fiancé told her that 1 time she felt her heart racing and then passed out.  A lot of times she gets no warning of these episodes.  One time she was just sitting in the car as a passenger and passed out.  She notes no tobacco or alcohol.  Father has a history of hypertension diabetes and also apparent loss of consciousness episodes.      Past Medical History:   Diagnosis Date    Anxiety     COVID 08/07/2022    NO CURRENT SYMPTOMS    Diabetes mellitus     Foot pain, left     Hypertension     Migraines          Past Surgical History:   Procedure Laterality Date    FOOT SURGERY      DISTAL LEFT FIRST METATARSAL    HARDWARE REMOVAL Left 8/26/2022    Procedure: HARDWARE REMOVAL;  left first metatarsal shaft;  Surgeon: Michel Jaffe DPM;  Location: [facility] OR;  Service: Podiatry;  Laterality: Left;    ULNAR OSTEOPLASTY  2018          Social History      Socioeconomic History    Marital status: Single   Tobacco Use    Smoking status: Never    Smokeless tobacco: Never   Vaping Use    Vaping Use: Never used   Substance and Sexual Activity    Alcohol use: Not Currently    Drug use: Not Currently    Sexual activity: Yes         Family History   Adopted: Yes   Problem Relation Age of Onset    No Known Problems Mother     No Known Problems Father           No Known Allergies         Current Outpatient Medications:     aspirin-acetaminophen-caffeine (EXCEDRIN MIGRAINE) 250-250-65 MG per tablet, Take  by mouth., Disp: , Rfl:     atorvastatin (LIPITOR) 20 MG tablet, Take 1 tablet by mouth Every Night., Disp: 90 tablet, Rfl: 1    Continuous Blood Gluc  (Dexcom G6 ) device, 1 each Take As Directed., Disp: 1 each, Rfl: 2    Continuous Blood Gluc Sensor (Dexcom G6 Sensor), Every 10 (Ten) Days., Disp: 6 each, Rfl: 2    Continuous Blood Gluc Transmit (Dexcom G6 Transmitter) misc, 1 each Take As Directed., Disp: 2 each, Rfl: 3    escitalopram (Lexapro) 10 MG tablet, Take 1.5 tablets by mouth Daily for 30 days., Disp: 45 tablet, Rfl: 1    glucose blood test strip, Use three times per day as needed, Disp: 400 each, Rfl: 12    glucose monitoring kit (FREESTYLE) monitoring kit, 1 each 3 (Three) Times a Day As Needed (Blood Sugar Check)., Disp: 1 each, Rfl: 0    hydroCHLOROthiazide (HYDRODIURIL) 25 MG tablet, Take 1 tablet by mouth Daily., Disp: 90 tablet, Rfl: 1    hydrOXYzine (ATARAX) 10 MG tablet, Take 1-3 tab PO BID PRN anxiety, Disp: 120 tablet, Rfl: 1    Lancets (freestyle) lancets, Use three times per day as needed to check blood sugars, Disp: 100 each, Rfl: 3    lisinopril (PRINIVIL,ZESTRIL) 40 MG tablet, Take 1 tablet by mouth Daily., Disp: 90 tablet, Rfl: 1    Lo Loestrin Fe 1 MG-10 MCG / 10 MCG tablet, Take 1 tablet by mouth Daily., Disp: , Rfl:     meclizine (ANTIVERT) 25 MG tablet, Take 1 tablet by mouth 3 (Three) Times a Day As Needed for Dizziness.,  "Disp: 10 tablet, Rfl: 0    ondansetron ODT (ZOFRAN-ODT) 4 MG disintegrating tablet, Place 1 tablet on the tongue Every 4 (Four) Hours As Needed for Nausea or Vomiting., Disp: 15 tablet, Rfl: 0    Rimegepant Sulfate (NURTEC) 75 MG tablet dispersible tablet, Take 1 tablet by mouth Every Other Day As Needed (migraine)., Disp: 16 tablet, Rfl: 0    Current Facility-Administered Medications:     cloNIDine (CATAPRES) tablet 0.1 mg, 0.1 mg, Oral, Q12H, Jasmin Vickers APRN      ROS:  Cardiac review of systems is positive for loss of consciousness episodes and palpitations    Objective     /86   Pulse 90   Ht 157.5 cm (62\")   Wt 78.9 kg (174 lb)   BMI 31.83 kg/m²       General Appearance:   well developed  well nourished  HENT:   oropharynx moist  lips not cyanotic  Respiratory:  no respiratory distress  normal breath sounds  no rales  Cardiovascular:  no jugular venous distention  regular rhythm  S1 normal, S2 normal  no S3, no S4   no murmur  no rub, no thrill  No carotid bruit  pedal pulses normal  lower extremity edema: none    Musculoskeletal:  no clubbing of fingers.   normocephalic, head atraumatic  Skin:   warm, dry  Psychiatric:  judgement and insight appropriate  normal mood and affect    ECHO:    STRESS:    CATH:  No results found for this or any previous visit.    BMP:     Glucose   Date Value Ref Range Status   08/22/2023 326 (H) 65 - 99 mg/dL Final     BUN   Date Value Ref Range Status   08/22/2023 17 6 - 20 mg/dL Final     Creatinine   Date Value Ref Range Status   08/22/2023 0.97 0.57 - 1.00 mg/dL Final     Sodium   Date Value Ref Range Status   08/22/2023 134 (L) 136 - 145 mmol/L Final     Potassium   Date Value Ref Range Status   08/22/2023 4.4 3.5 - 5.2 mmol/L Final     Chloride   Date Value Ref Range Status   08/22/2023 97 (L) 98 - 107 mmol/L Final     CO2   Date Value Ref Range Status   08/22/2023 22.4 22.0 - 29.0 mmol/L Final     Calcium   Date Value Ref Range Status   08/22/2023 9.8 8.6 " - 10.5 mg/dL Final     BUN/Creatinine Ratio   Date Value Ref Range Status   08/22/2023 17.5 7.0 - 25.0 Final     Anion Gap   Date Value Ref Range Status   08/22/2023 14.6 5.0 - 15.0 mmol/L Final     eGFR   Date Value Ref Range Status   08/22/2023 86.0 >60.0 mL/min/1.73 Final     LIPIDS:  Total Cholesterol   Date Value Ref Range Status   07/19/2023 197 0 - 200 mg/dL Final     Triglycerides   Date Value Ref Range Status   07/19/2023 287 (H) 0 - 150 mg/dL Final     HDL Cholesterol   Date Value Ref Range Status   07/19/2023 29 (L) 40 - 60 mg/dL Final     LDL Cholesterol    Date Value Ref Range Status   07/19/2023 118 (H) 0 - 100 mg/dL Final     VLDL Cholesterol   Date Value Ref Range Status   07/19/2023 50 (H) 5 - 40 mg/dL Final     LDL/HDL Ratio   Date Value Ref Range Status   07/19/2023 3.81  Final         Procedures             ASSESSMENT:  Diagnoses and all orders for this visit:    1. Primary hypertension (Primary)    2. Mixed hyperlipidemia    3. Palpitations  -     Cardiac Event Monitor; Future    4. Syncope, unspecified syncope type  -     Cardiac Event Monitor; Future  -     Tilt Table; Future         PLAN:    1.  I have asked the patient to place an event recorder for 2 weeks to try to catch some of these episodes of heart racing.  2.  Checked orthostatic blood pressure measurements on the patient and her blood pressure and heart rate remained very stable.  3.  Asked the patient to check a tilt table test to see if we can reproduce these episodes.  The episodes are very strange in the fact that they can occur sitting or lying down.  I do not think there is seizure episodes as she has very short episode of confusion and no tongue biting or urinary incontinence.  If we do not see anything with the tilt table or the event recorder at that time though we may consider having her evaluated by neurology.  4.  Reviewed patient's EKG from 8/3/2023 which showed normal sinus rhythm and nonspecific T wave changes.  5.   Blood pressures under good control.  6.  Continue the Lipitor.  Patient had cholesterol checked 7/19/2023 with , HDL 29, and triglycerides 287.    Return in about 6 weeks (around 10/24/2023) for juliana baker.     Patient was given instructions and counseling regarding her condition or for health maintenance advice. Please see specific information pulled into the AVS if appropriate.         Cholo Torres MD   9/12/2023  15:54 EDT

## 2023-11-01 RX ORDER — NORETHINDRONE ACETATE AND ETHINYL ESTRADIOL, ETHINYL ESTRADIOL AND FERROUS FUMARATE 1MG-10(24)
1 KIT ORAL DAILY
Qty: 28 TABLET | Refills: 0 | Status: SHIPPED | OUTPATIENT
Start: 2023-11-01

## 2023-12-04 RX ORDER — NORETHINDRONE ACETATE AND ETHINYL ESTRADIOL, ETHINYL ESTRADIOL AND FERROUS FUMARATE 1MG-10(24)
1 KIT ORAL DAILY
Qty: 28 TABLET | Refills: 0 | Status: SHIPPED | OUTPATIENT
Start: 2023-12-04

## 2023-12-28 ENCOUNTER — TELEPHONE (OUTPATIENT)
Dept: INTERNAL MEDICINE | Facility: CLINIC | Age: 18
End: 2023-12-28

## 2023-12-28 NOTE — TELEPHONE ENCOUNTER
Caller: Amarilis Vinson    Relationship to patient: Self    Best call back number: [phone]     Patient is needing: PATIENT STATES THAT SHE STARTED HAVING PERIODS EVERY WEEK SINCE OCTOBER 2023. PATIENT THINKS THIS IS DUE TO HER BIRTH CONTROL AND WOULD LIKE TO TRY AND CHANGE IT.     PATIENT MADE FIRST AVAILABLE APPOINTMENT THAT IS ON 1.30.24. PLEASE CALL BACK TO ADVISE A PLAN OF CARE.

## 2024-01-02 RX ORDER — NORETHINDRONE ACETATE AND ETHINYL ESTRADIOL, ETHINYL ESTRADIOL AND FERROUS FUMARATE 1MG-10(24)
1 KIT ORAL DAILY
Qty: 28 TABLET | Refills: 0 | Status: SHIPPED | OUTPATIENT
Start: 2024-01-02

## 2024-01-05 ENCOUNTER — APPOINTMENT (OUTPATIENT)
Dept: ULTRASOUND IMAGING | Facility: HOSPITAL | Age: 19
End: 2024-01-05
Payer: COMMERCIAL

## 2024-01-05 ENCOUNTER — HOSPITAL ENCOUNTER (EMERGENCY)
Facility: HOSPITAL | Age: 19
Discharge: HOME | End: 2024-01-05
Attending: EMERGENCY MEDICINE
Payer: COMMERCIAL

## 2024-01-05 VITALS
WEIGHT: 173.06 LBS | TEMPERATURE: 98 F | HEART RATE: 102 BPM | RESPIRATION RATE: 16 BRPM | OXYGEN SATURATION: 96 % | SYSTOLIC BLOOD PRESSURE: 128 MMHG | HEIGHT: 62 IN | DIASTOLIC BLOOD PRESSURE: 96 MMHG | BODY MASS INDEX: 31.85 KG/M2

## 2024-01-05 DIAGNOSIS — R42 DIZZINESS: Primary | ICD-10-CM

## 2024-01-05 DIAGNOSIS — N92.1 MENOMETRORRHAGIA: ICD-10-CM

## 2024-01-05 DIAGNOSIS — E11.65 TYPE 2 DIABETES MELLITUS WITH HYPERGLYCEMIA, WITHOUT LONG-TERM CURRENT USE OF INSULIN: ICD-10-CM

## 2024-01-05 LAB
ABO GROUP BLD: NORMAL
ALBUMIN SERPL-MCNC: 4.1 G/DL (ref 3.5–5.2)
ALBUMIN/GLOB SERPL: 1.3 G/DL
ALP SERPL-CCNC: 137 U/L (ref 39–117)
ALT SERPL W P-5'-P-CCNC: 97 U/L (ref 1–33)
ANION GAP SERPL CALCULATED.3IONS-SCNC: 14.3 MMOL/L (ref 5–15)
AST SERPL-CCNC: 113 U/L (ref 1–32)
BACTERIA UR QL AUTO: ABNORMAL /HPF
BASOPHILS # BLD AUTO: 0.04 10*3/MM3 (ref 0–0.2)
BASOPHILS NFR BLD AUTO: 0.5 % (ref 0–1.5)
BILIRUB SERPL-MCNC: 0.2 MG/DL (ref 0–1.2)
BILIRUB UR QL STRIP: NEGATIVE
BLD GP AB SCN SERPL QL: NEGATIVE
BUN SERPL-MCNC: 10 MG/DL (ref 6–20)
BUN/CREAT SERPL: 13.2 (ref 7–25)
C TRACH RRNA CVX QL NAA+PROBE: NOT DETECTED
CALCIUM SPEC-SCNC: 9.8 MG/DL (ref 8.6–10.5)
CANDIDA SPECIES: POSITIVE
CHLORIDE SERPL-SCNC: 98 MMOL/L (ref 98–107)
CLARITY UR: ABNORMAL
CO2 SERPL-SCNC: 20.7 MMOL/L (ref 22–29)
COLOR UR: YELLOW
CREAT SERPL-MCNC: 0.76 MG/DL (ref 0.57–1)
D-LACTATE SERPL-SCNC: 1.6 MMOL/L (ref 0.5–2)
DEPRECATED RDW RBC AUTO: 35.1 FL (ref 37–54)
EGFRCR SERPLBLD CKD-EPI 2021: 115.2 ML/MIN/1.73
EOSINOPHIL # BLD AUTO: 0.25 10*3/MM3 (ref 0–0.4)
EOSINOPHIL NFR BLD AUTO: 3 % (ref 0.3–6.2)
ERYTHROCYTE [DISTWIDTH] IN BLOOD BY AUTOMATED COUNT: 11.9 % (ref 12.3–15.4)
GARDNERELLA VAGINALIS: POSITIVE
GLOBULIN UR ELPH-MCNC: 3.2 GM/DL
GLUCOSE BLDC GLUCOMTR-MCNC: 437 MG/DL (ref 70–99)
GLUCOSE SERPL-MCNC: 377 MG/DL (ref 65–99)
GLUCOSE UR STRIP-MCNC: ABNORMAL MG/DL
HCG INTACT+B SERPL-ACNC: <0.5 MIU/ML
HCT VFR BLD AUTO: 41.6 % (ref 34–46.6)
HGB BLD-MCNC: 14.4 G/DL (ref 12–15.9)
HGB UR QL STRIP.AUTO: NEGATIVE
HYALINE CASTS UR QL AUTO: ABNORMAL /LPF
IMM GRANULOCYTES # BLD AUTO: 0.06 10*3/MM3 (ref 0–0.05)
IMM GRANULOCYTES NFR BLD AUTO: 0.7 % (ref 0–0.5)
KETONES UR QL STRIP: NEGATIVE
LEUKOCYTE ESTERASE UR QL STRIP.AUTO: ABNORMAL
LYMPHOCYTES # BLD AUTO: 3.08 10*3/MM3 (ref 0.7–3.1)
LYMPHOCYTES NFR BLD AUTO: 36.6 % (ref 19.6–45.3)
MCH RBC QN AUTO: 28.5 PG (ref 26.6–33)
MCHC RBC AUTO-ENTMCNC: 34.6 G/DL (ref 31.5–35.7)
MCV RBC AUTO: 82.2 FL (ref 79–97)
MONOCYTES # BLD AUTO: 0.43 10*3/MM3 (ref 0.1–0.9)
MONOCYTES NFR BLD AUTO: 5.1 % (ref 5–12)
N GONORRHOEA RRNA SPEC QL NAA+PROBE: NOT DETECTED
NEUTROPHILS NFR BLD AUTO: 4.56 10*3/MM3 (ref 1.7–7)
NEUTROPHILS NFR BLD AUTO: 54.1 % (ref 42.7–76)
NITRITE UR QL STRIP: NEGATIVE
NRBC BLD AUTO-RTO: 0 /100 WBC (ref 0–0.2)
PH UR STRIP.AUTO: 6 [PH] (ref 5–8)
PLATELET # BLD AUTO: 308 10*3/MM3 (ref 140–450)
PMV BLD AUTO: 9.9 FL (ref 6–12)
POTASSIUM SERPL-SCNC: 4.7 MMOL/L (ref 3.5–5.2)
PROT SERPL-MCNC: 7.3 G/DL (ref 6–8.5)
PROT UR QL STRIP: NEGATIVE
RBC # BLD AUTO: 5.06 10*6/MM3 (ref 3.77–5.28)
RBC # UR STRIP: ABNORMAL /HPF
REF LAB TEST METHOD: ABNORMAL
RH BLD: POSITIVE
SODIUM SERPL-SCNC: 133 MMOL/L (ref 136–145)
SP GR UR STRIP: >1.03 (ref 1–1.03)
SQUAMOUS #/AREA URNS HPF: ABNORMAL /HPF
T VAGINALIS DNA VAG QL PROBE+SIG AMP: NEGATIVE
T&S EXPIRATION DATE: NORMAL
UROBILINOGEN UR QL STRIP: ABNORMAL
WBC # UR STRIP: ABNORMAL /HPF
WBC NRBC COR # BLD AUTO: 8.42 10*3/MM3 (ref 3.4–10.8)
WHOLE BLOOD HOLD COAG: NORMAL

## 2024-01-05 PROCEDURE — 80053 COMPREHEN METABOLIC PANEL: CPT

## 2024-01-05 PROCEDURE — 87660 TRICHOMONAS VAGIN DIR PROBE: CPT

## 2024-01-05 PROCEDURE — 99284 EMERGENCY DEPT VISIT MOD MDM: CPT

## 2024-01-05 PROCEDURE — 87491 CHLMYD TRACH DNA AMP PROBE: CPT

## 2024-01-05 PROCEDURE — 63710000001 INSULIN LISPRO (HUMAN) PER 5 UNITS: Performed by: EMERGENCY MEDICINE

## 2024-01-05 PROCEDURE — 86850 RBC ANTIBODY SCREEN: CPT

## 2024-01-05 PROCEDURE — 36415 COLL VENOUS BLD VENIPUNCTURE: CPT

## 2024-01-05 PROCEDURE — 85025 COMPLETE CBC W/AUTO DIFF WBC: CPT

## 2024-01-05 PROCEDURE — 83605 ASSAY OF LACTIC ACID: CPT

## 2024-01-05 PROCEDURE — 86900 BLOOD TYPING SEROLOGIC ABO: CPT

## 2024-01-05 PROCEDURE — 87591 N.GONORRHOEAE DNA AMP PROB: CPT

## 2024-01-05 PROCEDURE — 82948 REAGENT STRIP/BLOOD GLUCOSE: CPT

## 2024-01-05 PROCEDURE — 76830 TRANSVAGINAL US NON-OB: CPT

## 2024-01-05 PROCEDURE — 84702 CHORIONIC GONADOTROPIN TEST: CPT

## 2024-01-05 PROCEDURE — 87510 GARDNER VAG DNA DIR PROBE: CPT

## 2024-01-05 PROCEDURE — 81001 URINALYSIS AUTO W/SCOPE: CPT

## 2024-01-05 PROCEDURE — 87480 CANDIDA DNA DIR PROBE: CPT

## 2024-01-05 PROCEDURE — 86901 BLOOD TYPING SEROLOGIC RH(D): CPT

## 2024-01-05 RX ORDER — INSULIN LISPRO 100 [IU]/ML
7 INJECTION, SOLUTION INTRAVENOUS; SUBCUTANEOUS ONCE
Status: COMPLETED | OUTPATIENT
Start: 2024-01-05 | End: 2024-01-05

## 2024-01-05 RX ADMIN — INSULIN LISPRO 7 UNITS: 100 INJECTION, SOLUTION INTRAVENOUS; SUBCUTANEOUS at 21:57

## 2024-01-05 NOTE — ED PROVIDER NOTES
Time: 4:07 PM EST  Date of encounter:  1/5/2024  Independent Historian/Clinical History and Information was obtained by:   Patient    History is limited by: N/A    Chief Complaint   Patient presents with    Vaginal Bleeding    Dizziness         History of Present Illness:  Patient is a 18 y.o. year old female who presents to the emergency department for evaluation of vaginal bleeding since October.  Patient reports the past few weeks she has had increased pelvic pain, now having some weakness and occasional dizziness, believes she may be losing too much blood.  Patient states she does not have a gynecologist, reports she is prescribed Loestrin from her PCP and has been taking that for approximately 1 year.  Patient also reports vaginal pain and itching.  Patient denies concern for STI.   She also states she is off of all of her diabetic medications.    Patient Care Team  Primary Care Provider: Jasmin Vickers APRN    Past Medical History:     No Known Allergies  Past Medical History:   Diagnosis Date    Anxiety     COVID 08/07/2022    NO CURRENT SYMPTOMS    Diabetes mellitus     Foot pain, left     Hypertension     Migraines      Past Surgical History:   Procedure Laterality Date    FOOT SURGERY      DISTAL LEFT FIRST METATARSAL    HARDWARE REMOVAL Left 8/26/2022    Procedure: HARDWARE REMOVAL;  left first metatarsal shaft;  Surgeon: Michel Jaffe DPM;  Location: [facility] OR;  Service: Podiatry;  Laterality: Left;    ULNAR OSTEOPLASTY  2018     Family History   Adopted: Yes   Problem Relation Age of Onset    No Known Problems Mother     No Known Problems Father        Home Medications:  Prior to Admission medications    Medication Sig Start Date End Date Taking? Authorizing Provider   aspirin-acetaminophen-caffeine (EXCEDRIN MIGRAINE) 250-250-65 MG per tablet Take  by mouth.    Provider, MD Edison   atorvastatin (LIPITOR) 20 MG tablet Take 1 tablet by mouth Every Night. 7/21/23   Rancho  JOSE Castellanos   Continuous Blood Gluc  (Dexcom G6 ) device 1 each Take As Directed. 1/20/23   Jasmin Vickers APRN   Continuous Blood Gluc Sensor (Dexcom G6 Sensor) Every 10 (Ten) Days. 6/22/23   Jasmin Vickers APRN   Continuous Blood Gluc Transmit (Dexcom G6 Transmitter) misc 1 each Take As Directed. 8/22/23   Jasmin Vickers APRN   escitalopram (Lexapro) 10 MG tablet Take 1.5 tablets by mouth Daily for 30 days. 8/14/23 9/13/23  Catie Murillo PA-C   glucose blood test strip Use three times per day as needed 12/9/22   Jasmin Vickers APRN   hydroCHLOROthiazide (HYDRODIURIL) 25 MG tablet Take 1 tablet by mouth Daily. 7/19/23   Jasmin Vickers APRN   hydrOXYzine (ATARAX) 10 MG tablet Take 1-3 tab PO BID PRN anxiety 8/14/23   Catie Murillo PA-C   Lancets (freestyle) lancets Use three times per day as needed to check blood sugars 10/18/22   Jasmin Vickers APRN   lisinopril (PRINIVIL,ZESTRIL) 40 MG tablet Take 1 tablet by mouth Daily. 7/19/23   Jasmin Vickers APRN   Lo Loestrin Fe 1 MG-10 MCG / 10 MCG tablet TAKE 1 TABLET BY MOUTH EVERY DAY 1/2/24   Jasmin Vickers APRN   meclizine (ANTIVERT) 25 MG tablet Take 1 tablet by mouth 3 (Three) Times a Day As Needed for Dizziness. 8/22/23   Jasmin Vickers APRN   ondansetron ODT (ZOFRAN-ODT) 4 MG disintegrating tablet Place 1 tablet on the tongue Every 4 (Four) Hours As Needed for Nausea or Vomiting. 8/22/23   Jasmin Vickers APRN   Rimegepant Sulfate (NURTEC) 75 MG tablet dispersible tablet Take 1 tablet by mouth Every Other Day As Needed (migraine). 7/19/23   Jasmin Vickers JOSE        Social History:   Social History     Tobacco Use    Smoking status: Never    Smokeless tobacco: Never   Vaping Use    Vaping Use: Never used   Substance Use Topics    Alcohol use: Not Currently    Drug use: Not Currently         Review of Systems:  Review of Systems   Constitutional:  Positive for  "fatigue.   Genitourinary:  Positive for vaginal bleeding and vaginal pain.   Neurological:  Positive for dizziness.        Physical Exam:  /96   Pulse 102   Temp 98 °F (36.7 °C) (Oral)   Resp 16   Ht 157.5 cm (62\")   Wt 78.5 kg (173 lb 1 oz)   SpO2 96%   BMI 31.65 kg/m²           General: Awake alert and in no obvious distress    HEENT: Head normocephalic atraumatic, eyes PERRLA EOMI, nose normal, oropharynx normal.    Neck: Supple full range of motion, no meningismus, no lymphadenopathy    Heart: Regular rate and rhythm, no murmurs or rubs, 2+ radial pulses bilaterally    Lungs: Clear to auscultation bilaterally without wheezes or crackles, no respiratory distress    Abdomen: Soft, nontender, nondistended, no rebound or guarding    Skin: Warm, dry, no rash    Musculoskeletal: Normal range of motion, no lower extremity edema    Neurologic: Oriented x3, no motor deficits no sensory deficits    Psychiatric: Mood appears stable, no psychosis              Procedures:  Procedures      Medical Decision Making:      Comorbidities that affect care:    Menometrorrhagia, Diabetes    External Notes reviewed:    Previous Labs: Patient's hemoglobin is 14.4 today and I compared this to her old labs showing 14.7 earlier in the year.      The following orders were placed and all results were independently analyzed by me:  Orders Placed This Encounter   Procedures    Chlamydia trachomatis, Neisseria gonorrhoeae, PCR - Swab, Cervix    Gardnerella vaginalis, Trichomonas vaginalis, Candida albicans, DNA - Swab, Vagina    US Non-ob Transvaginal    hCG, Quantitative, Pregnancy    Comprehensive Metabolic Panel    Lactic Acid, Plasma    CBC Auto Differential    Urinalysis With Microscopic If Indicated (No Culture) - Urine, Clean Catch    Urinalysis, Microscopic Only - Urine, Clean Catch    POC Glucose Once    Type & Screen    CBC & Differential    Extra Tubes    Light Blue Top       Medications Given in the Emergency " Department:  Medications   Insulin Lispro (humaLOG) injection 7 Units (7 Units Subcutaneous Given 1/5/24 2157)        ED Course:             Labs:    Lab Results (last 24 hours)       Procedure Component Value Units Date/Time    hCG, Quantitative, Pregnancy [[id number]] Collected: 01/05/24 1611    Specimen: Blood from Arm, Right Updated: 01/05/24 1640     HCG Quantitative <0.50 mIU/mL     Narrative:      HCG Ranges by Gestational Age    Females - non-pregnant premenopausal   </= 1mIU/mL HCG  Females - postmenopausal               </= 7mIU/mL HCG    3 Weeks       5.4   -      72 mIU/mL  4 Weeks      10.2   -     708 mIU/mL  5 Weeks       217   -   8,245 mIU/mL  6 Weeks       152   -  32,177 mIU/mL  7 Weeks     4,059   - 153,767 mIU/mL  8 Weeks    31,366   - 149,094 mIU/mL  9 Weeks    59,109   - 135,901 mIU/mL  10 Weeks   44,186   - 170,409 mIU/mL  12 Weeks   27,107   - 201,615 mIU/mL  14 Weeks   24,302   -  93,646 mIU/mL  15 Weeks   12,540   -  69,747 mIU/mL  16 Weeks    8,904   -  55,332 mIU/mL  17 Weeks    8,240   -  51,793 mIU/mL  18 Weeks    9,649   -  55,271 mIU/mL      Comprehensive Metabolic Panel [[id number]]  (Abnormal) Collected: 01/05/24 1611    Specimen: Blood from Arm, Right Updated: 01/05/24 1654     Glucose 377 mg/dL      BUN 10 mg/dL      Creatinine 0.76 mg/dL      Sodium 133 mmol/L      Potassium 4.7 mmol/L      Chloride 98 mmol/L      CO2 20.7 mmol/L      Calcium 9.8 mg/dL      Total Protein 7.3 g/dL      Albumin 4.1 g/dL      ALT (SGPT) 97 U/L      AST (SGOT) 113 U/L      Alkaline Phosphatase 137 U/L      Total Bilirubin 0.2 mg/dL      Globulin 3.2 gm/dL      A/G Ratio 1.3 g/dL      BUN/Creatinine Ratio 13.2     Anion Gap 14.3 mmol/L      eGFR 115.2 mL/min/1.73     Narrative:      GFR Normal >60  Chronic Kidney Disease <60  Kidney Failure <15      CBC & Differential [[id number]]  (Abnormal) Collected: 01/05/24 1611    Specimen: Blood from Arm, Right Updated: 01/05/24 1622    Narrative:      The  following orders were created for panel order CBC & Differential.  Procedure                               Abnormality         Status                     ---------                               -----------         ------                     CBC Auto Differential[[id number]]        Abnormal            Final result                 Please view results for these tests on the individual orders.    Lactic Acid, Plasma [[id number]]  (Normal) Collected: 01/05/24 1611    Specimen: Blood from Arm, Right Updated: 01/05/24 1638     Lactate 1.6 mmol/L     CBC Auto Differential [[id number]]  (Abnormal) Collected: 01/05/24 1611    Specimen: Blood from Arm, Right Updated: 01/05/24 1622     WBC 8.42 10*3/mm3      RBC 5.06 10*6/mm3      Hemoglobin 14.4 g/dL      Hematocrit 41.6 %      MCV 82.2 fL      MCH 28.5 pg      MCHC 34.6 g/dL      RDW 11.9 %      RDW-SD 35.1 fl      MPV 9.9 fL      Platelets 308 10*3/mm3      Neutrophil % 54.1 %      Lymphocyte % 36.6 %      Monocyte % 5.1 %      Eosinophil % 3.0 %      Basophil % 0.5 %      Immature Grans % 0.7 %      Neutrophils, Absolute 4.56 10*3/mm3      Lymphocytes, Absolute 3.08 10*3/mm3      Monocytes, Absolute 0.43 10*3/mm3      Eosinophils, Absolute 0.25 10*3/mm3      Basophils, Absolute 0.04 10*3/mm3      Immature Grans, Absolute 0.06 10*3/mm3      nRBC 0.0 /100 WBC     Urinalysis With Microscopic If Indicated (No Culture) - Urine, Clean Catch [[id number]]  (Abnormal) Collected: 01/05/24 1617    Specimen: Urine, Clean Catch Updated: 01/05/24 1646     Color, UA Yellow     Appearance, UA Cloudy     pH, UA 6.0     Specific Gravity, UA >1.030     Glucose, UA >=1000 mg/dL (3+)     Ketones, UA Negative     Bilirubin, UA Negative     Blood, UA Negative     Protein, UA Negative     Leuk Esterase, UA Trace     Nitrite, UA Negative     Urobilinogen, UA 0.2 E.U./dL    Urinalysis, Microscopic Only - Urine, Clean Catch [[id number]]  (Abnormal) Collected: 01/05/24 1617    Specimen: Urine, Clean Catch  Updated: 01/05/24 1646     RBC, UA 0-2 /HPF      WBC, UA 11-20 /HPF      Bacteria, UA 1+ /HPF      Squamous Epithelial Cells, UA 13-20 /HPF      Hyaline Casts, UA None Seen /LPF      Methodology Automated Microscopy    Chlamydia trachomatis, Neisseria gonorrhoeae, PCR - Swab, Cervix [[id number]] Collected: 01/05/24 2114    Specimen: Swab from Cervix Updated: 01/05/24 2127    Gardnerella vaginalis, Trichomonas vaginalis, Candida albicans, DNA - Swab, Vagina [[id number]] Collected: 01/05/24 2114    Specimen: Swab from Vagina Updated: 01/05/24 2127    POC Glucose Once [[id number]]  (Abnormal) Collected: 01/05/24 2118    Specimen: Blood Updated: 01/05/24 2121     Glucose 437 mg/dL      Comment: Serial Number: [id number]:  [id number]                Imaging:    US Non-ob Transvaginal    Result Date: 1/5/2024  PROCEDURE: US NON-OB TRANSVAGINAL  COMPARISON: [facility], CT, CT ABDOMEN PELVIS W CONTRAST, 8/22/2023, 23:47.  INDICATIONS: pelvic pain, vaginal bleeding  TECHNIQUE: Ultrasound examination of the pelvis was performed, using endovaginal technique.   FINDINGS:  The uterus measures 6.6 cm in length.  The uterus measures 3.8 cm x 3.3 cm in transverse and AP dimension.  The myometrium is uniformly echoic.  The endometrium is uniformly echoic.  The endometrial bi layer measures 4 mm.  The right ovary is not visualized.  The left ovary has a normal appearance, and contains small cysts or follicles.  No free fluid is evident.       Transvaginal pelvic ultrasound, as above.     NII DELAROSA MD       Electronically Signed and Approved By: NII DELAROSA MD on 1/05/2024 at 17:35                Differential Diagnosis and Discussion:      Vaginal Bleeding: Differential diagnosis includes but is not limited to foreign body, tumor, vaginitis, dysfunctional uterine bleeding, endocrine abnormalities, coagulation disorder, systemic illness, polyps, complications of pregnancy (possible ectopic pregnancy).    All  labs were reviewed and interpreted by me.  Ultrasound impression was interpreted by me.     MDM     Amount and/or Complexity of Data Reviewed  Clinical lab tests: reviewed  Tests in the radiology section of CPT®: reviewed  Decide to obtain previous medical records or to obtain history from someone other than the patient: yes           This patient is a 18-year-old female with type 2 diabetes off of her medications, presents with heavy vaginal bleeding going on for months now despite taking OCP.    She was having some dizziness and thought she might be losing too much blood but her hemoglobin here is actually normal at 14.4 and no significant change from baseline hemoglobin of 14.7.    Lab work does show mild signs of dehydration and blood sugar of 400 range but no signs of ketoacidosis seen.    I will give her subcu dose of insulin here and start her on Ozempic diabetic medication that she has had previously and refer her to follow-up with endocrinology.    In addition, I will have her continue her OCP and have her follow-up with GYN clinic for her menometrorrhagia.          Vital signs are stable here and no signs of sepsis or hemorrhage and I think she can be discharged home.                Patient Care Considerations:          Consultants/Shared Management Plan:        Social Determinants of Health:    Patient is independent, reliable, and has access to care.       Disposition and Care Coordination:    Discharged: The patient is suitable and stable for discharge with no need for consideration of observation or admission.    I have explained the patient´s condition, diagnoses and treatment plan based on the information available to me at this time. I have answered questions and addressed any concerns. The patient has a good  understanding of the patient´s diagnosis, condition, and treatment plan as can be expected at this point. The vital signs have been stable. The patient´s condition is stable and appropriate  for discharge from the emergency department.      The patient will pursue further outpatient evaluation with the primary care physician or other designated or consulting physician as outlined in the discharge instructions. They are agreeable to this plan of care and follow-up instructions have been explained in detail. The patient has received these instructions in written format and have expressed an understanding of the discharge instructions. The patient is aware that any significant change in condition or worsening of symptoms should prompt an immediate return to this or the closest emergency department or call to [facility].  I have explained discharge medications and the need for follow up with the patient/caretakers. This was also printed in the discharge instructions. Patient was discharged with the following medications and follow up:      Medication List        New Prescriptions      Semaglutide(0.25 or 0.5MG/DOS) 2 MG/1.5ML solution pen-injector  Commonly known as: OZEMPIC  Inject 0.25 mg under the skin into the appropriate area as directed 1 (One) Time Per Week.               Where to Get Your Medications        These medications were sent to [id number] - [place], [place] - [address] AT [facility] - [phone]  - [phone]   [address] [place] [place] [address]      Phone: [phone]   Semaglutide(0.25 or 0.5MG/DOS) 2 MG/1.5ML solution pen-injector      Jasmin Vickers, APRN  [address] [facility]  [address]  [place] [address]  [phone]    Call in 1 day  As needed, for a follow-up appointment    Suresh Contreras MD  [address] [place] [address]  [phone]    Call in 2 days  for a follow-up appointment for your heavy periods    Monet Nava, APRN  [address] [facility] [address] [place] [address]  [phone]    Call in 2 days  for a follow-up appointment for further management of your diabetes       Final diagnoses:   Dizziness    Type 2 diabetes mellitus with hyperglycemia, without long-term current use of insulin   Menometrorrhagia        ED Disposition       ED Disposition   Discharge    Condition   Stable    Comment   --               This medical record created using voice recognition software.             Fabrice Patel MD  01/05/24 0575

## 2024-01-06 NOTE — DISCHARGE INSTRUCTIONS
Your blood counts were actually still normal, so no severe blood loss noted, but you should keep taking your oral contraceptive pill and follow-up with GYN clinic above (Dr. Contreras).    In addition, your blood sugars were about 400 and this can be causing you to get dehydrated and dizzy, so drink plenty of fluids to stay hydrated and start taking pill for your diabetes and please follow-up with endocrinology above (Monet Nava).

## 2024-01-07 ENCOUNTER — TELEPHONE (OUTPATIENT)
Dept: EMERGENCY DEPT | Facility: HOSPITAL | Age: 19
End: 2024-01-07
Payer: COMMERCIAL

## 2024-01-07 RX ORDER — FLUCONAZOLE 150 MG/1
150 TABLET ORAL ONCE
Qty: 2 TABLET | Refills: 0 | Status: SHIPPED | OUTPATIENT
Start: 2024-01-07 | End: 2024-01-07

## 2024-01-07 RX ORDER — METRONIDAZOLE 250 MG/1
500 TABLET ORAL 2 TIMES DAILY
Qty: 14 TABLET | Refills: 0 | Status: SHIPPED | OUTPATIENT
Start: 2024-01-07 | End: 2024-01-14

## 2024-01-28 DIAGNOSIS — F41.9 ANXIETY: Chronic | ICD-10-CM

## 2024-01-28 DIAGNOSIS — I10 HYPERTENSION, UNSPECIFIED TYPE: ICD-10-CM

## 2024-01-28 DIAGNOSIS — F33.0 MAJOR DEPRESSIVE DISORDER, RECURRENT EPISODE, MILD DEGREE: Chronic | ICD-10-CM

## 2024-01-28 DIAGNOSIS — I10 HYPERTENSION, UNSPECIFIED TYPE: Chronic | ICD-10-CM

## 2024-01-29 ENCOUNTER — TELEPHONE (OUTPATIENT)
Dept: INTERNAL MEDICINE | Facility: CLINIC | Age: 19
End: 2024-01-29
Payer: COMMERCIAL

## 2024-01-29 RX ORDER — HYDROCHLOROTHIAZIDE 25 MG/1
25 TABLET ORAL DAILY
Qty: 90 TABLET | Refills: 1 | Status: SHIPPED | OUTPATIENT
Start: 2024-01-29

## 2024-01-29 RX ORDER — LISINOPRIL 40 MG/1
40 TABLET ORAL DAILY
Qty: 90 TABLET | Refills: 1 | Status: SHIPPED | OUTPATIENT
Start: 2024-01-29

## 2024-01-29 RX ORDER — NORETHINDRONE ACETATE AND ETHINYL ESTRADIOL, ETHINYL ESTRADIOL AND FERROUS FUMARATE 1MG-10(24)
1 KIT ORAL DAILY
Qty: 28 TABLET | Refills: 3 | Status: SHIPPED | OUTPATIENT
Start: 2024-01-29

## 2024-01-29 RX ORDER — SERTRALINE HYDROCHLORIDE 100 MG/1
100 TABLET, FILM COATED ORAL DAILY
Qty: 90 TABLET | Refills: 1 | OUTPATIENT
Start: 2024-01-29

## 2024-01-29 NOTE — TELEPHONE ENCOUNTER
Caller: Amarilis Vinson    Relationship: Self    Best call back number: [phone]    Who is your current provider: AMARILIS WOODRUFF     Is your current provider offboarding? NO    Who would you like your new provider to be: DR. EWING     What are your reasons for transferring care: FAMILY MEMBERS SEE DR. EWING

## 2024-01-29 NOTE — TELEPHONE ENCOUNTER
Spoke with patient and let her know the appt for 01/30 has to be cancelled and I do not have any providers here that could see her that same day.   The phone disconnected when I was trying to search for an appointment this week.   I called patient back and left a message that we have availability on Friday if patient calls back ASAP.

## 2024-01-30 NOTE — TELEPHONE ENCOUNTER
Pt is scheduled to be seen on 02/06/24 by Jessica to address her glucose levels.  New Pt appt with Dr Hobson scheduled 02/27/2024.

## 2024-02-06 ENCOUNTER — OFFICE VISIT (OUTPATIENT)
Dept: INTERNAL MEDICINE | Facility: CLINIC | Age: 19
End: 2024-02-06
Payer: COMMERCIAL

## 2024-02-06 VITALS
TEMPERATURE: 97.9 F | HEART RATE: 87 BPM | SYSTOLIC BLOOD PRESSURE: 137 MMHG | BODY MASS INDEX: 30.55 KG/M2 | WEIGHT: 166 LBS | DIASTOLIC BLOOD PRESSURE: 94 MMHG | HEIGHT: 62 IN | OXYGEN SATURATION: 97 %

## 2024-02-06 DIAGNOSIS — E11.65 TYPE 2 DIABETES MELLITUS WITH HYPERGLYCEMIA, WITHOUT LONG-TERM CURRENT USE OF INSULIN: Primary | ICD-10-CM

## 2024-02-06 LAB
ALBUMIN UR-MCNC: 1.4 MG/DL
BASOPHILS # BLD AUTO: 0.05 10*3/MM3 (ref 0–0.2)
BASOPHILS NFR BLD AUTO: 0.5 % (ref 0–1.5)
DEPRECATED RDW RBC AUTO: 38.8 FL (ref 37–54)
EOSINOPHIL # BLD AUTO: 0.2 10*3/MM3 (ref 0–0.4)
EOSINOPHIL NFR BLD AUTO: 1.8 % (ref 0.3–6.2)
ERYTHROCYTE [DISTWIDTH] IN BLOOD BY AUTOMATED COUNT: 12.8 % (ref 12.3–15.4)
HBA1C MFR BLD: 13.7 % (ref 4.8–5.6)
HCT VFR BLD AUTO: 39.1 % (ref 34–46.6)
HGB BLD-MCNC: 13.5 G/DL (ref 12–15.9)
IMM GRANULOCYTES # BLD AUTO: 0.05 10*3/MM3 (ref 0–0.05)
IMM GRANULOCYTES NFR BLD AUTO: 0.5 % (ref 0–0.5)
LYMPHOCYTES # BLD AUTO: 4.08 10*3/MM3 (ref 0.7–3.1)
LYMPHOCYTES NFR BLD AUTO: 37.6 % (ref 19.6–45.3)
MCH RBC QN AUTO: 29.2 PG (ref 26.6–33)
MCHC RBC AUTO-ENTMCNC: 34.5 G/DL (ref 31.5–35.7)
MCV RBC AUTO: 84.4 FL (ref 79–97)
MONOCYTES # BLD AUTO: 0.6 10*3/MM3 (ref 0.1–0.9)
MONOCYTES NFR BLD AUTO: 5.5 % (ref 5–12)
NEUTROPHILS NFR BLD AUTO: 5.88 10*3/MM3 (ref 1.7–7)
NEUTROPHILS NFR BLD AUTO: 54.1 % (ref 42.7–76)
NRBC BLD AUTO-RTO: 0 /100 WBC (ref 0–0.2)
PLATELET # BLD AUTO: 319 10*3/MM3 (ref 140–450)
PMV BLD AUTO: 10.5 FL (ref 6–12)
RBC # BLD AUTO: 4.63 10*6/MM3 (ref 3.77–5.28)
WBC NRBC COR # BLD AUTO: 10.86 10*3/MM3 (ref 3.4–10.8)

## 2024-02-06 PROCEDURE — 82043 UR ALBUMIN QUANTITATIVE: CPT | Performed by: NURSE PRACTITIONER

## 2024-02-06 PROCEDURE — 85025 COMPLETE CBC W/AUTO DIFF WBC: CPT | Performed by: NURSE PRACTITIONER

## 2024-02-06 PROCEDURE — 83036 HEMOGLOBIN GLYCOSYLATED A1C: CPT | Performed by: NURSE PRACTITIONER

## 2024-02-06 PROCEDURE — 99214 OFFICE O/P EST MOD 30 MIN: CPT | Performed by: NURSE PRACTITIONER

## 2024-02-06 RX ORDER — ACYCLOVIR 400 MG/1
1 TABLET ORAL ONCE
Qty: 1 EACH | Refills: 0 | Status: SHIPPED | OUTPATIENT
Start: 2024-02-06 | End: 2024-02-06

## 2024-02-06 RX ORDER — ACYCLOVIR 400 MG/1
1 TABLET ORAL TAKE AS DIRECTED
Qty: 1 EACH | Refills: 2 | Status: SHIPPED | OUTPATIENT
Start: 2024-02-06

## 2024-02-06 NOTE — LETTER
[address] [facility] [address]  [place] [address]  [phone]       [facility]  IMMUNIZATION CERTIFICATE    (Required for each child enrolled in day care center, certified family  home, other licensed facility which cares for children,  programs, and public and private primary and secondary schools.)    Name of Child:  Amarilis Vinson  YOB: 2005   Name of Parent:  ______________________________  Address:  [address] [place] [address]     VACCINE/DOSE DATE DATE DATE DATE DATE   Hepatitis B [birth year]     Alt. Adult Hepatitis B¹        DTap/DTP/DT² 2003 2003 2003 3/3/2005 3/12/2007   Hib³ 2003 2003 2003 3/3/2005    Pneumococcal (PCV13) 2003 3/3/2005      Polio 2003 2003 2/25/2004 3/12/2007    Influenza 10/3/2019 9/28/2020      MMR 2/25/2004 3/12/2007      Varicella 2/25/2004 3/12/2007      Hepatitis A 10/5/2012 11/12/2013      Meningococcal 11/12/2013 4/30/2019      Td        Tdap 11/12/2013       Rotavirus        HPV 12/28/2015 2/29/2016 8/8/2016     Men B 4/30/2019 5/28/2019      Pneumococcal (PPSV23)          ¹ Alternative two dose series of approved adult hepatitis B vaccine for adolescents 11 through 15 years of age. ² DTaP, DTP, or DT. ³ Hib not required at 5 years of age or more.    Had Chickenpox or Zoster disease: No     This child is current for immunizations until  /  /  , (14 days after the next shot is due) after which this certificate is no longer valid, and a new certificate must be obtained.   This child is not up-to-date at this time.  This certificate is valid unti  /  /  ,l  (14 days after the next shot is due) after which this certificate is no longer valid, and a new certificate must be obtained.    Reason child is not up-to-date:   Provisional Status - Child is behind on required immunizations.   Medical Exemption - The following immunizations are not medically indicated:   ___________________                                      _______________________________________________________________________________       If Medical Exemption, can these vaccines be administered at a later date?  No:  _  Yes: _  Date: __/__/__    Mu-ism Objection  I CERTIFY THAT THE ABOVE NAMED CHILD HAS RECEIVED IMMUNIZATIONS AS STIPULATED ABOVE.     __________________________________________________________     Date: 2/6/2024   (Signature of physician, APRN, PA, pharmacist, LHD , RN or LPN designee)      This Certificate should be presented to the school or facility in which the child intends to enroll and should be retained by the school or facility and filed with the child's health record.

## 2024-02-06 NOTE — PROGRESS NOTES
"Chief Complaint  Med Management (Pt states she feels that her diabetes is not managed )    Subjective      Amarilis Vinson is a 18 year old female that presents to [facility] for medication management. She states her blood sugar has been out of control lately. She was previously controlled with diet and taken off of her medications.   She is wearing her dexcom now but it is giving her low numbers where finger sticks are 3- 400's. She states her dexom supplies are not .   She reports having lows where she feels like \"jello'\".   Currently only on ozempic .25mg.- feels like it works for a day and then blood sugar goes back up.   On metformin in the past and could not tolerate due to blood sugars dropping low. Issues with jardiance as well- states blood sugar was still very high.   Diet- states it has been the same.  Eating eggs and tomato soup mostly. Cannot eat a lot of foods currently due to wisdom teeth. Having surgery on the 12th.             History of Present Illness    Current Outpatient Medications   Medication Instructions    Alcohol Swabs pads 1 Pad, Does not apply, Daily    aspirin-acetaminophen-caffeine (EXCEDRIN MIGRAINE) 250-250-65 MG per tablet Oral    atorvastatin (LIPITOR) 20 mg, Oral, Nightly    Continuous Blood Gluc Sensor (Dexcom G7 Sensor) misc 1 Device, Does not apply, Take As Directed    Continuous Blood Gluc Transmit (Dexcom G6 Transmitter) misc 1 each, Does not apply, Take As Directed    escitalopram (LEXAPRO) 15 mg, Oral, Daily    glucose blood test strip Use three times per day as needed    hydroCHLOROthiazide (HYDRODIURIL) 25 mg, Oral, Daily    hydrOXYzine (ATARAX) 10 MG tablet Take 1-3 tab PO BID PRN anxiety    insulin glargine (LANTUS) 10 Units, Subcutaneous, Daily    Insulin Syringe 31G X \" 1 ML misc 1 syringe, Does not apply, Daily    Lancets (freestyle) lancets Use three times per day as needed to check blood sugars    " "lisinopril (PRINIVIL,ZESTRIL) 40 mg, Oral, Daily    meclizine (ANTIVERT) 25 mg, Oral, 3 Times Daily PRN    Norethin-Eth Estrad-Fe Biphas (Lo Loestrin Fe) 1 MG-10 MCG / 10 MCG tablet 1 tablet, Oral, Daily    ondansetron ODT (ZOFRAN-ODT) 4 mg, Translingual, Every 4 Hours PRN    Rimegepant Sulfate (NURTEC) 75 mg, Oral, Every 48 Hours PRN    Semaglutide(0.25 or 0.5MG/DOS) (OZEMPIC) 0.25 mg, Subcutaneous, Weekly       The following portions of the patient's history were reviewed and updated as appropriate: allergies, current medications, past family history, past medical history, past social history, past surgical history, and problem list.    Objective   Vital Signs:   /94   Pulse 87   Temp 97.9 °F (36.6 °C) (Temporal)   Ht 157.5 cm (62\")   Wt 75.3 kg (166 lb)   SpO2 97%   BMI 30.36 kg/m²     Wt Readings from Last 3 Encounters:   02/06/24 75.3 kg (166 lb)   01/05/24 78.5 kg (173 lb 1 oz)   09/12/23 78.9 kg (174 lb)     BP Readings from Last 3 Encounters:   02/06/24 137/94   01/05/24 128/96   09/12/23 129/86     Physical Exam  Vitals and nursing note reviewed.   Constitutional:       Appearance: Normal appearance. She is not ill-appearing.   HENT:      Head: Normocephalic and atraumatic.      Right Ear: External ear normal.      Left Ear: External ear normal.   Cardiovascular:      Rate and Rhythm: Normal rate and regular rhythm.      Heart sounds: Normal heart sounds.   Pulmonary:      Effort: Pulmonary effort is normal.      Breath sounds: Normal breath sounds.   Skin:     General: Skin is warm and dry.   Neurological:      Mental Status: She is alert and oriented to person, place, and time.   Psychiatric:         Mood and Affect: Mood normal.         Behavior: Behavior normal.          Result Review :  The following data was reviewed by: JOSE Grewal on 02/06/2024:      Common labs          8/22/2023    16:51 1/5/2024    16:11 2/6/2024    14:28   Common Labs   Glucose 326  377     BUN 17  10   "   Creatinine 0.97  0.76     Sodium 134  133     Potassium 4.4  4.7     Chloride 97  98     Calcium 9.8  9.8     Albumin 4.5  4.1     Total Bilirubin 0.2  0.2     Alkaline Phosphatase 131  137     AST (SGOT) 96  113     ALT (SGPT) 61  97     WBC 10.74  8.42  10.86    Hemoglobin 14.7  14.4  13.5    Hematocrit 45.2  41.6  39.1    Platelets 348  308  319    Hemoglobin A1C   13.70    Microalbumin, Urine   1.4        Lab Results (last 72 hours)       Procedure Component Value Units Date/Time    Hemoglobin A1c [[id number]]  (Abnormal) Collected: 02/06/24 1428    Specimen: Blood Updated: 02/06/24 2233     Hemoglobin A1C 13.70 %     Narrative:      Hemoglobin A1C Ranges:    Increased Risk for Diabetes  5.7% to 6.4%  Diabetes                     >= 6.5%  Diabetic Goal                < 7.0%    CBC w AUTO Differential [[id number]]  (Abnormal) Collected: 02/06/24 1428    Specimen: Blood Updated: 02/06/24 2215    Narrative:      The following orders were created for panel order CBC w AUTO Differential.  Procedure                               Abnormality         Status                     ---------                               -----------         ------                     CBC Auto Differential[[id number]]        Abnormal            Final result                 Please view results for these tests on the individual orders.    MicroAlbumin, Urine, Random - Urine, Clean Catch [[id number]] Collected: 02/06/24 1428    Specimen: Urine, Clean Catch Updated: 02/06/24 2311     Microalbumin, Urine 1.4 mg/dL     Narrative:      American Diabetes Association Definition of Microalbuminuria:    Normal                   Less than 30  Microalbuminuria           Clinical Microalbumuria  Greater than 300    CBC Auto Differential [[id number]]  (Abnormal) Collected: 02/06/24 1428    Specimen: Blood Updated: 02/06/24 2215     WBC 10.86 10*3/mm3      RBC 4.63 10*6/mm3      Hemoglobin 13.5 g/dL      Hematocrit 39.1 %      MCV 84.4 fL      MCH 29.2 pg       MCHC 34.5 g/dL      RDW 12.8 %      RDW-SD 38.8 fl      MPV 10.5 fL      Platelets 319 10*3/mm3      Neutrophil % 54.1 %      Lymphocyte % 37.6 %      Monocyte % 5.5 %      Eosinophil % 1.8 %      Basophil % 0.5 %      Immature Grans % 0.5 %      Neutrophils, Absolute 5.88 10*3/mm3      Lymphocytes, Absolute 4.08 10*3/mm3      Monocytes, Absolute 0.60 10*3/mm3      Eosinophils, Absolute 0.20 10*3/mm3      Basophils, Absolute 0.05 10*3/mm3      Immature Grans, Absolute 0.05 10*3/mm3      nRBC 0.0 /100 WBC                  Lab Results   Component Value Date    SARSANTIGEN Not Detected 01/18/2023    COVID19 NOT DETECTED 04/05/2021    RAPFLUA Negative 01/18/2023    RAPFLUB Negative 01/18/2023    POCPREGUR Negative 10/12/2022    BILIRUBINUR Negative 01/05/2024    POCGLU 437 (C) 01/05/2024       Procedures        Assessment and Plan   Diagnoses and all orders for this visit:    1. Type 2 diabetes mellitus with hyperglycemia, without long-term current use of insulin (Primary)  -     Hemoglobin A1c; Future  -     CBC w AUTO Differential; Future  -     MicroAlbumin, Urine, Random - Urine, Clean Catch; Future  -     Continuous Blood Gluc  (Dexcom G7 ) device; Use 1 Device 1 (One) Time for 1 dose.  Dispense: 1 each; Refill: 0  -     Continuous Blood Gluc Sensor (Dexcom G7 Sensor) misc; Use 1 Device Take As Directed.  Dispense: 1 each; Refill: 2  -     Hemoglobin A1c  -     CBC w AUTO Differential  -     MicroAlbumin, Urine, Random - Urine, Clean Catch      Check labs today and evaluate if additional medication is needed. If A1c is very elevated may send referral for diabetes management as well. Pt in agreement with plan.            Medications Discontinued During This Encounter   Medication Reason    Continuous Blood Gluc  (Dexcom G6 ) device Other- See Medication Note    Continuous Blood Gluc Sensor (Dexcom G6 Sensor) Other- See Medication Note          Follow Up   No follow-ups on  file.  Patient was given instructions and counseling regarding her condition or for health maintenance advice. Please see specific information pulled into the AVS if appropriate.       Jessica Russell, JOSE  02/07/24  19:12 EST

## 2024-02-07 ENCOUNTER — TELEPHONE (OUTPATIENT)
Dept: INTERNAL MEDICINE | Facility: CLINIC | Age: 19
End: 2024-02-07
Payer: COMMERCIAL

## 2024-02-07 ENCOUNTER — PRIOR AUTHORIZATION (OUTPATIENT)
Dept: INTERNAL MEDICINE | Facility: CLINIC | Age: 19
End: 2024-02-07
Payer: COMMERCIAL

## 2024-02-07 DIAGNOSIS — E11.65 TYPE 2 DIABETES MELLITUS WITH HYPERGLYCEMIA, WITHOUT LONG-TERM CURRENT USE OF INSULIN: Primary | ICD-10-CM

## 2024-02-07 RX ORDER — BLOOD PRESSURE TEST KIT
1 KIT MISCELLANEOUS DAILY
Qty: 100 EACH | Refills: 1 | Status: SHIPPED | OUTPATIENT
Start: 2024-02-07

## 2024-02-07 RX ORDER — INSULIN GLARGINE 100 [IU]/ML
10 INJECTION, SOLUTION SUBCUTANEOUS DAILY
Qty: 3 ML | Refills: 12 | Status: SHIPPED | OUTPATIENT
Start: 2024-02-07

## 2024-02-07 RX ORDER — CALCIUM CARB/VITAMIN D3/VIT K1 500-100-40
1 TABLET,CHEWABLE ORAL DAILY
Qty: 100 EACH | Refills: 1 | Status: SHIPPED | OUTPATIENT
Start: 2024-02-07

## 2024-02-07 NOTE — TELEPHONE ENCOUNTER
Attempted to contact patient. Could not leave message due to no room on voicemail.    HUB OK TO READ/ADVISE:  A1c is greatly elevated at 13.7.  I would like to start lantus at 10 units once daily and then increase the dose by 3 units every 3 days until blood sugars are less than 150. I am also placing a referral for diabetes management. Follow up in 1 month

## 2024-02-07 NOTE — TELEPHONE ENCOUNTER
----- Message from JOSE Grewal sent at 2/7/2024  9:51 AM EST -----  A1c is greatly elevated at 13.7.  I would like to start lantus at 10 units once daily and then increase the dose by 3 units every 3 days until blood sugars are less than 150. I am also placing a referral for diabetes management. Follow up in 1 month.

## 2024-02-07 NOTE — TELEPHONE ENCOUNTER
PA REQUIRED:    Continuous Blood Gluc Sensor (Dexcom G7 Sensor) misc (02/06/2024)     Continuous Blood Gluc  (Dexcom G7 ) device (02/06/2024)     INDEXED VIA [employer]

## 2024-02-07 NOTE — TELEPHONE ENCOUNTER
[facility] PROVIDED THE RELAY MESSAGE FROM THE OFFICE   PATIENT VOICED UNDERSTANDING AND HAS NO FURTHER QUESTIONS AT THIS TIME            Thania Diaz    2/7/24 12:44 PM  Note      Attempted to contact patient. Could not leave message due to no room on voicemail.     HUB OK TO READ/ADVISE:  A1c is greatly elevated at 13.7.  I would like to start lantus at 10 units once daily and then increase the dose by 3 units every 3 days until blood sugars are less than 150. I am also placing a referral for diabetes management. Follow up in 1 month

## 2024-02-26 NOTE — PROGRESS NOTES
"Chief Complaint  Diabetes and Flu Vaccine    Subjective          Amarilis Vinson presents to [facility]  History of Present Illness  Previous PCP: JOSE Lechuga  Specialist(s): None  COVID vaccine: No  Pneumonia vaccine: No  Shingles vaccine: Not sure   Pap Smear: Never    DM2- patient would like to get under control. Patient is on 10U insulin and unsure if it is helping. Patient reports diet is largely unchanged. Patient reports ozempic helped her lose weight. Previously intolerant to metformin. Patient did not think jardiance helped. Patient previously on dexcom, but not currently. Patient reports her sugars are steadily in 200s and sometimes 300.   Hypertension- patient diagnosed at 11yo. She forgot her medication this morning. Patient denies headaches, dizziness, chest pain.   Hyperlipidemia- doing well on statin  Patient also concerned with birth control method. She had menstrual cycle for 3 months straight. It is now more regular, but periods are heavy.     Current Outpatient Medications   Medication Instructions    Alcohol Swabs pads 1 Pad, Does not apply, Daily    aspirin-acetaminophen-caffeine (EXCEDRIN MIGRAINE) 250-250-65 MG per tablet Oral    atorvastatin (LIPITOR) 20 mg, Oral, Nightly    Continuous Blood Gluc  (Dexcom G7 ) device     Continuous Blood Gluc Sensor (Dexcom G7 Sensor) misc 1 Device, Does not apply, Take As Directed    Continuous Blood Gluc Transmit (Dexcom G6 Transmitter) misc 1 each, Does not apply, Take As Directed    dapagliflozin Propanediol (FARXIGA) 10 mg, Oral, Daily    escitalopram (LEXAPRO) 15 mg, Oral, Daily    glucose blood test strip Use three times per day as needed    hydroCHLOROthiazide 25 mg, Oral, Daily    hydrOXYzine (ATARAX) 10 MG tablet Take 1-3 tab PO BID PRN anxiety    insulin glargine (LANTUS) 20 Units, Subcutaneous, Daily    Insulin Syringe 31G X 5/16\" 1 ML misc 1 syringe, Does not apply, " "Daily    Lancets (freestyle) lancets Use three times per day as needed to check blood sugars    lisinopril (PRINIVIL,ZESTRIL) 40 mg, Oral, Daily    meclizine (ANTIVERT) 25 mg, Oral, 3 Times Daily PRN    Norethin-Eth Estrad-Fe Biphas (Lo Loestrin Fe) 1 MG-10 MCG / 10 MCG tablet 1 tablet, Oral, Daily    ondansetron ODT (ZOFRAN-ODT) 4 mg, Translingual, Every 4 Hours PRN    Rimegepant Sulfate (NURTEC) 75 mg, Oral, Every 48 Hours PRN    Semaglutide(0.25 or 0.5MG/DOS) (OZEMPIC) 0.25 mg, Subcutaneous, Weekly    sertraline (ZOLOFT) 100 MG tablet 1 tablet, Oral, Daily       The following portions of the patient's history were reviewed and updated as appropriate: allergies, current medications, past family history, past medical history, past social history, past surgical history, and problem list.      Objective   Vital Signs:   /98 (BP Location: Left arm, Patient Position: Sitting, Cuff Size: Adult)   Pulse 78   Temp 97.5 °F (36.4 °C) (Temporal)   Ht 157.5 cm (62\")   Wt 77 kg (169 lb 12.8 oz)   SpO2 95%   BMI 31.06 kg/m²     BP Readings from Last 3 Encounters:   02/27/24 140/98   02/06/24 137/94   01/05/24 128/96     Wt Readings from Last 3 Encounters:   02/27/24 77 kg (169 lb 12.8 oz)   02/06/24 75.3 kg (166 lb)   01/05/24 78.5 kg (173 lb 1 oz)         Physical Exam   Appearance: No acute distress, well-nourished  Head: normocephalic, atraumatic  Eyes: extraocular movements intact, no scleral icterus, no conjunctival injection  Ears, Nose, and Throat: external ears normal, nares patent, moist mucous membranes  Cardiovascular: regular rate and rhythm. no murmurs, rubs, or gallops. no edema  Respiratory: breathing comfortably, symmetric chest rise, clear to auscultation bilaterally. No wheezes, rales, or rhonchi.  Neuro: alert and oriented to time, place, and person. Normal gait  Psych: normal mood and affect     Result Review :   The following data was reviewed by: Chidi Hobson Jr, MD on " 02/27/2024:  Common labs          8/22/2023    16:51 1/5/2024    16:11 2/6/2024    14:28   Common Labs   Glucose 326  377     BUN 17  10     Creatinine 0.97  0.76     Sodium 134  133     Potassium 4.4  4.7     Chloride 97  98     Calcium 9.8  9.8     Albumin 4.5  4.1     Total Bilirubin 0.2  0.2     Alkaline Phosphatase 131  137     AST (SGOT) 96  113     ALT (SGPT) 61  97     WBC 10.74  8.42  10.86    Hemoglobin 14.7  14.4  13.5    Hematocrit 45.2  41.6  39.1    Platelets 348  308  319    Hemoglobin A1C   13.70    Microalbumin, Urine   1.4        Lab Results   Component Value Date    SARSANTIGEN Not Detected 01/18/2023    COVID19 NOT DETECTED 04/05/2021    RAPFLUA Negative 01/18/2023    RAPFLUB Negative 01/18/2023    POCPREGUR Negative 10/12/2022    BILIRUBINUR Negative 01/05/2024          Assessment and Plan    Diagnoses and all orders for this visit:    1. Primary hypertension (Primary)  Comments:  elevated today but hope for improvement with improved BG control and restart of farxiga.    2. Type 2 diabetes mellitus with hyperglycemia, without long-term current use of insulin  Comments:  restart ozempic and farxiga. inc lantus to 20U daily. f/u in 2-3 weeks for repeat eval.  cont check BG regularly with CGM.  Orders:  -     insulin glargine (Lantus) 100 UNIT/ML injection; Inject 20 Units under the skin into the appropriate area as directed Daily.  Dispense: 3 mL; Refill: 12  -     Semaglutide,0.25 or 0.5MG/DOS, (OZEMPIC) 2 MG/1.5ML solution pen-injector; Inject 0.25 mg under the skin into the appropriate area as directed 1 (One) Time Per Week.  Dispense: 1.5 mL; Refill: 0  -     dapagliflozin Propanediol (Farxiga) 10 MG tablet; Take 10 mg by mouth Daily.  Dispense: 90 tablet; Refill: 3  -     atorvastatin (LIPITOR) 20 MG tablet; Take 1 tablet by mouth Every Night.  Dispense: 90 tablet; Refill: 1  -     Ambulatory Referral to Case Management Disease Education  -     Ambulatory Referral to Diabetic Education  -      Ambulatory Referral for Diabetic Eye Exam-Ophthalmology    3. Need for pneumococcal vaccination  -     Pneumococcal Conjugate Vaccine 20-Valent All    4. Need for COVID-19 vaccine  Comments:  pt defers today    5. Need for tetanus booster  -     Tdap Vaccine Greater Than or Equal To 6yo IM    6. Need for influenza vaccination  -     Fluzone (or Fluarix & Flulaval for VFC) >6mos    7. Dysmenorrhea  Comments:  cont current regimen for now as we work to improve BG and BP. recheck in 2-3 weeks.        Medications Discontinued During This Encounter   Medication Reason    atorvastatin (LIPITOR) 20 MG tablet Reorder    Semaglutide,0.25 or 0.5MG/DOS, (OZEMPIC) 2 MG/1.5ML solution pen-injector Reorder    insulin glargine (Lantus) 100 UNIT/ML injection Reorder          Follow Up   Return in about 2 weeks (around 3/12/2024) for Recheck.  Patient was given instructions and counseling regarding her condition or for health maintenance advice. Please see specific information pulled into the AVS if appropriate.       Chidi Hobson Jr, MD  02/27/24  12:56 EST

## 2024-02-27 ENCOUNTER — OFFICE VISIT (OUTPATIENT)
Dept: INTERNAL MEDICINE | Facility: CLINIC | Age: 19
End: 2024-02-27
Payer: COMMERCIAL

## 2024-02-27 ENCOUNTER — PATIENT ROUNDING (BHMG ONLY) (OUTPATIENT)
Dept: INTERNAL MEDICINE | Facility: CLINIC | Age: 19
End: 2024-02-27
Payer: COMMERCIAL

## 2024-02-27 ENCOUNTER — REFERRAL TRIAGE (OUTPATIENT)
Dept: CASE MANAGEMENT | Facility: OTHER | Age: 19
End: 2024-02-27
Payer: COMMERCIAL

## 2024-02-27 VITALS
WEIGHT: 169.8 LBS | SYSTOLIC BLOOD PRESSURE: 140 MMHG | BODY MASS INDEX: 31.25 KG/M2 | OXYGEN SATURATION: 95 % | HEIGHT: 62 IN | HEART RATE: 78 BPM | DIASTOLIC BLOOD PRESSURE: 98 MMHG | TEMPERATURE: 97.5 F

## 2024-02-27 DIAGNOSIS — Z23 NEED FOR TETANUS BOOSTER: ICD-10-CM

## 2024-02-27 DIAGNOSIS — Z23 NEED FOR INFLUENZA VACCINATION: ICD-10-CM

## 2024-02-27 DIAGNOSIS — I10 PRIMARY HYPERTENSION: Primary | ICD-10-CM

## 2024-02-27 DIAGNOSIS — E11.65 TYPE 2 DIABETES MELLITUS WITH HYPERGLYCEMIA, WITHOUT LONG-TERM CURRENT USE OF INSULIN: ICD-10-CM

## 2024-02-27 DIAGNOSIS — Z23 NEED FOR COVID-19 VACCINE: ICD-10-CM

## 2024-02-27 DIAGNOSIS — N94.6 DYSMENORRHEA: ICD-10-CM

## 2024-02-27 DIAGNOSIS — Z23 NEED FOR PNEUMOCOCCAL VACCINATION: ICD-10-CM

## 2024-02-27 RX ORDER — SERTRALINE HYDROCHLORIDE 100 MG/1
1 TABLET, FILM COATED ORAL DAILY
COMMUNITY
Start: 2023-11-01

## 2024-02-27 RX ORDER — INSULIN GLARGINE 100 [IU]/ML
20 INJECTION, SOLUTION SUBCUTANEOUS DAILY
Qty: 3 ML | Refills: 12 | Status: SHIPPED | OUTPATIENT
Start: 2024-02-27

## 2024-02-27 RX ORDER — ACYCLOVIR 400 MG/1
TABLET ORAL
COMMUNITY
Start: 2024-02-17

## 2024-02-27 RX ORDER — ATORVASTATIN CALCIUM 20 MG/1
20 TABLET, FILM COATED ORAL NIGHTLY
Qty: 90 TABLET | Refills: 1 | Status: SHIPPED | OUTPATIENT
Start: 2024-02-27

## 2024-02-27 NOTE — LETTER
[facility]  Vaccine Consent Form    Patient Name:  Amarilis Vinson  Patient :  2005     Vaccine(s) Ordered    Pneumococcal Conjugate Vaccine 20-Valent All  Tdap Vaccine Greater Than or Equal To 8yo IM  Fluzone (or Fluarix & Flulaval for VFC) >6mos        Screening Checklist  The following questions should be completed prior to vaccination. If you answer “yes” to any question, it does not necessarily mean you should not be vaccinated. It just means we may need to clarify or ask more questions. If a question is unclear, please ask your healthcare provider to explain it.    Yes No   Any fever or moderate to severe illness today (mild illness and/or antibiotic treatment are not contraindications)?     Do you have a history of a serious reaction to any previous vaccinations, such as anaphylaxis, encephalopathy within 7 days, Guillain-[place] syndrome within 6 weeks, seizure?     Have you received any live vaccine(s) (e.g MMR, HARSHAL) or any other vaccines in the last month (to ensure duplicate doses aren't given)?     Do you have an anaphylactic allergy to latex (DTaP, DTaP-IPV, Hep A, Hep B, MenB, RV, Td, Tdap), baker’s yeast (Hep B, HPV), polysorbates (RSV, nirsevimab, PCV 20, Rotavirrus, Tdap, Shingrix), or gelatin (HARSHAL, MMR)?     Do you have an anaphylactic allergy to neomycin (Rabies, HARSHAL, MMR, IPV, Hep A), polymyxin B (IPV), or streptomycin (IPV)?      Any cancer, leukemia, AIDS, or other immune system disorder? (HARSHAL, MMR, RV)     Do you have a parent, brother, or sister with an immune system problem (if immune competence of vaccine recipient clinically verified, can proceed)? (MMR, HARSHAL)     Any recent steroid treatments for >2 weeks, chemotherapy, or radiation treatment? (HARSHAL, MMR)     Have you received antibody-containing blood transfusions or IVIG in the past 11 months (recommended interval is dependent on product)? (MMR, HARSHAL)     Have you taken antiviral drugs (acyclovir, famciclovir, valacyclovir for  "HARSHAL) in the last 24 or 48 hours, respectively?      Are you pregnant or planning to become pregnant within 1 month? (HARSHAL, MMR, HPV, IPV, MenB, Abrexvy; For Hep B- refer to Engerix-B; For RSV - Abrysvo is indicated for 32-36 weeks of pregnancy from September to January)     For infants, have you ever been told your child has had intussusception or a medical emergency involving obstruction of the intestine (Rotavirus)? If not for an infant, can skip this question.         *Ordering Physicians/APC should be consulted if \"yes\" is checked by the patient or guardian above.  I have received, read, and understand the Vaccine Information Statement (VIS) for each vaccine ordered.  I have considered my or my child's health status as well as the health status of my close contacts.  I have taken the opportunity to discuss my vaccine questions with my or my child's health care provider.   I have requested that the ordered vaccine(s) be given to me or my child.  I understand the benefits and risks of the vaccines.  I understand that I should remain in the clinic for 15 minutes after receiving the vaccine(s).  _________________________________________________________  Signature of Patient or Parent/Legal Guardian ____________________  Date     "

## 2024-03-01 ENCOUNTER — PATIENT OUTREACH (OUTPATIENT)
Dept: CASE MANAGEMENT | Facility: OTHER | Age: 19
End: 2024-03-01
Payer: COMMERCIAL

## 2024-03-01 ENCOUNTER — TELEPHONE (OUTPATIENT)
Dept: CASE MANAGEMENT | Facility: OTHER | Age: 19
End: 2024-03-01
Payer: COMMERCIAL

## 2024-03-01 DIAGNOSIS — E11.65 TYPE 2 DIABETES MELLITUS WITH HYPERGLYCEMIA, WITHOUT LONG-TERM CURRENT USE OF INSULIN: ICD-10-CM

## 2024-03-01 NOTE — TELEPHONE ENCOUNTER
"Patient is in High Risk Case Management because she cannot afford copay for CCM. She needs to inform:    1) She is not taking Farxiga because there is a liver warning and her liver enzymes are elevated (last  &   ALT 97  on 1/5/24 and reports she was told \"You have a liver of an alcoholic\" but she does not drink    2) Can she increase her Ozempic from 0.25 mg to 0.5mg then taper up to 1 mg? Her blood sugars are still running highs.    3) She was still doing 10 units of Lantus and will go up to the 20 units. Was unaware to increase.    She is not going to see JOSE Miller because she owes [facility] money right now she does not want to get any more in hole. I did put in a referral for our  to support on this.     TY  "

## 2024-03-01 NOTE — TELEPHONE ENCOUNTER
1) The liver warning is likely related to fatty liver that is commonly associated with diabetes. The farxiga more than likely will actually help protect her liver.     2) yes we can increase ozempic stepwise. I would ideally like to keep using increasing doses of it instead of her insulin in the future. Typically would take each dose for approx 4 weeks before moving up.     3) still would increase lantus to 20U daily. Will hopefully be able to bring this down in the near future.

## 2024-03-01 NOTE — PROGRESS NOTES
".\"A [address] message has been sent to the patient for PATIENT ROUNDING with [facility]\"  "

## 2024-03-04 ENCOUNTER — PRIOR AUTHORIZATION (OUTPATIENT)
Dept: INTERNAL MEDICINE | Facility: CLINIC | Age: 19
End: 2024-03-04
Payer: COMMERCIAL

## 2024-03-04 ENCOUNTER — PATIENT OUTREACH (OUTPATIENT)
Dept: CASE MANAGEMENT | Facility: OTHER | Age: 19
End: 2024-03-04
Payer: COMMERCIAL

## 2024-03-04 NOTE — OUTREACH NOTE
AMBULATORY CASE MANAGEMENT NOTE    Name and Relationship of Patient/Support Person: Amarilis Vinson - Self    Care Coordination    Message back from PCP about medicztion questions and responses noted.     Patient Outreach    Outreached to patient and she is aware of new plan and reports she will start Farxiga, increase her Ozempic to 0.5 mg this week for 4 weeks then go to 1 mg dose. She reports her blood sugars are normal but unable to give readings, encouraged her to keep a log and will follow up with that and her blood sugar readings after her visit with PCP 3/12/24 and reported to call  nurse for any needs in the mean time. PCP updated via Epic chat and telephone note to change medication dose in computer to PCP.         Stacie ROSS  Ambulatory Case Management    3/4/2024, 10:13 EST

## 2024-03-04 NOTE — TELEPHONE ENCOUNTER
PA REQUIRED:    Semaglutide,0.25 or 0.5MG/DOS, (OZEMPIC) 2 MG/1.5ML solution pen-injector (02/27/2024)     INDEXED VIA [employer]

## 2024-03-04 NOTE — TELEPHONE ENCOUNTER
"Patient aware of all discussed below. She will start the Farxiga now that she understands the fatty liver and lowering her blood sugars will improve her labs and how Farxiga can overall protect her liver.     She will Increase her Ozempic to 0.5 mg this week and get the 1 mg samples from your office when she returns in the next few weeks for follow up. She was instructed to do 0.5 mg dose for 4 weeks then titrate up to 1 mg for 4 weeks, I am adjusting her medication list to reflect for today.    She is aware to continue the 20 Units of Lantus for now and the overall plan to eventually come off this once Ozempic is titrated up.     Questioned about her blood pressure reading: she reports the \"are better\" but unable to give any actual readings. Will address again at next outreach in a few weeks.     TY  "

## 2024-03-07 ENCOUNTER — TELEPHONE (OUTPATIENT)
Dept: INTERNAL MEDICINE | Facility: CLINIC | Age: 19
End: 2024-03-07
Payer: COMMERCIAL

## 2024-03-07 DIAGNOSIS — E11.65 TYPE 2 DIABETES MELLITUS WITH HYPERGLYCEMIA, WITHOUT LONG-TERM CURRENT USE OF INSULIN: Chronic | ICD-10-CM

## 2024-03-07 RX ORDER — PROCHLORPERAZINE 25 MG/1
1 SUPPOSITORY RECTAL TAKE AS DIRECTED
Qty: 2 EACH | Refills: 3 | Status: SHIPPED | OUTPATIENT
Start: 2024-03-07

## 2024-03-07 NOTE — TELEPHONE ENCOUNTER
Caller: Amarilis Vinson    Relationship: Self    Best call back number: [phone]    Requested Prescriptions:   TRANSMITTER    Pharmacy where request should be sent: [id number] [place] [place] - [address] AT [facility] [phone] [facility] [phone] FX     Last office visit with prescribing clinician: 2/27/2024   Last telemedicine visit with prescribing clinician: Visit date not found   Next office visit with prescribing clinician: 3/26/2024     Additional details provided by patient: PATIENT DID NOT RECEIVE THE TRANSMITTER TO CHECK SUGAR      Socorro Logan Rep   03/07/24 11:35 EST

## 2024-03-11 ENCOUNTER — PATIENT OUTREACH (OUTPATIENT)
Age: 19
End: 2024-03-11
Payer: COMMERCIAL

## 2024-03-11 NOTE — OUTREACH NOTE
Social Work Assessment  Questions/Answers      Flowsheet Row Most Recent Value   Referral Source nursing, outpatient staff, outpatient clinic   Reason for Consult community resources, other (see comments), insurance concerns, financial concerns  [food insecurity]   Preferred Language English   Advance Care Planning Reviewed no concerns identified   People in Home significant other   Current Living Arrangements home   Potentially Unsafe Housing Conditions none   In the past 12 months has the electric, gas, oil, or water company threatened to shut off services in your home? No   Primary Care Provided by self   Quality of Family Relationships helpful, supportive, involved   Employment Status unemployed   Source of Income none, other (see comments)  [states  makes about $300 per week]   Application for Public Assistance pending public assistance pending number   Usual Activity Tolerance good   Current Activity Tolerance good   Medications independent   Meal Preparation independent   Housekeeping independent   Laundry independent   Shopping independent          SDOH updated and reviewed with the patient during this program:  --      Employment      Financial Resource Strain: High Risk (3/1/2024)    Overall Financial Resource Strain (CARDIA)     Difficulty of Paying Living Expenses: Very hard      --     Food Insecurity: Food Insecurity Present (3/1/2024)    Hunger Vital Sign     Worried About Running Out of Food in the Last Year: Often true     Ran Out of Food in the Last Year: Often true      --     Housing Stability: Not At Risk (3/11/2024)    Housing Stability     Current Living Arrangements: home     Potentially Unsafe Housing Conditions: none   Recent Concern: Housing Stability - High Risk (3/1/2024)    Housing Stability Vital Sign     Unable to Pay for Housing in the Last Year: Yes     Number of Places Lived in the Last Year: 7     Unstable Housing in the Last Year: No      --     Transportation Needs: Unmet  Transportation Needs (3/1/2024)    PRAPARE - Transportation     Lack of Transportation (Medical): Yes     Lack of Transportation (Non-Medical): Yes      --     Utilities: Not At Risk (3/11/2024)    [facility] Utilities     Threatened with loss of utilities: No      Continuing Care   [facility] [place] [address], [place] [address]    Phone: [phone]    Resource for: Food Insecurity     Patient Outreach    MSW outreach to patient to discuss community resource needs as requested per RN-ACM. MSW provided patient with resources through [facility] for local food pantries available and Supplemental Nutritional Assistance Program (SNAP) resources available for the cost of food. Patient unsure if she will apply for SNAP due to changing situation with her husbands employment. MSW and patient also discussed Medicaid benefits as additional coverage and applying for [facility] Financial Assistance program. MSW to send patient requested resources via e-mail as requested.      Luz Maria MCCULLOUGH -   Ambulatory Case Management    3/11/2024, 16:30 EDT

## 2024-03-15 ENCOUNTER — PATIENT OUTREACH (OUTPATIENT)
Dept: CASE MANAGEMENT | Facility: OTHER | Age: 19
End: 2024-03-15
Payer: COMMERCIAL

## 2024-03-15 DIAGNOSIS — E11.65 TYPE 2 DIABETES MELLITUS WITH HYPERGLYCEMIA, WITHOUT LONG-TERM CURRENT USE OF INSULIN: ICD-10-CM

## 2024-03-15 DIAGNOSIS — E11.65 TYPE 2 DIABETES MELLITUS WITH HYPERGLYCEMIA, WITHOUT LONG-TERM CURRENT USE OF INSULIN: Chronic | ICD-10-CM

## 2024-03-15 RX ORDER — ACYCLOVIR 400 MG/1
1 TABLET ORAL TAKE AS DIRECTED
Start: 2024-03-15

## 2024-03-15 RX ORDER — ACYCLOVIR 400 MG/1
1 TABLET ORAL AS NEEDED
COMMUNITY

## 2024-03-15 NOTE — OUTREACH NOTE
AMBULATORY CASE MANAGEMENT NOTE    Name and Relationship of Patient/Support Person: Amarilis Vinson A - Self    Patient Outreach    Per call with paint, she has not had time to get in touch with financial support with [facility] yet. Her  does have his ship out date of 4/27/24 and they will both transition to  insurance at that time. She reports needing Farxgia refill, reported to her it was sent to pharmacy with 3 refills. She needs refills on her Dexcom G7 transmitter, noted PCP sent Dexcom 6 in at last visit, will call pharmacy to clarify. She reports she has not increased to her Ozempic 0.5 mg dose yet. Aware it is at pharmacy for her as well.     Care Coordination    Called [facility] and spoke with Sidney in pharmacy and he reports they do have a G7 sensor on file from a previous script and she has refill on that, he updated her Ozempic to the 0.5mg dose weekly x 1 month with no refill and he clarifies the Farxiga is on file with refills. Telephone note to PCP to update.         Stacie ROSS  Ambulatory Case Management    3/15/2024, 13:59 EDT

## 2024-03-15 NOTE — TELEPHONE ENCOUNTER
Called [facility] to updated pharmacy on current medications and Dexcom and she is ready for refill on her Dexcom 7  and she had Dexcom 6 on file but updated per Sidney at pharmacy for verbal orders but she had a 7 on file so he updated that and he is aware she is to up her dose to Ozempic 0.5 mg weekly and she has refills for her Farxiga and patient aware to go to pharmacy. Nothing needs to be actually sent in he just had Dexcom 6 instead of 7 on file.     Please sign off on her most current updated medication list.     TY

## 2024-03-25 ENCOUNTER — PRIOR AUTHORIZATION (OUTPATIENT)
Dept: INTERNAL MEDICINE | Facility: CLINIC | Age: 19
End: 2024-03-25
Payer: COMMERCIAL

## 2024-03-25 NOTE — TELEPHONE ENCOUNTER
PA REQUIRED:    dapagliflozin Propanediol (Farxiga) 10 MG tablet (02/27/2024)     INDEXED VIA ONBASE

## 2024-03-26 ENCOUNTER — OFFICE VISIT (OUTPATIENT)
Dept: INTERNAL MEDICINE | Facility: CLINIC | Age: 19
End: 2024-03-26
Payer: COMMERCIAL

## 2024-03-26 VITALS
TEMPERATURE: 98.1 F | HEIGHT: 62 IN | BODY MASS INDEX: 30.73 KG/M2 | HEART RATE: 96 BPM | DIASTOLIC BLOOD PRESSURE: 112 MMHG | OXYGEN SATURATION: 97 % | WEIGHT: 167 LBS | SYSTOLIC BLOOD PRESSURE: 155 MMHG

## 2024-03-26 DIAGNOSIS — I10 PRIMARY HYPERTENSION: ICD-10-CM

## 2024-03-26 DIAGNOSIS — E78.1 HYPERTRIGLYCERIDEMIA: ICD-10-CM

## 2024-03-26 DIAGNOSIS — E11.65 TYPE 2 DIABETES MELLITUS WITH HYPERGLYCEMIA, WITHOUT LONG-TERM CURRENT USE OF INSULIN: Primary | ICD-10-CM

## 2024-03-26 DIAGNOSIS — F41.9 ANXIETY: ICD-10-CM

## 2024-03-26 PROCEDURE — 99214 OFFICE O/P EST MOD 30 MIN: CPT | Performed by: INTERNAL MEDICINE

## 2024-03-26 RX ORDER — INSULIN GLARGINE 100 [IU]/ML
25 INJECTION, SOLUTION SUBCUTANEOUS DAILY
Qty: 15 ML | Refills: 1 | Status: SHIPPED | OUTPATIENT
Start: 2024-03-26

## 2024-03-26 NOTE — PROGRESS NOTES
"Chief Complaint  Hypertension and Med Management (Ozempic mad her sick, nausea/headache/weakness./She also wanted to know if she can get more samples for farxiga and needs refill on dexcom. /Pt asked if there is a cheaper alternative for her insulin.)    Subjective          Amarilis Vinson presents to [facility]  History of Present Illness  DM2- patient reports the ozempic made her nauseous, maliase, HA. Patient has been doing well with farxiga. Patient reports blood glucose have reduced overall. Her blood glucose avg has been 199 over 14 days. Her highest blood glucose reading is typically at dinner times. Patient has been taking lantus 20U nightly. Patient without hypoglycemic events.   Hyperlipidemia- doing well with statin.   Hypertension- patient reports she forgot her medication this morning.       Current Outpatient Medications   Medication Instructions    Alcohol Swabs pads 1 Pad, Does not apply, Daily    aspirin-acetaminophen-caffeine (EXCEDRIN MIGRAINE) 250-250-65 MG per tablet Oral    atorvastatin (LIPITOR) 20 mg, Oral, Nightly    Continuous Blood Gluc  (Dexcom G7 ) device 1 each, Does not apply, As Needed    Continuous Blood Gluc Sensor (Dexcom G7 Sensor) misc 1 Device, Does not apply, Take As Directed    dapagliflozin Propanediol (FARXIGA) 10 mg, Oral, Daily    glucose blood test strip Use three times per day as needed    hydroCHLOROthiazide 25 mg, Oral, Daily    hydrOXYzine (ATARAX) 10 MG tablet Take 1-3 tab PO BID PRN anxiety    insulin glargine (LANTUS) 25 Units, Subcutaneous, Daily    Insulin Syringe 31G X 5/16\" 1 ML misc 1 syringe, Does not apply, Daily    Lancets (freestyle) lancets Use three times per day as needed to check blood sugars    lisinopril (PRINIVIL,ZESTRIL) 40 mg, Oral, Daily    meclizine (ANTIVERT) 25 mg, Oral, 3 Times Daily PRN    Norethin-Eth Estrad-Fe Biphas (Lo Loestrin Fe) 1 MG-10 MCG / 10 MCG tablet 1 tablet, Oral, " "Daily    ondansetron ODT (ZOFRAN-ODT) 4 mg, Translingual, Every 4 Hours PRN    Rimegepant Sulfate (NURTEC) 75 mg, Oral, Every 48 Hours PRN    Semaglutide(0.25 or 0.5MG/DOS) (OZEMPIC) 0.5 mg, Subcutaneous, Weekly, Called to Sidney at pharmacy to update dose, patient is getting samples at office at this time.    sertraline (ZOLOFT) 100 MG tablet 1 tablet, Oral, Daily       The following portions of the patient's history were reviewed and updated as appropriate: allergies, current medications, past family history, past medical history, past social history, past surgical history, and problem list.    Objective   Vital Signs:   BP (!) 155/112 (BP Location: Right arm, Patient Position: Sitting, Cuff Size: Adult)   Pulse 96   Temp 98.1 °F (36.7 °C) (Temporal)   Ht 157.5 cm (62\")   Wt 75.8 kg (167 lb)   SpO2 97%   BMI 30.54 kg/m²     BP Readings from Last 3 Encounters:   03/26/24 (!) 155/112   02/27/24 140/98   02/06/24 137/94     Wt Readings from Last 3 Encounters:   03/26/24 75.8 kg (167 lb)   02/27/24 77 kg (169 lb 12.8 oz)   02/06/24 75.3 kg (166 lb)       Physical Exam   Appearance: No acute distress, well-nourished  Head: normocephalic, atraumatic  Eyes: extraocular movements intact, no scleral icterus, no conjunctival injection  Ears, Nose, and Throat: external ears normal, nares patent, moist mucous membranes  Cardiovascular: regular rate. no edema  Respiratory: breathing comfortably, symmetric chest rise  Neuro: alert and oriented to time, place, and person. Normal gait  Psych: normal mood and affect       Result Review :   The following data was reviewed by: Chidi Hobson Jr, MD on 03/26/2024:  Common labs          8/22/2023    16:51 1/5/2024    16:11 2/6/2024    14:28   Common Labs   Glucose 326  377     BUN 17  10     Creatinine 0.97  0.76     Sodium 134  133     Potassium 4.4  4.7     Chloride 97  98     Calcium 9.8  9.8     Albumin 4.5  4.1     Total Bilirubin 0.2  0.2     Alkaline Phosphatase " 131  137     AST (SGOT) 96  113     ALT (SGPT) 61  97     WBC 10.74  8.42  10.86    Hemoglobin 14.7  14.4  13.5    Hematocrit 45.2  41.6  39.1    Platelets 348  308  319    Hemoglobin A1C   13.70    Microalbumin, Urine   1.4        Lab Results   Component Value Date    SARSANTIGEN Not Detected 01/18/2023    COVID19 NOT DETECTED 04/05/2021    RAPFLUA Negative 01/18/2023    RAPFLUB Negative 01/18/2023    POCPREGUR Negative 10/12/2022    BILIRUBINUR Negative 01/05/2024        Assessment and Plan    Diagnoses and all orders for this visit:    1. Type 2 diabetes mellitus with hyperglycemia, without long-term current use of insulin (Primary)  -     insulin glargine (Lantus) 100 UNIT/ML injection; Inject 25 Units under the skin into the appropriate area as directed Daily.  Dispense: 15 mL; Refill: 1  -inc lantus to 25U daily. cont ozempic and farxiga. will trial 0.5mg dosage once more and if SEs significant, consider dial back to 0.25mg dosage. discussed glipizide if needed due to costs.     2. Primary hypertension  Comments:  encouraged med compliance. goal BP <130/80    3. Hypertriglyceridemia  Comments:  doing well on statin    4. Anxiety  Comments:  cont sertraline. refer to psychiatry for counseling and med management  Orders:  -     Ambulatory Referral to Psychiatry          Medications Discontinued During This Encounter   Medication Reason    escitalopram (Lexapro) 10 MG tablet *Therapy completed    insulin glargine (Lantus) 100 UNIT/ML injection           Follow Up   Return in about 6 weeks (around 5/7/2024).  Patient was given instructions and counseling regarding her condition or for health maintenance advice. Please see specific information pulled into the AVS if appropriate.       Chidi Hobson Jr, MD  03/26/24  12:44 EDT

## 2024-03-27 NOTE — TELEPHONE ENCOUNTER
APPROVED    We’re pleased to let you know that we’ve approved your or your doctor’s request for coverage  for FARXIGA 10MG TAB. You can now fill your prescription, and it will be covered according to  your plan.  As long as you remain covered by your prescription drug plan and there are no changes to your  plan benefits, this request is approved from 03/27/2024 to 03/27/2025.

## 2024-04-05 ENCOUNTER — PATIENT OUTREACH (OUTPATIENT)
Dept: CASE MANAGEMENT | Facility: OTHER | Age: 19
End: 2024-04-05
Payer: COMMERCIAL

## 2024-04-05 ENCOUNTER — TELEPHONE (OUTPATIENT)
Dept: CASE MANAGEMENT | Facility: OTHER | Age: 19
End: 2024-04-05
Payer: COMMERCIAL

## 2024-04-05 NOTE — OUTREACH NOTE
AMBULATORY CASE MANAGEMENT NOTE    Name and Relationship of Patient/Support Person: Amarilis Vinson A - Self    Patient Outreach    Called to check on patient as she has seen PCP and MSW has contacted. Called to close program for case management as goals met and patient requesting support to get her Behavior Health appt moved sooner, see telephone outreach to PCP.         Stacie ROSS  Ambulatory Case Management    4/5/2024, 15:23 EDT

## 2024-04-05 NOTE — TELEPHONE ENCOUNTER
Per Case Management call patient reports she needs sooner appointment with Behavioral Health because her  is deploying 4/22/24 and really needs to talk to someone before then.    I outreached to [facility] and they are not able to get her appointment before end of May first of June.     I called [facility] and they could see her very quickly if you can change referral and make sure her insurance is accepted by them, patient is in agreement.     TY

## 2024-04-19 DIAGNOSIS — E11.65 TYPE 2 DIABETES MELLITUS WITH HYPERGLYCEMIA, WITHOUT LONG-TERM CURRENT USE OF INSULIN: ICD-10-CM

## 2024-04-19 RX ORDER — ACYCLOVIR 400 MG/1
1 TABLET ORAL TAKE AS DIRECTED
Qty: 1 EACH | Refills: 2 | Status: SHIPPED | OUTPATIENT
Start: 2024-04-19

## 2024-04-29 RX ORDER — NORETHINDRONE ACETATE AND ETHINYL ESTRADIOL, ETHINYL ESTRADIOL AND FERROUS FUMARATE 1MG-10(24)
1 KIT ORAL DAILY
Qty: 28 TABLET | Refills: 3 | Status: SHIPPED | OUTPATIENT
Start: 2024-04-29

## 2024-05-14 ENCOUNTER — PRIOR AUTHORIZATION (OUTPATIENT)
Dept: INTERNAL MEDICINE | Facility: CLINIC | Age: 19
End: 2024-05-14

## 2024-05-14 ENCOUNTER — OFFICE VISIT (OUTPATIENT)
Dept: INTERNAL MEDICINE | Facility: CLINIC | Age: 19
End: 2024-05-14
Payer: COMMERCIAL

## 2024-05-14 VITALS
HEART RATE: 85 BPM | OXYGEN SATURATION: 97 % | TEMPERATURE: 98.2 F | SYSTOLIC BLOOD PRESSURE: 154 MMHG | DIASTOLIC BLOOD PRESSURE: 111 MMHG | HEIGHT: 62 IN | WEIGHT: 162.3 LBS | BODY MASS INDEX: 29.87 KG/M2

## 2024-05-14 DIAGNOSIS — E78.1 HYPERTRIGLYCERIDEMIA: ICD-10-CM

## 2024-05-14 DIAGNOSIS — I10 PRIMARY HYPERTENSION: ICD-10-CM

## 2024-05-14 DIAGNOSIS — E11.65 TYPE 2 DIABETES MELLITUS WITH HYPERGLYCEMIA, WITHOUT LONG-TERM CURRENT USE OF INSULIN: Primary | ICD-10-CM

## 2024-05-14 LAB
ALBUMIN SERPL-MCNC: 4.5 G/DL (ref 3.5–5.2)
ALBUMIN/GLOB SERPL: 2 G/DL
ALP SERPL-CCNC: 100 U/L (ref 39–117)
ALT SERPL W P-5'-P-CCNC: 113 U/L (ref 1–33)
ANION GAP SERPL CALCULATED.3IONS-SCNC: 14 MMOL/L (ref 5–15)
AST SERPL-CCNC: 127 U/L (ref 1–32)
BASOPHILS # BLD AUTO: 0.05 10*3/MM3 (ref 0–0.2)
BASOPHILS NFR BLD AUTO: 0.6 % (ref 0–1.5)
BILIRUB SERPL-MCNC: 0.2 MG/DL (ref 0–1.2)
BUN SERPL-MCNC: 14 MG/DL (ref 6–20)
BUN/CREAT SERPL: 17.5 (ref 7–25)
CALCIUM SPEC-SCNC: 9.3 MG/DL (ref 8.6–10.5)
CHLORIDE SERPL-SCNC: 102 MMOL/L (ref 98–107)
CHOLEST SERPL-MCNC: 182 MG/DL (ref 0–200)
CO2 SERPL-SCNC: 20 MMOL/L (ref 22–29)
CREAT SERPL-MCNC: 0.8 MG/DL (ref 0.57–1)
DEPRECATED RDW RBC AUTO: 37.8 FL (ref 37–54)
EGFRCR SERPLBLD CKD-EPI 2021: 107.7 ML/MIN/1.73
EOSINOPHIL # BLD AUTO: 0.17 10*3/MM3 (ref 0–0.4)
EOSINOPHIL NFR BLD AUTO: 2.1 % (ref 0.3–6.2)
ERYTHROCYTE [DISTWIDTH] IN BLOOD BY AUTOMATED COUNT: 12 % (ref 12.3–15.4)
GLOBULIN UR ELPH-MCNC: 2.2 GM/DL
GLUCOSE SERPL-MCNC: 307 MG/DL (ref 65–99)
HBA1C MFR BLD: 9.2 % (ref 4.8–5.6)
HCT VFR BLD AUTO: 41.2 % (ref 34–46.6)
HDLC SERPL-MCNC: 30 MG/DL (ref 40–60)
HGB BLD-MCNC: 13.9 G/DL (ref 12–15.9)
IMM GRANULOCYTES # BLD AUTO: 0.04 10*3/MM3 (ref 0–0.05)
IMM GRANULOCYTES NFR BLD AUTO: 0.5 % (ref 0–0.5)
LDLC SERPL CALC-MCNC: 90 MG/DL (ref 0–100)
LDLC/HDLC SERPL: 2.58 {RATIO}
LYMPHOCYTES # BLD AUTO: 3.02 10*3/MM3 (ref 0.7–3.1)
LYMPHOCYTES NFR BLD AUTO: 36.7 % (ref 19.6–45.3)
MCH RBC QN AUTO: 29.1 PG (ref 26.6–33)
MCHC RBC AUTO-ENTMCNC: 33.7 G/DL (ref 31.5–35.7)
MCV RBC AUTO: 86.2 FL (ref 79–97)
MONOCYTES # BLD AUTO: 0.45 10*3/MM3 (ref 0.1–0.9)
MONOCYTES NFR BLD AUTO: 5.5 % (ref 5–12)
NEUTROPHILS NFR BLD AUTO: 4.49 10*3/MM3 (ref 1.7–7)
NEUTROPHILS NFR BLD AUTO: 54.6 % (ref 42.7–76)
NRBC BLD AUTO-RTO: 0 /100 WBC (ref 0–0.2)
PLATELET # BLD AUTO: 299 10*3/MM3 (ref 140–450)
PMV BLD AUTO: 10.9 FL (ref 6–12)
POTASSIUM SERPL-SCNC: 4.5 MMOL/L (ref 3.5–5.2)
PROT SERPL-MCNC: 6.7 G/DL (ref 6–8.5)
RBC # BLD AUTO: 4.78 10*6/MM3 (ref 3.77–5.28)
SODIUM SERPL-SCNC: 136 MMOL/L (ref 136–145)
TRIGL SERPL-MCNC: 373 MG/DL (ref 0–150)
VLDLC SERPL-MCNC: 62 MG/DL (ref 5–40)
WBC NRBC COR # BLD AUTO: 8.22 10*3/MM3 (ref 3.4–10.8)

## 2024-05-14 PROCEDURE — 83036 HEMOGLOBIN GLYCOSYLATED A1C: CPT | Performed by: INTERNAL MEDICINE

## 2024-05-14 PROCEDURE — 80053 COMPREHEN METABOLIC PANEL: CPT | Performed by: INTERNAL MEDICINE

## 2024-05-14 PROCEDURE — 99214 OFFICE O/P EST MOD 30 MIN: CPT | Performed by: INTERNAL MEDICINE

## 2024-05-14 PROCEDURE — 85025 COMPLETE CBC W/AUTO DIFF WBC: CPT | Performed by: INTERNAL MEDICINE

## 2024-05-14 PROCEDURE — 80061 LIPID PANEL: CPT | Performed by: INTERNAL MEDICINE

## 2024-05-14 RX ORDER — ACYCLOVIR 400 MG/1
1 TABLET ORAL TAKE AS DIRECTED
Qty: 1 EACH | Refills: 2 | Status: SHIPPED | OUTPATIENT
Start: 2024-05-14

## 2024-05-14 RX ORDER — OMEPRAZOLE 20 MG/1
CAPSULE, DELAYED RELEASE ORAL
COMMUNITY
Start: 2024-04-25

## 2024-05-14 NOTE — PROGRESS NOTES
"Chief Complaint  Diabetes (6 mo f/u, issues with Ozempic, and insulin/Blood Glucose ranging from 400 to almost 500/Would also like to discuss options for weight loss medications, other than Ozempic)    Subjective          Amarilis Vinson presents to [facility]  History of Present Illness  DM2- patient reports increase stressors recently due to financial issues and  has went to basic training. Patient reports decrease appetite due to stressors. Patient reports having hyperglycemia as well. Patient reports significant side effects with ozempic. Patient has been unable to get insulin recently due to lack of ability to pay.   Mental health - patient has been taking sertraline.   Hyperlipidemia- doing well on statin  Hypertension- patient reports increase stressors. Blood glucose is currently 400-500.       Current Outpatient Medications   Medication Instructions    Alcohol Swabs pads 1 Pad, Does not apply, Daily    aspirin-acetaminophen-caffeine (EXCEDRIN MIGRAINE) 250-250-65 MG per tablet Oral    atorvastatin (LIPITOR) 20 mg, Oral, Nightly    Continuous Blood Gluc  (Dexcom G7 ) device 1 each, Does not apply, As Needed    Continuous Glucose Sensor (Dexcom G7 Sensor) misc 1 Device, Does not apply, Take As Directed    dapagliflozin Propanediol (FARXIGA) 10 mg, Oral, Daily    glucose blood test strip Use three times per day as needed    hydroCHLOROthiazide 25 mg, Oral, Daily    hydrOXYzine (ATARAX) 10 MG tablet Take 1-3 tab PO BID PRN anxiety    insulin glargine (LANTUS) 25 Units, Subcutaneous, Daily    Insulin Syringe 31G X 5/16\" 1 ML misc 1 syringe, Does not apply, Daily    Lancets (freestyle) lancets Use three times per day as needed to check blood sugars    lisinopril (PRINIVIL,ZESTRIL) 40 mg, Oral, Daily    Lo Loestrin Fe 1 MG-10 MCG / 10 MCG tablet 1 tablet, Oral, Daily    meclizine (ANTIVERT) 25 mg, Oral, 3 Times Daily PRN    omeprazole " "(priLOSEC) 20 MG capsule     ondansetron ODT (ZOFRAN-ODT) 4 mg, Translingual, Every 4 Hours PRN    Rimegepant Sulfate (NURTEC) 75 mg, Oral, Every 48 Hours PRN    sertraline (ZOLOFT) 100 MG tablet 1 tablet, Oral, Daily    Tirzepatide (MOUNJARO) 2.5 mg, Subcutaneous, Weekly     The following portions of the patient's history were reviewed and updated as appropriate: allergies, current medications, past family history, past medical history, past social history, past surgical history, and problem list.    Objective   Vital Signs:   BP (!) 154/111 (BP Location: Right arm, Patient Position: Sitting, Cuff Size: Adult)   Pulse 85   Temp 98.2 °F (36.8 °C) (Temporal)   Ht 157.5 cm (62\")   Wt 73.6 kg (162 lb 4.8 oz)   SpO2 97%   BMI 29.69 kg/m²     BP Readings from Last 3 Encounters:   05/14/24 (!) 154/111   03/26/24 (!) 155/112   02/27/24 140/98     Wt Readings from Last 3 Encounters:   05/14/24 73.6 kg (162 lb 4.8 oz)   03/26/24 75.8 kg (167 lb)   02/27/24 77 kg (169 lb 12.8 oz)         Physical Exam   Appearance: No acute distress, well-nourished  Head: normocephalic, atraumatic  Eyes: extraocular movements intact, no scleral icterus, no conjunctival injection  Ears, Nose, and Throat: external ears normal, nares patent, moist mucous membranes  Cardiovascular: regular rate and rhythm. no murmurs, rubs, or gallops. no edema  Respiratory: breathing comfortably, symmetric chest rise, clear to auscultation bilaterally. No wheezes, rales, or rhonchi.  Neuro: alert and oriented to time, place, and person. Normal gait  Psych: normal mood and affect     Result Review :   The following data was reviewed by: Chidi Hobson Jr, MD on 05/14/2024:  Common labs          8/22/2023    16:51 1/5/2024    16:11 2/6/2024    14:28   Common Labs   Glucose 326  377     BUN 17  10     Creatinine 0.97  0.76     Sodium 134  133     Potassium 4.4  4.7     Chloride 97  98     Calcium 9.8  9.8     Albumin 4.5  4.1     Total Bilirubin 0.2  " 0.2     Alkaline Phosphatase 131  137     AST (SGOT) 96  113     ALT (SGPT) 61  97     WBC 10.74  8.42  10.86    Hemoglobin 14.7  14.4  13.5    Hematocrit 45.2  41.6  39.1    Platelets 348  308  319    Hemoglobin A1C   13.70    Microalbumin, Urine   1.4        Lab Results   Component Value Date    SARSANTIGEN Not Detected 01/18/2023    COVID19 NOT DETECTED 04/05/2021    RAPFLUA Negative 01/18/2023    RAPFLUB Negative 01/18/2023    POCPREGUR Negative 10/12/2022    BILIRUBINUR Negative 01/05/2024          Assessment and Plan    Diagnoses and all orders for this visit:    1. Type 2 diabetes mellitus with hyperglycemia, without long-term current use of insulin (Primary)  Comments:  try mounjaro in lieu of GLP-1. check labs. also refer to CCM to help with med management and ensure med compliance.  Orders:  -     Ambulatory Referral to Chronic Care Management Medication Adherence, Care Coordination  -     Continuous Glucose Sensor (Dexcom G7 Sensor) misc; Use 1 Device Take As Directed.  Dispense: 1 each; Refill: 2  -     Tirzepatide (Mounjaro) 2.5 MG/0.5ML solution pen-injector pen; Inject 0.5 mL under the skin into the appropriate area as directed 1 (One) Time Per Week.  Dispense: 2 mL; Refill: 0  -     Hemoglobin A1c    2. Hypertriglyceridemia  Comments:  cont statin  Orders:  -     Ambulatory Referral to Chronic Care Management Medication Adherence, Care Coordination  -     Lipid Panel    3. Primary hypertension  Comments:  elevated today. close monitoring. f/u in 3-4 weeks.  Orders:  -     Ambulatory Referral to Chronic Care Management Medication Adherence, Care Coordination  -     CBC & Differential  -     Comprehensive Metabolic Panel      Medications Discontinued During This Encounter   Medication Reason    Semaglutide,0.25 or 0.5MG/DOS, (OZEMPIC) 2 MG/1.5ML solution pen-injector Alternate therapy    Continuous Blood Gluc Sensor (Dexcom G7 Sensor) misc Reorder        Follow Up   Return in about 4 weeks (around  6/11/2024) for DM.  Patient was given instructions and counseling regarding her condition or for health maintenance advice. Please see specific information pulled into the AVS if appropriate.       Chidi Hobson Jr, MD  05/14/24  16:17 EDT

## 2024-05-14 NOTE — TELEPHONE ENCOUNTER
PA REQUIRED FOR FOLLOWING MEDICATION:    Tirzepatide (Mounjaro) 2.5 MG/0.5ML solution pen-injector pen (05/14/2024)     INDEXED VIA ONBASE

## 2024-05-15 ENCOUNTER — TELEPHONE (OUTPATIENT)
Dept: CASE MANAGEMENT | Facility: OTHER | Age: 19
End: 2024-05-15
Payer: OTHER GOVERNMENT

## 2024-05-15 NOTE — TELEPHONE ENCOUNTER
Noted patient was on CCM list for referral from PCP and updated Maryam, RN at PCP office who is  nurse that I had patient previously in HRCM, however, noted on further chart review her program is open still. Her referral for Behavioral Health is in system and has changed to [facility] in [place] on 4/8/24 and noted in referral information they sent fax yesterday from PCP office to see status of her referral. I had not closed program yet and updated Maryam as she is now in for CCM and she will outreach patient about that program. Changing responsible staff to Maryam. Did note her A1c has dropped from 13.70% 2/6/24 to 9.2% 5/14/24! Transitioning from Ozempic to Mounjaro due to GI issues, noted today her PA for Mounjaro is approved.

## 2024-05-15 NOTE — TELEPHONE ENCOUNTER
APPROVED    As long as you remain covered by your prescription drug plan and there are no changes to your  plan benefits, this request is approved from 05/15/2024 to 05/15/2027

## 2024-05-16 ENCOUNTER — REFERRAL TRIAGE (OUTPATIENT)
Dept: CASE MANAGEMENT | Facility: OTHER | Age: 19
End: 2024-05-16
Payer: OTHER GOVERNMENT

## 2024-05-20 ENCOUNTER — TELEPHONE (OUTPATIENT)
Dept: INTERNAL MEDICINE | Facility: CLINIC | Age: 19
End: 2024-05-20
Payer: OTHER GOVERNMENT

## 2024-05-20 NOTE — TELEPHONE ENCOUNTER
Caller: Amarilis Vinson    Relationship to patient: Self    Best call back number: [phone]    Chief complaint: KNOT ON LEFT SIDE OF NECK, HAS GROWN LARGER IN THE PAST FEW DAYS AND IS NOW CAUSING PAIN/DISCOMFORT     Type of visit: OFFICE     Requested date: ASAP, ANY DAY IN THE MORNING     If rescheduling, when is the original appointment:  6.4.24    Additional notes: PATIENT CALLED TO SCHEDULE APPOINTMENT FOR THE SYMPTOMS LISTED ABOVE. PATIENT DID SCHEDULE FIRST AVAILABLE HUB HAD WITH MD EWING BUT WOULD LIKE TO BE SEEN SOONER IF POSSIBLE DUE TO CONCERN. PATIENT IS OKAY WITH SEEING ANY PROVIDER. PATIENT CAN BE SEEN ANY DAY BUT NEEDS A MORNING APPOINTMENT.

## 2024-05-20 NOTE — TELEPHONE ENCOUNTER
Called PT, PT is aware and confirmed.   No further question or concerns       Patient also state that she looke dup side effects on the mounjaro and she doesn't like that due to she is unknown of her medical history.     She stated that she would just like insulin sent in or a pump.

## 2024-05-22 NOTE — PROGRESS NOTES
"Chief Complaint  Mass (Posterior neck area, left side. Patient is unsure if it is causing her some short term memory issues and she caught herself the other day reading instructions backwards. ) and Diabetes (Patient has been feeling extremely tired, dehydrated and sweating. She is concerned for her blood sugar due to dexcom reading \"high\". Will obtain POC glucose.)    Subjective          Amarilis Vinson presents to [facility]  History of Present Illness    Here with complaint of left neck lymph node.  First noted 3 days ago.  It is tender to touch.    Earlier this week, she reports feeling confusion while working on lego set.  This was Monday, and reports that she found herself reading everything backwards.  No hypoglycemic measurements in the last month per CGM, and in fact blood sugars have been uncontrolled the majority of the time.    The following day (Tuesday the 21st) had a day of gastrointestinal symptoms (nausea/vomiting/diarrhea and abdominal pain).  This has resolved and having no such issues since that day (I.e., at clinic today, she denies ab pain, nausea or vomiting, and is tolerating PO).  She also denies dysuria.    Of note, she never started mounjaro due to concern for possible side effects after reading package insert.    Although written for 25U lantus daily, she reports taking only 20U.  She is interested in an insulin pump, and in the interim would like short acting insulin with meals.  She has a history of inconsistent medicine use, but insists she has been consistently taking her insulin.  She does report missing her farxiga once this week.    PHQ-9 Depression Screening  Little interest or pleasure in doing things?     Feeling down, depressed, or hopeless?     Trouble falling or staying asleep, or sleeping too much?     Feeling tired or having little energy?     Poor appetite or overeating?     Feeling bad about yourself - or that you are a " "failure or have let yourself or your family down?     Trouble concentrating on things, such as reading the newspaper or watching television?     Moving or speaking so slowly that other people could have noticed? Or the opposite - being so fidgety or restless that you have been moving around a lot more than usual?     Thoughts that you would be better off dead, or of hurting yourself in some way?     PHQ-9 Total Score     If you checked off any problems, how difficult have these problems made it for you to do your work, take care of things at home, or get along with other people?           Current Outpatient Medications   Medication Instructions    Alcohol Swabs pads 1 Pad, Does not apply, Daily    aspirin-acetaminophen-caffeine (EXCEDRIN MIGRAINE) 250-250-65 MG per tablet Oral    atorvastatin (LIPITOR) 20 mg, Oral, Nightly    Continuous Blood Gluc  (Dexcom G7 ) device 1 each, Does not apply, As Needed    Continuous Glucose Sensor (Dexcom G7 Sensor) misc 1 Device, Does not apply, Take As Directed    dapagliflozin Propanediol (FARXIGA) 10 mg, Oral, Daily    glucose blood test strip Use three times per day as needed    hydroCHLOROthiazide 25 mg, Oral, Daily    hydrOXYzine (ATARAX) 10 MG tablet Take 1-3 tab PO BID PRN anxiety    Insulin Aspart FlexPen 5 Units, Subcutaneous, 3 Times Daily With Meals    insulin glargine (LANTUS) 25 Units, Subcutaneous, Daily    Insulin Syringe 31G X 5/16\" 1 ML misc 1 syringe, Does not apply, Daily    Lancets (freestyle) lancets Use three times per day as needed to check blood sugars    lisinopril (PRINIVIL,ZESTRIL) 40 mg, Oral, Daily    meclizine (ANTIVERT) 25 mg, Oral, 3 Times Daily PRN    Norethin-Eth Estrad-Fe Biphas (Lo Loestrin Fe) 1 MG-10 MCG / 10 MCG tablet 1 tablet, Oral, Daily    omeprazole (priLOSEC) 20 MG capsule Take 1 capsule by mouth Daily.    ondansetron ODT (ZOFRAN-ODT) 4 mg, Translingual, Every 4 Hours PRN    Rimegepant Sulfate (NURTEC) 75 mg, Oral, Every " "48 Hours PRN    sertraline (ZOLOFT) 100 MG tablet 1 tablet, Oral, Daily    Tirzepatide (MOUNJARO) 2.5 mg, Subcutaneous, Weekly       The following portions of the patient's history were reviewed and updated as appropriate: allergies, current medications, past family history, past medical history, past social history, past surgical history, and problem list.    Objective   Vital Signs:   /88 (BP Location: Left arm, Patient Position: Sitting, Cuff Size: Adult)   Pulse 90   Temp 97.3 °F (36.3 °C) (Temporal)   Ht 157.5 cm (62\")   Wt 72.6 kg (160 lb)   SpO2 95%   BMI 29.26 kg/m²     BP Readings from Last 3 Encounters:   05/23/24 137/88   05/14/24 (!) 154/111   03/26/24 (!) 155/112     Wt Readings from Last 3 Encounters:   05/23/24 72.6 kg (160 lb)   05/14/24 73.6 kg (162 lb 4.8 oz)   03/26/24 75.8 kg (167 lb)           Physical Exam  Vitals reviewed.   Constitutional:       General: She is not in acute distress.     Appearance: Normal appearance. She is not ill-appearing, toxic-appearing or diaphoretic.   HENT:      Head: Normocephalic and atraumatic.      Right Ear: External ear normal.      Left Ear: External ear normal.   Eyes:      Conjunctiva/sclera: Conjunctivae normal.   Neck:      Comments: Left posterior cervical LN that is TTP, and just over 0.5 cm in diameter  No other cervical LAD.  No pre-auricular, post-auricular, occipital, submandibular, supraclavicular or axillary LAD.  Cardiovascular:      Rate and Rhythm: Normal rate and regular rhythm.      Pulses: Normal pulses.      Heart sounds: Normal heart sounds. No murmur heard.     No friction rub. No gallop.   Pulmonary:      Effort: Pulmonary effort is normal. No respiratory distress.      Breath sounds: Normal breath sounds. No stridor. No wheezing, rhonchi or rales.   Chest:      Chest wall: No tenderness.   Abdominal:      General: Abdomen is flat.      Palpations: Abdomen is soft. There is no mass.      Tenderness: There is no abdominal " tenderness.   Musculoskeletal:      Right lower leg: No edema.      Left lower leg: No edema.   Skin:     General: Skin is warm and dry.   Neurological:      General: No focal deficit present.      Mental Status: She is alert. Mental status is at baseline.   Psychiatric:         Behavior: Behavior normal.         Thought Content: Thought content normal.         Judgment: Judgment normal.        Result Review :   The following data was reviewed by: Nghia Ramirez MD on 05/23/2024:  Common labs          1/5/2024    16:11 2/6/2024    14:28 5/14/2024    12:09   Common Labs   Glucose 377   307    BUN 10   14    Creatinine 0.76   0.80    Sodium 133   136    Potassium 4.7   4.5    Chloride 98   102    Calcium 9.8   9.3    Albumin 4.1   4.5    Total Bilirubin 0.2   0.2    Alkaline Phosphatase 137   100    AST (SGOT) 113   127    ALT (SGPT) 97   113    WBC 8.42  10.86  8.22    Hemoglobin 14.4  13.5  13.9    Hematocrit 41.6  39.1  41.2    Platelets 308  319  299    Total Cholesterol   182    Triglycerides   373    HDL Cholesterol   30    LDL Cholesterol    90    Hemoglobin A1C  13.70  9.20    Microalbumin, Urine  1.4              Lab Results   Component Value Date    SARSANTIGEN Not Detected 01/18/2023    COVID19 NOT DETECTED 04/05/2021    RAPFLUA Negative 01/18/2023    RAPFLUB Negative 01/18/2023    POCPREGUR Negative 05/23/2024    BILIRUBINUR Negative 05/23/2024       Procedures   Accucheck today shows glucose 289  POC Urinalysis negative ketones  POC urine pregnant test negative       Assessment and Plan    Diagnoses and all orders for this visit:    1. Type 2 diabetes mellitus with hyperglycemia, without long-term current use of insulin (Primary)  -     POCT Glucose  -     POC Urinalysis Dipstick, Automated  -     insulin glargine (Lantus) 100 UNIT/ML injection; Inject 25 Units under the skin into the appropriate area as directed Daily.  Dispense: 15 mL; Refill: 1  -     Insulin Aspart FlexPen 100 UNIT/ML solution  pen-injector; Inject 5 Units under the skin into the appropriate area as directed 3 (Three) Times a Day With Meals.  Dispense: 15 mL; Refill: 2  -     Ambulatory Referral to Endocrinology    2. Cervical lymphadenopathy    3. Gastroenteritis    4. Uses birth control  -     POCT pregnancy, urine  -     Norethin-Eth Estrad-Fe Biphas (Lo Loestrin Fe) 1 MG-10 MCG / 10 MCG tablet; Take 1 tablet by mouth Daily.  Dispense: 28 tablet; Refill: 3      T2DM:  -uncontrolled.  No hypoglycemic readings  -will increase long acting from 20U to 25U  -will add 5U TID short acting with meals.  Counseled to not use short acting if pre-meal b.s. >= 150  -I did reassure her regarding mounjaro, but at this time she does not desire to trial this medicine  -have referred to Monet Nava for consideration for insulin pump  -discussed signs concerning for DKA, including nausea/vomiting, ketones in urine, abdominal pain, dehydration (in the context of elevated blood sugars).  If she were to experience these symptoms I did recommend ER evaluation    LAD:  -suspect 2/2 to recent illness  -will monitor for now  -if still present at upcoming appointment in one month's time, will consider further evaluation    Gastroenteritis:  -resolved    Medications Discontinued During This Encounter   Medication Reason    insulin glargine (Lantus) 100 UNIT/ML injection Reorder    Lo Loestrin Fe 1 MG-10 MCG / 10 MCG tablet Reorder      I spent 40 minutes caring for Amarilis on this date of service. This time includes time spent by me in the following activities:preparing for the visit, reviewing tests, obtaining and/or reviewing a separately obtained history, performing a medically appropriate examination and/or evaluation , counseling and educating the patient/family/caregiver, ordering medications, tests, or procedures, referring and communicating with other health care professionals , documenting information in the medical record, and independently  interpreting results and communicating that information with the patient/family/caregiver  Follow Up   Return if symptoms worsen or fail to improve.  Patient was given instructions and counseling regarding her condition or for health maintenance advice. Please see specific information pulled into the AVS if appropriate.       Nghia Ramirez MD  05/23/24  11:11 EDT

## 2024-05-23 ENCOUNTER — OFFICE VISIT (OUTPATIENT)
Dept: INTERNAL MEDICINE | Facility: CLINIC | Age: 19
End: 2024-05-23
Payer: OTHER GOVERNMENT

## 2024-05-23 VITALS
SYSTOLIC BLOOD PRESSURE: 137 MMHG | HEIGHT: 62 IN | BODY MASS INDEX: 29.44 KG/M2 | HEART RATE: 90 BPM | WEIGHT: 160 LBS | DIASTOLIC BLOOD PRESSURE: 88 MMHG | TEMPERATURE: 97.3 F | OXYGEN SATURATION: 95 %

## 2024-05-23 DIAGNOSIS — Z78.9 USES BIRTH CONTROL: ICD-10-CM

## 2024-05-23 DIAGNOSIS — K52.9 GASTROENTERITIS: ICD-10-CM

## 2024-05-23 DIAGNOSIS — E11.65 TYPE 2 DIABETES MELLITUS WITH HYPERGLYCEMIA, WITHOUT LONG-TERM CURRENT USE OF INSULIN: Primary | ICD-10-CM

## 2024-05-23 DIAGNOSIS — R59.0 CERVICAL LYMPHADENOPATHY: ICD-10-CM

## 2024-05-23 LAB
B-HCG UR QL: NEGATIVE
BILIRUB BLD-MCNC: NEGATIVE MG/DL
CLARITY, POC: CLEAR
COLOR UR: YELLOW
EXPIRATION DATE: ABNORMAL
EXPIRATION DATE: NORMAL
GLUCOSE BLDC GLUCOMTR-MCNC: 289 MG/DL (ref 70–130)
GLUCOSE UR STRIP-MCNC: ABNORMAL MG/DL
INTERNAL NEGATIVE CONTROL: NEGATIVE
INTERNAL POSITIVE CONTROL: POSITIVE
KETONES UR QL: NEGATIVE
LEUKOCYTE EST, POC: NEGATIVE
Lab: ABNORMAL
Lab: NORMAL
NITRITE UR-MCNC: NEGATIVE MG/ML
PH UR: 6 [PH] (ref 5–8)
PROT UR STRIP-MCNC: NEGATIVE MG/DL
RBC # UR STRIP: NEGATIVE /UL
SP GR UR: 1.01 (ref 1–1.03)
UROBILINOGEN UR QL: NORMAL

## 2024-05-23 PROCEDURE — 81025 URINE PREGNANCY TEST: CPT | Performed by: STUDENT IN AN ORGANIZED HEALTH CARE EDUCATION/TRAINING PROGRAM

## 2024-05-23 PROCEDURE — 81003 URINALYSIS AUTO W/O SCOPE: CPT | Performed by: STUDENT IN AN ORGANIZED HEALTH CARE EDUCATION/TRAINING PROGRAM

## 2024-05-23 PROCEDURE — 82948 REAGENT STRIP/BLOOD GLUCOSE: CPT | Performed by: STUDENT IN AN ORGANIZED HEALTH CARE EDUCATION/TRAINING PROGRAM

## 2024-05-23 PROCEDURE — 99215 OFFICE O/P EST HI 40 MIN: CPT | Performed by: STUDENT IN AN ORGANIZED HEALTH CARE EDUCATION/TRAINING PROGRAM

## 2024-05-23 RX ORDER — INSULIN GLARGINE 100 [IU]/ML
25 INJECTION, SOLUTION SUBCUTANEOUS DAILY
Qty: 15 ML | Refills: 1 | Status: SHIPPED | OUTPATIENT
Start: 2024-05-23

## 2024-05-23 RX ORDER — NORETHINDRONE ACETATE AND ETHINYL ESTRADIOL, ETHINYL ESTRADIOL AND FERROUS FUMARATE 1MG-10(24)
1 KIT ORAL DAILY
Qty: 28 TABLET | Refills: 3 | Status: SHIPPED | OUTPATIENT
Start: 2024-05-23

## 2024-05-23 RX ORDER — INSULIN ASPART 100 [IU]/ML
5 INJECTION, SOLUTION INTRAVENOUS; SUBCUTANEOUS
Qty: 15 ML | Refills: 2 | Status: SHIPPED | OUTPATIENT
Start: 2024-05-23

## 2024-05-24 ENCOUNTER — PRIOR AUTHORIZATION (OUTPATIENT)
Dept: INTERNAL MEDICINE | Facility: CLINIC | Age: 19
End: 2024-05-24

## 2024-05-24 NOTE — TELEPHONE ENCOUNTER
PA REQUIRED FOR FOLLOWING MEDICATION:    Insulin Aspart FlexPen 100 UNIT/ML solution pen-injector (05/23/2024)     INDEXED VIA ONBASE

## 2024-06-04 ENCOUNTER — DOCUMENTATION (OUTPATIENT)
Dept: CASE MANAGEMENT | Facility: OTHER | Age: 19
End: 2024-06-04
Payer: OTHER GOVERNMENT

## 2024-06-04 ENCOUNTER — PATIENT OUTREACH (OUTPATIENT)
Dept: CASE MANAGEMENT | Facility: OTHER | Age: 19
End: 2024-06-04
Payer: OTHER GOVERNMENT

## 2024-06-04 DIAGNOSIS — E11.65 TYPE 2 DIABETES MELLITUS WITH HYPERGLYCEMIA, WITHOUT LONG-TERM CURRENT USE OF INSULIN: ICD-10-CM

## 2024-06-04 NOTE — PROGRESS NOTES
I spoke with this patient at length today regarding her blood glucose. She states her average BG is 246 and her Am BG is 400. We also discussed what her typical day of meals consists of. Patient states the following:  Breakfast: Nothing or 2 waffles, sausage and fruit  Lunch:Protein, vegetable, grain and milk  Dinner:Nothing or Vegetable beef soup  Snacks consist of cheese, nuts, carrots, nutrigrain bar, gummies  Drinks:water, milk, zero sugar soft drinks    Patient states she takes Lantus 25 units in the morning and Novolog 5 units with meals (not snacks). She is not taking Mounjaro due to a fear of the potential side effects. Patient has inquired about an insulin pump. She also states she is having difficulty affording Novolog. She states the copay is $71. She states she does have currently have Novolog because she was able to use some of her 's paycheck to pay for it this month.    Patient states she has daily headaches and is currently treating with Excedrin. She was previously on Nurtec. Will check with provider to see if it is okay to give patient samples to try again.    I have sent a Social Work referral regarding the Novolog concern.     I have sent electronic communication to PCP regarding any possible changes that need to be made to her insulin doses or times. I have also relayed the patient's request for an insulin pump.    Patient has my direct number and we have established a eleonora for communication.

## 2024-06-04 NOTE — OUTREACH NOTE
AMBULATORY CASE MANAGEMENT NOTE    Names and Relationships of Patient/Support Persons: Caller: Amarilis Vinson; Relationship: Self -     Patient Outreach    I spoke with this patient at length today regarding her blood glucose. She states her average BG is 246 and her Am BG is 400. We also discussed what her typical day of meals consists of. Patient states the following:  Breakfast: Nothing or 2 waffles, sausage and fruit  Lunch:Protein, vegetable, grain and milk  Dinner:Nothing or Vegetable beef soup  Snacks consist of cheese, nuts, carrots, nutrigrain bar, gummies  Drinks:water, milk, zero sugar soft drinks     Patient states she takes Lantus 25 units in the morning and Novolog 5 units with meals (not snacks). She is not taking Mounjaro due to a fear of the potential side effects. Patient has inquired about an insulin pump. She also states she is having difficulty affording Novolog. She states the copay is $71. She states she does have currently have Novolog because she was able to use some of her 's paycheck to pay for it this month.     Patient states she has daily headaches and is currently treating with Excedrin. She was previously on Nurtec. Will check with provider to see if it is okay to give patient samples to try again.    Care Coordination     I have sent a Social Work referral regarding the Novolog concern.      I have sent electronic communication to PCP regarding any possible changes that need to be made to her insulin doses or times. I have also relayed the patient's request for an insulin pump.     Patient has my direct number and we have established a eleonora for communication.        Maryam CHAMBERS  Ambulatory Case Management    6/4/2024, 10:02 EDT    AMBULATORY CASE MANAGEMENT NOTE     Names and Relationships of Patient/Support Persons: Caller: Amarilis Vinson; Relationship: Self -      Patient Outreach    Based on discussion with Dr. Hobson, informed patient to stop taking Excedrin due to her  likely having rebound headaches and he will trial Nurtec with her. Nurtec samples will be at the front office for her.     Care Coordination     Had electronic communication with PCP and his MA regarding Nurtec samples for patient's migraines.           Maryam CHAMBERS  Ambulatory Case Management     6/4/2024, 15:02 EDT

## 2024-06-05 ENCOUNTER — PATIENT OUTREACH (OUTPATIENT)
Age: 19
End: 2024-06-05
Payer: OTHER GOVERNMENT

## 2024-06-05 NOTE — OUTREACH NOTE
Social Work Assessment  Questions/Answers      Flowsheet Row Most Recent Value   Referral Source nursing   Reason for Consult community resources, financial concerns, medication concerns   Preferred Language English   People in Home spouse   Current Living Arrangements home   In the past 12 months has the electric, gas, oil, or water company threatened to shut off services in your home? No   Primary Care Provided by self   Quality of Family Relationships helpful   Source of Income salary/wages   Financial/Environmental Concerns unable to afford medication(s)   Application for Public Assistance obtained public assistance pending number          SDOH updated and reviewed with the patient during this program:  --     Disabilities: Not At Risk (6/5/2024)    Disabilities     Concentrating, Remembering, or Making Decisions Difficulty: no     Doing Errands Independently Difficulty: no      --     Employment: Not At Risk (6/5/2024)    Employment     Do you want help finding or keeping work or a job?: I do not need or want help      Financial Resource Strain: High Risk (3/1/2024)    Overall Financial Resource Strain (CARDIA)     Difficulty of Paying Living Expenses: Very hard      --     Food Insecurity: Food Insecurity Present (3/1/2024)    Hunger Vital Sign     Worried About Running Out of Food in the Last Year: Often true     Ran Out of Food in the Last Year: Often true      --     Housing Stability: Not At Risk (6/5/2024)    Housing Stability     Current Living Arrangements: home     Potentially Unsafe Housing Conditions: none      --     Transportation Needs: Unmet Transportation Needs (3/1/2024)    PRAPARE - Transportation     Lack of Transportation (Medical): Yes     Lack of Transportation (Non-Medical): Yes      --     Utilities: Not At Risk (6/5/2024)    [facility] Utilities     Threatened with loss of utilities: No     Patient Outreach    MSW sent patient [employer] information for assistance with NovoLog medication costs.  MSW also inquired if patient needs further assistance and would be glad to help.    Elisa CALI -   Ambulatory Case Management    6/5/2024, 15:44 EDT

## 2024-06-07 ENCOUNTER — PRIOR AUTHORIZATION (OUTPATIENT)
Dept: INTERNAL MEDICINE | Facility: CLINIC | Age: 19
End: 2024-06-07
Payer: OTHER GOVERNMENT

## 2024-06-07 ENCOUNTER — PATIENT OUTREACH (OUTPATIENT)
Dept: CASE MANAGEMENT | Facility: OTHER | Age: 19
End: 2024-06-07
Payer: OTHER GOVERNMENT

## 2024-06-07 DIAGNOSIS — E11.65 TYPE 2 DIABETES MELLITUS WITH HYPERGLYCEMIA, WITHOUT LONG-TERM CURRENT USE OF INSULIN: ICD-10-CM

## 2024-06-07 DIAGNOSIS — E11.65 TYPE 2 DIABETES MELLITUS WITH HYPERGLYCEMIA, WITHOUT LONG-TERM CURRENT USE OF INSULIN: Primary | ICD-10-CM

## 2024-06-07 RX ORDER — ACYCLOVIR 400 MG/1
1 TABLET ORAL AS NEEDED
Qty: 1 EACH | Refills: 0 | Status: SHIPPED | OUTPATIENT
Start: 2024-06-07

## 2024-06-07 RX ORDER — INSULIN GLARGINE 100 [IU]/ML
30 INJECTION, SOLUTION SUBCUTANEOUS DAILY
Qty: 15 ML | Refills: 1 | Status: SHIPPED | OUTPATIENT
Start: 2024-06-07

## 2024-06-07 RX ORDER — DULAGLUTIDE 0.75 MG/.5ML
0.75 INJECTION, SOLUTION SUBCUTANEOUS WEEKLY
Qty: 2 ML | Refills: 0 | Status: SHIPPED | OUTPATIENT
Start: 2024-06-07

## 2024-06-07 RX ORDER — ACYCLOVIR 400 MG/1
1 TABLET ORAL TAKE AS DIRECTED
Qty: 1 EACH | Refills: 2 | Status: SHIPPED | OUTPATIENT
Start: 2024-06-07

## 2024-06-07 NOTE — OUTREACH NOTE
AMBULATORY CASE MANAGEMENT NOTE    Names and Relationships of Patient/Support Persons: Caller: Amarilis Vinson; Relationship: Self -     Patient Outreach    I spoke with patient after having electronic communication with Dr. Hobson. Dr. Hobson wants patient to increase Lantus to 30 units daily. It does not matter what time of the day the patient takes the Lantus-morning or night. I communicated this to the patient.    I spoke with patient briefly about starting Trulicity instead of Mounjaro. Patient unsure and unable to discuss in detail presently due to being at work.Patient states she will call me back today during her lunch time.    Patient requested a prescription to be sent in for a Dexcom G7 and a refill on her Lantus.    Care Coordination    Communicated electronically with PCP regarding the above. Pended an order to refill Lantus. I also requested that PCP sent in prescription for Trulicity.    Maryam CHAMBERS  Ambulatory Case Management    6/7/2024, 09:00 EDT

## 2024-06-07 NOTE — TELEPHONE ENCOUNTER
PA REQUIRED FOR FOLLOWING MEDICATION:    Dulaglutide (Trulicity) 0.75 MG/0.5ML solution pen-injector (06/07/2024)     INDEXED VIA ONBASE

## 2024-06-10 NOTE — TELEPHONE ENCOUNTER
APPROVED    As long as you remain covered by your prescription drug plan and there are no changes to your  plan benefits, this request is approved from 06/10/2024 to 06/10/2025.

## 2024-06-11 ENCOUNTER — TELEPHONE (OUTPATIENT)
Dept: INTERNAL MEDICINE | Facility: CLINIC | Age: 19
End: 2024-06-11
Payer: OTHER GOVERNMENT

## 2024-06-11 NOTE — TELEPHONE ENCOUNTER
CALLED PATIENT TO CHECK ON THIS REFERRAL AND ASKED FOR A CALL BACK TO DISCUSS WHETHER SHE HAS BEEN SEEN, STILL NEEDING THIS REFERRAL OR WANTS TO CLOSE IT.

## 2024-06-14 ENCOUNTER — TELEPHONE (OUTPATIENT)
Dept: INTERNAL MEDICINE | Facility: CLINIC | Age: 19
End: 2024-06-14
Payer: OTHER GOVERNMENT

## 2024-06-20 ENCOUNTER — TELEPHONE (OUTPATIENT)
Dept: CASE MANAGEMENT | Facility: OTHER | Age: 19
End: 2024-06-20
Payer: OTHER GOVERNMENT

## 2024-06-21 ENCOUNTER — PATIENT OUTREACH (OUTPATIENT)
Dept: CASE MANAGEMENT | Facility: OTHER | Age: 19
End: 2024-06-21
Payer: OTHER GOVERNMENT

## 2024-06-21 DIAGNOSIS — E11.65 TYPE 2 DIABETES MELLITUS WITH HYPERGLYCEMIA, WITHOUT LONG-TERM CURRENT USE OF INSULIN: Primary | ICD-10-CM

## 2024-06-21 NOTE — OUTREACH NOTE
AMBULATORY CASE MANAGEMENT NOTE    Names and Relationships of Patient/Support Persons: Caller: Amarilis Vinson; Relationship: Self  Caller: Amarilis Vinson; Relationship: Self -     I spoke with patient today regarding her diabetic needs. Patient initially was needing assistance with paying for her insulin. She states now that it is being covered in full by her insurance. There was some confusion at the pharmacy, according to the patient.    Patient has an appointment with LA Nava (endocrinology) on 8/27 to discussed how better to be able to control her blood glucose and the possibilty of getting an insulin pump.    At this time, I am closing her HRCM program because she is set up with the resources to be able to manage her diabetes successfully. Patient can reach out to me if she discovers other needs she may have. Patient is agreeable to this.      Maryam CHAMBERS  Ambulatory Case Management    6/21/2024, 11:50 EDT

## 2024-07-10 ENCOUNTER — TELEPHONE (OUTPATIENT)
Dept: INTERNAL MEDICINE | Facility: CLINIC | Age: 19
End: 2024-07-10
Payer: OTHER GOVERNMENT

## 2024-07-10 DIAGNOSIS — E11.65 TYPE 2 DIABETES MELLITUS WITH HYPERGLYCEMIA, WITHOUT LONG-TERM CURRENT USE OF INSULIN: ICD-10-CM

## 2024-07-10 DIAGNOSIS — E11.65 TYPE 2 DIABETES MELLITUS WITH HYPERGLYCEMIA, WITHOUT LONG-TERM CURRENT USE OF INSULIN: Chronic | ICD-10-CM

## 2024-07-10 RX ORDER — LANCETS 28 GAUGE
EACH MISCELLANEOUS
Qty: 100 EACH | Refills: 4 | Status: SHIPPED | OUTPATIENT
Start: 2024-07-10

## 2024-07-10 RX ORDER — PEN NEEDLE, DIABETIC 32GX 5/32"
1 NEEDLE, DISPOSABLE MISCELLANEOUS DAILY
Qty: 200 EACH | Refills: 2 | Status: SHIPPED | OUTPATIENT
Start: 2024-07-10

## 2024-07-10 RX ORDER — PEN NEEDLE, DIABETIC 32GX 5/32"
NEEDLE, DISPOSABLE MISCELLANEOUS DAILY
COMMUNITY
Start: 2024-05-25 | End: 2024-07-10 | Stop reason: SDUPTHER

## 2024-07-10 RX ORDER — SERTRALINE HYDROCHLORIDE 100 MG/1
100 TABLET, FILM COATED ORAL DAILY
Qty: 30 TABLET | Refills: 1 | Status: SHIPPED | OUTPATIENT
Start: 2024-07-10

## 2024-07-10 NOTE — TELEPHONE ENCOUNTER
Patient is having difficulty getting her Dexcom to work. [facility] told the patient is not their problem and she should contact [employer]. Wanting to switch to Mckaylajenn Monroyyle.    Patient has also not had her Zoloft in the last month.   Patient reports she is not able to get it at [facility].   She is needing it resent.     I scheduled the patient with Dr. Hobson at her request as well.     Patient is requesting a call back from Dr. Hobson's MA today.

## 2024-07-10 NOTE — TELEPHONE ENCOUNTER
Rx Refill Note  Requested Prescriptions     Pending Prescriptions Disp Refills    Lancets (freestyle) lancets 100 each 4     Sig: Use 3 times per day as needed to check blood sugars    glucose blood test strip 400 each 12     Sig: Use three times per day as needed    BD Pen Needle Amberly 2nd Gen 32G X 4 MM misc 200 each 2     Sig: Use 1 each Daily. use as directed    sertraline (ZOLOFT) 100 MG tablet 30 tablet 1     Sig: Take 1 tablet by mouth Daily.      Last office visit with prescribing clinician: 5/14/2024   Last telemedicine visit with prescribing clinician: Visit date not found   Next office visit with prescribing clinician: 7/10/2024                         Would you like a call back once the refill request has been completed: [] Yes [] No    If the office needs to give you a call back, can they leave a voicemail: [] Yes [] No    Jagdish Mobley  07/10/24, 14:17 EDT

## 2024-07-10 NOTE — TELEPHONE ENCOUNTER
Caller: Amarilis Vinson    Relationship to patient: Self    Best call back number: [phone]     Patient is needing: REQUESTING CALL BACK FROM MD WOODARD NURSE ABOUT LANTUS PEN NEEDLES.

## 2024-07-22 ENCOUNTER — OFFICE VISIT (OUTPATIENT)
Dept: INTERNAL MEDICINE | Facility: CLINIC | Age: 19
End: 2024-07-22
Payer: COMMERCIAL

## 2024-07-22 VITALS
OXYGEN SATURATION: 98 % | TEMPERATURE: 98.4 F | DIASTOLIC BLOOD PRESSURE: 80 MMHG | BODY MASS INDEX: 30.62 KG/M2 | WEIGHT: 166.4 LBS | HEIGHT: 62 IN | HEART RATE: 91 BPM | SYSTOLIC BLOOD PRESSURE: 151 MMHG

## 2024-07-22 DIAGNOSIS — I10 PRIMARY HYPERTENSION: ICD-10-CM

## 2024-07-22 DIAGNOSIS — E11.65 TYPE 2 DIABETES MELLITUS WITH HYPERGLYCEMIA, WITHOUT LONG-TERM CURRENT USE OF INSULIN: Primary | ICD-10-CM

## 2024-07-22 DIAGNOSIS — G89.29 CHRONIC WRIST PAIN, LEFT: ICD-10-CM

## 2024-07-22 DIAGNOSIS — M25.532 CHRONIC WRIST PAIN, LEFT: ICD-10-CM

## 2024-07-22 PROCEDURE — 99214 OFFICE O/P EST MOD 30 MIN: CPT | Performed by: INTERNAL MEDICINE

## 2024-07-22 RX ORDER — ACYCLOVIR 400 MG/1
1 TABLET ORAL AS NEEDED
Qty: 1 EACH | Refills: 0 | Status: SHIPPED | OUTPATIENT
Start: 2024-07-22

## 2024-07-22 RX ORDER — DULAGLUTIDE 0.75 MG/.5ML
0.75 INJECTION, SOLUTION SUBCUTANEOUS WEEKLY
Qty: 6 ML | Refills: 1 | Status: SHIPPED | OUTPATIENT
Start: 2024-07-22

## 2024-07-22 RX ORDER — PEN NEEDLE, DIABETIC 32GX 5/32"
1 NEEDLE, DISPOSABLE MISCELLANEOUS
Qty: 200 EACH | Refills: 3 | Status: SHIPPED | OUTPATIENT
Start: 2024-07-22

## 2024-07-22 RX ORDER — DAPAGLIFLOZIN 10 MG/1
10 TABLET, FILM COATED ORAL DAILY
Qty: 90 TABLET | Refills: 3 | Status: SHIPPED | OUTPATIENT
Start: 2024-07-22

## 2024-07-22 RX ORDER — ACYCLOVIR 400 MG/1
1 TABLET ORAL TAKE AS DIRECTED
Qty: 1 EACH | Refills: 2 | Status: SHIPPED | OUTPATIENT
Start: 2024-07-22

## 2024-07-22 NOTE — PROGRESS NOTES
"Chief Complaint  Diabetes (Type 2 Follow up ), Wrist Pain (Patient is requesting surgery on her wrist ), and Medication Problem (Pt stated that she has not had her insulin in over a month because of the pharmacy./)    Subjective          Amarilis Vinson presents to [facility]  History of Present Illness  DM2- patient reports she has been unable to get diabetic supplies. She has been unable to get her insulin as well. She does not have needles to use. Patient does not currently have trulicity either.   Hypertension- patient denies headaches, dizziness, chest pain. Patient without home Blood Pressure readings. Patient has been unable to keep medication down recently. Patient reports recently having stomach virus.   Hyperlipidemia- doing well on statin.   Patient reports having ongoing left wrist pain. She previously had surgery on right wrist. She reports having similar pain.     Current Outpatient Medications   Medication Instructions    Alcohol Swabs pads 1 Pad, Does not apply, Daily    aspirin-acetaminophen-caffeine (EXCEDRIN MIGRAINE) 250-250-65 MG per tablet Oral    atorvastatin (LIPITOR) 20 mg, Oral, Nightly    BD Pen Needle Amberly 2nd Gen 32G X 4 MM misc 1 each, Does not apply, 5 Times Daily, use as directed    Continuous Glucose  (Dexcom G7 ) device 1 each, Does not apply, As Needed    Continuous Glucose Sensor (Dexcom G7 Sensor) misc 1 Device, Does not apply, Take As Directed    dapagliflozin Propanediol (FARXIGA) 10 mg, Oral, Daily    glucose blood test strip Use three times per day as needed    hydroCHLOROthiazide 25 mg, Oral, Daily    hydrOXYzine (ATARAX) 10 MG tablet Take 1-3 tab PO BID PRN anxiety    insulin glargine (LANTUS) 30 Units, Subcutaneous, Daily    Insulin Syringe 31G X 5/16\" 1 ML misc 1 syringe, Does not apply, Daily    Lancets (freestyle) lancets Use 3 times per day as needed to check blood sugars    lisinopril (PRINIVIL,ZESTRIL) " "40 mg, Oral, Daily    meclizine (ANTIVERT) 25 mg, Oral, 3 Times Daily PRN    Norethin-Eth Estrad-Fe Biphas (Lo Loestrin Fe) 1 MG-10 MCG / 10 MCG tablet 1 tablet, Oral, Daily    NovoLOG FlexPen 5 Units, Subcutaneous, 3 Times Daily With Meals    omeprazole (priLOSEC) 20 MG capsule Take 1 capsule by mouth Daily.    ondansetron ODT (ZOFRAN-ODT) 4 mg, Translingual, Every 4 Hours PRN    Rimegepant Sulfate (NURTEC) 75 mg, Oral, Every 48 Hours PRN    sertraline (ZOLOFT) 100 mg, Oral, Daily    Trulicity 0.75 mg, Subcutaneous, Weekly       The following portions of the patient's history were reviewed and updated as appropriate: allergies, current medications, past family history, past medical history, past social history, past surgical history, and problem list.    Objective   Vital Signs:   /80 (BP Location: Left arm)   Pulse 91   Temp 98.4 °F (36.9 °C) (Temporal)   Ht 157.5 cm (62\")   Wt 75.5 kg (166 lb 6.4 oz)   SpO2 98%   BMI 30.43 kg/m²     BP Readings from Last 3 Encounters:   07/22/24 151/80   05/23/24 137/88   05/14/24 (!) 154/111     Wt Readings from Last 3 Encounters:   07/22/24 75.5 kg (166 lb 6.4 oz)   05/23/24 72.6 kg (160 lb)   05/14/24 73.6 kg (162 lb 4.8 oz)       Physical Exam   Appearance: No acute distress, well-nourished  Head: normocephalic, atraumatic  Eyes: extraocular movements intact, no scleral icterus, no conjunctival injection  Ears, Nose, and Throat: external ears normal, nares patent, moist mucous membranes  Cardiovascular: regular rate and rhythm. no murmurs, rubs, or gallops. no edema  Respiratory: breathing comfortably, symmetric chest rise, clear to auscultation bilaterally. No wheezes, rales, or rhonchi.  Neuro: alert and oriented to time, place, and person. Normal gait  Psych: normal mood and affect     Result Review :   The following data was reviewed by: Chidi Hobosn Jr, MD on 07/22/2024:  Common labs          1/5/2024    16:11 2/6/2024    14:28 5/14/2024    12:09 "   Common Labs   Glucose 377   307    BUN 10   14    Creatinine 0.76   0.80    Sodium 133   136    Potassium 4.7   4.5    Chloride 98   102    Calcium 9.8   9.3    Albumin 4.1   4.5    Total Bilirubin 0.2   0.2    Alkaline Phosphatase 137   100    AST (SGOT) 113   127    ALT (SGPT) 97   113    WBC 8.42  10.86  8.22    Hemoglobin 14.4  13.5  13.9    Hematocrit 41.6  39.1  41.2    Platelets 308  319  299    Total Cholesterol   182    Triglycerides   373    HDL Cholesterol   30    LDL Cholesterol    90    Hemoglobin A1C  13.70  9.20    Microalbumin, Urine  1.4         Lab Results   Component Value Date    SARSANTIGEN Not Detected 01/18/2023    COVID19 NOT DETECTED 04/05/2021    RAPFLUA Negative 01/18/2023    RAPFLUB Negative 01/18/2023    POCPREGUR Negative 05/23/2024    BILIRUBINUR Negative 05/23/2024            Assessment and Plan    Diagnoses and all orders for this visit:    1. Type 2 diabetes mellitus with hyperglycemia, without long-term current use of insulin (Primary)  Comments:  start GLP-1 as previously discussed. cont SGLT-2. restart insulin hopefullly. f/u appt in 1 month with labs.  Orders:  -     BD Pen Needle Amberly 2nd Gen 32G X 4 MM misc; Use 1 each 5 (Five) Times a Day. use as directed  Dispense: 200 each; Refill: 3  -     Continuous Glucose  (Dexcom G7 ) device; Use 1 each As Needed (hyperglycemia). Indications: Diabetes  Dispense: 1 each; Refill: 0  -     Continuous Glucose Sensor (Dexcom G7 Sensor) misc; Use 1 Device Take As Directed.  Dispense: 1 each; Refill: 2  -     Dulaglutide (Trulicity) 0.75 MG/0.5ML solution pen-injector; Inject 0.75 mg under the skin into the appropriate area as directed 1 (One) Time Per Week.  Dispense: 6 mL; Refill: 1  -     dapagliflozin Propanediol (Farxiga) 10 MG tablet; Take 10 mg by mouth Daily.  Dispense: 90 tablet; Refill: 3    2. Primary hypertension  Comments:  elevated today. hope for improvement with better control of BG.    3. Chronic wrist pain,  left  Comments:  will refer to hand surgery for management  Orders:  -     Ambulatory Referral to Hand Surgery          Medications Discontinued During This Encounter   Medication Reason    dapagliflozin Propanediol (Farxiga) 10 MG tablet Reorder    Continuous Glucose Sensor (Dexcom G7 Sensor) misc Reorder    Continuous Glucose  (Dexcom G7 ) device Reorder    Dulaglutide (Trulicity) 0.75 MG/0.5ML solution pen-injector Reorder    BD Pen Needle Amberly 2nd Gen 32G X 4 MM misc Reorder          Follow Up   Return in about 4 weeks (around 8/19/2024) for DM, HTN.  Patient was given instructions and counseling regarding her condition or for health maintenance advice. Please see specific information pulled into the AVS if appropriate.       Chidi Hobson Jr, MD  07/22/24  13:06 EDT

## 2024-08-01 ENCOUNTER — OFFICE VISIT (OUTPATIENT)
Age: 19
End: 2024-08-01
Payer: OTHER GOVERNMENT

## 2024-08-01 VITALS
OXYGEN SATURATION: 96 % | DIASTOLIC BLOOD PRESSURE: 105 MMHG | TEMPERATURE: 98.3 F | HEIGHT: 62 IN | BODY MASS INDEX: 30.97 KG/M2 | HEART RATE: 80 BPM | SYSTOLIC BLOOD PRESSURE: 140 MMHG | WEIGHT: 168.3 LBS

## 2024-08-01 DIAGNOSIS — R45.851 VERBALIZES SUICIDAL THOUGHTS: ICD-10-CM

## 2024-08-01 DIAGNOSIS — Z79.4 TYPE 2 DIABETES MELLITUS WITH HYPERGLYCEMIA, WITH LONG-TERM CURRENT USE OF INSULIN: Primary | ICD-10-CM

## 2024-08-01 DIAGNOSIS — E11.65 TYPE 2 DIABETES MELLITUS WITH HYPERGLYCEMIA, WITH LONG-TERM CURRENT USE OF INSULIN: Primary | ICD-10-CM

## 2024-08-01 LAB
EXPIRATION DATE: ABNORMAL
GLUCOSE BLDC GLUCOMTR-MCNC: 296 MG/DL (ref 70–130)
Lab: ABNORMAL

## 2024-08-01 PROCEDURE — 99214 OFFICE O/P EST MOD 30 MIN: CPT

## 2024-08-01 RX ORDER — PEN NEEDLE, DIABETIC 32GX 5/32"
1 NEEDLE, DISPOSABLE MISCELLANEOUS
Qty: 200 EACH | Refills: 3 | Status: SHIPPED | OUTPATIENT
Start: 2024-08-01

## 2024-08-01 NOTE — PATIENT INSTRUCTIONS
Take NovoLog (aspart) insulin to correct glucose levels over 125 mg/dl as follows:    (Add to mealtime insulin if ordered)-continue to take your 5 units with each meal plus the correction scale below.    If blood glucose is less than 125 mg/dl - 0 units  If blood glucose is between 126 and 150 - 1 units  If blood glucose is between 151 and 175 - 2units  If blood glucose is between 176 and 200 - 3 units  If blood glucose is between 201 and 225 - 4units  If blood glucose is between 226 and 250 - 5 units  If blood glucose is between 251 and 275 - 6 units  If blood glucose is between 276 and 300 - 7 units  If blood glucose is between 301 and 325 - 8 units  If blood glucose is between 326 and 350 - 9 units  If blood glucose is 351 or above - 10 units

## 2024-08-01 NOTE — LETTER
August 1, 2024     Patient: Amarilis Vinson   YOB: 2005   Date of Visit: 8/1/2024       To Whom It May Concern:    It is my medical opinion that Amarilis Vinson  needs to have access to her personal cellular device at all times as it is connected to her Dexcom G7 continuous glucose monitor and provides audible alarms if her blood glucose levels are high or low.  Low glucose levels are sometimes not felt and can be a medical emergency .           Sincerely,        JOSE Bliss    CC:   No Recipients

## 2024-08-01 NOTE — PROGRESS NOTES
Chief Complaint  Establish Care (Pt is a 19 yr old female presenting to Diabetes Care to establish care for diabetes management; Pt reports her BS levels have been dropping and rising from 60's up to 400's within hours - pt states she was Dx with Type 2 diabetes but has family HX (father) of Type 1. )    Referred By: Nghia Ramirez MD    Subjective          Amarilis Vinson presents to [facility] for diabetes medication management    History of Present Illness    Visit type:  to establish care  Diabetes type:  Type 2  Age at time of dx/Year of dx/Number of years: Family 2 years  Family History of Diabetes: Biological father, paternal grandmother, paternal grandfather  Current diabetes status/concerns/issues: Highly fluctuating blood glucose levels  Other current health concerns: Migraines and blood pressure  Current Diabetes symptoms:    Polyuria: Yes when blood sugars elevated   Polydipsia: Yes when blood sugar is elevated   Polyphagia: No   Blurred vision: Yes primarily when blood sugars elevated   Excessive fatigue: Yes constant  Known Diabetes complications:  Neuropathy: Numbness; Location: Hands and Bilateral  Renal: Normal eGFR per current labs  Eyes: No current eye exam available in record; Location: N/A; Last Eye Exam:  April 2024 ; Location: [facility] [place]  Amputation/Wounds: None  GI: Nausea and Other: Can keep Zofran and backpack at all times  Cardiovascular: Hypertension  ED: N/A  Other: None  Hospitalizations/ED/911 secondary to DM?  No  Hypoglycemia:  None reported at this time  Hypoglycemia Symptoms:  No hypoglycemia at this time  Current Diabetes treatment: Farxiga, Trulicity, Lantus, NovoLog  Prior diabetes treatments: Metformin, Ozempic  Using ACEI or ARB: Yes, lisinopril, Managed by other provider  Using Statin: Yes, atorvastatin, Managed by other provider  Blood glucose device:  Dexcom CGM  Blood glucose monitoring frequency:  Continuous per CGM  Blood  glucose range/average:  The 14-day sensor report shows an average glucose of 231 mg/dL, with 27% in target range ( mgdL), 36% in the high range (181-250 mg/dL), 37% in the very high range (>250 mg/dL), 0% in the low range (54-70 mg/dL) and 0% in the very low range (<54 mg/dL).  Device connected to clinic during visit, report will be downloaded.  Glucose Source: Device Reviewed  Dietary behavior:  Limits high carb/sweet foods, Avoids sugary drinks, Number of meals each day - 2-3; Number of snacks each day - 1-2  Activity/Exercise:   With work  Last Foot Exam: None  Diabetes Education Hx: None  Social Determinants of Health: None    Past Medical History:   Diagnosis Date    Anxiety     COVID 08/07/2022    NO CURRENT SYMPTOMS    Diabetes mellitus     Foot pain, left     Hypertension     Migraines      Past Surgical History:   Procedure Laterality Date    FOOT SURGERY      DISTAL LEFT FIRST METATARSAL    HARDWARE REMOVAL Left 8/26/2022    Procedure: HARDWARE REMOVAL;  left first metatarsal shaft;  Surgeon: Michel Jaffe DPM;  Location: [facility] OR;  Service: Podiatry;  Laterality: Left;    ULNAR OSTEOPLASTY  2018     Family History   Adopted: Yes   Problem Relation Age of Onset    No Known Problems Mother     No Known Problems Father      Social History     Socioeconomic History    Marital status:    Tobacco Use    Smoking status: Never    Smokeless tobacco: Never   Vaping Use    Vaping status: Never Used   Substance and Sexual Activity    Alcohol use: Not Currently    Drug use: Not Currently    Sexual activity: Yes     No Known Allergies    Current Outpatient Medications:     BD Pen Needle Amberly 2nd Gen 32G X 4 MM misc, Use 1 each 5 (Five) Times a Day. use as directed, Disp: 200 each, Rfl: 3    Alcohol Swabs pads, Use 1 Pad Daily., Disp: 100 each, Rfl: 1    aspirin-acetaminophen-caffeine (EXCEDRIN MIGRAINE) 250-250-65 MG per tablet, Take  by mouth., Disp: , Rfl:     atorvastatin (LIPITOR) 20 MG  "tablet, Take 1 tablet by mouth Every Night., Disp: 90 tablet, Rfl: 1    Continuous Glucose  (Dexcom G7 ) device, Use 1 each As Needed (hyperglycemia). Indications: Diabetes, Disp: 1 each, Rfl: 0    Continuous Glucose Sensor (Dexcom G7 Sensor) misc, Use 1 Device Take As Directed., Disp: 1 each, Rfl: 2    dapagliflozin Propanediol (Farxiga) 10 MG tablet, Take 10 mg by mouth Daily., Disp: 90 tablet, Rfl: 3    glucose blood test strip, Use three times per day as needed, Disp: 400 each, Rfl: 12    hydroCHLOROthiazide (HYDRODIURIL) 25 MG tablet, TAKE 1 TABLET BY MOUTH DAILY, Disp: 90 tablet, Rfl: 1    insulin aspart (NovoLOG FlexPen) 100 UNIT/ML solution pen-injector sc pen, Inject 5 Units under the skin into the appropriate area as directed 3 (Three) Times a Day With Meals., Disp: 15 mL, Rfl: 3    insulin glargine (Lantus) 100 UNIT/ML injection, Inject 30 Units under the skin into the appropriate area as directed Daily., Disp: 15 mL, Rfl: 1    Insulin Syringe 31G X 5/16\" 1 ML misc, Use 1 syringe Daily., Disp: 100 each, Rfl: 1    Lancets (freestyle) lancets, Use 3 times per day as needed to check blood sugars, Disp: 100 each, Rfl: 4    lisinopril (PRINIVIL,ZESTRIL) 40 MG tablet, TAKE 1 TABLET BY MOUTH DAILY, Disp: 90 tablet, Rfl: 1    meclizine (ANTIVERT) 25 MG tablet, Take 1 tablet by mouth 3 (Three) Times a Day As Needed for Dizziness., Disp: 10 tablet, Rfl: 0    Norethin-Eth Estrad-Fe Biphas (Lo Loestrin Fe) 1 MG-10 MCG / 10 MCG tablet, Take 1 tablet by mouth Daily., Disp: 28 tablet, Rfl: 3    omeprazole (priLOSEC) 20 MG capsule, Take 1 capsule by mouth Daily., Disp: , Rfl:     ondansetron ODT (ZOFRAN-ODT) 4 MG disintegrating tablet, Place 1 tablet on the tongue Every 4 (Four) Hours As Needed for Nausea or Vomiting., Disp: 15 tablet, Rfl: 0    sertraline (ZOLOFT) 100 MG tablet, Take 1 tablet by mouth Daily., Disp: 30 tablet, Rfl: 1  No current facility-administered medications for this " "visit.    Objective     Vitals:    08/01/24 1257   BP: (!) 140/105   BP Location: Right arm   Patient Position: Sitting   Cuff Size: Adult   Pulse: 80   Temp: 98.3 °F (36.8 °C)   TempSrc: Oral   SpO2: 96%   Weight: 76.3 kg (168 lb 4.8 oz)   Height: 157.5 cm (62.01\")     Body mass index is 30.77 kg/m².    Physical Exam  Constitutional:       Appearance: Normal appearance. She is obese.      Comments: Obesity (BMI 30 - 39.9) Pt Current BMI = 30.43     HENT:      Head: Normocephalic and atraumatic.      Right Ear: External ear normal.      Left Ear: External ear normal.      Nose: Nose normal.   Eyes:      Extraocular Movements: Extraocular movements intact.      Conjunctiva/sclera: Conjunctivae normal.   Pulmonary:      Effort: Pulmonary effort is normal.   Musculoskeletal:         General: Normal range of motion.      Cervical back: Normal range of motion.   Skin:     General: Skin is warm and dry.   Neurological:      General: No focal deficit present.      Mental Status: She is alert and oriented to person, place, and time. Mental status is at baseline.   Psychiatric:         Mood and Affect: Mood normal.         Behavior: Behavior normal.         Thought Content: Thought content normal.         Judgment: Judgment normal.         Result Review :   The following data was reviewed by: JOSE Bliss on 08/01/2024:    Most Recent A1C          5/14/2024    12:09   HGBA1C Most Recent   Hemoglobin A1C 9.20        A1C Last 3 Results          2/6/2024    14:28 5/14/2024    12:09   HGBA1C Last 3 Results   Hemoglobin A1C 13.70  9.20      A1c collected on 5/14/2024  is 9.2%, indicating Uncontrolled Type II diabetes.  This result is down from the prior result of 13.7% collected on 2/6/2024    Glucose   Date Value Ref Range Status   08/01/2024 296 (A) 70 - 130 mg/dL Final     Point of care glucose in the office today is  elevated at 296 mg/dL    Creatinine   Date Value Ref Range Status   05/14/2024 0.80 0.57 - 1.00 mg/dL " "Final   01/05/2024 0.76 0.57 - 1.00 mg/dL Final     eGFR   Date Value Ref Range Status   05/14/2024 107.7 >60.0 mL/min/1.73 Final   01/05/2024 115.2 >60.0 mL/min/1.73 Final     Labs collected on 5/14/2024 show Normal values    Microalbumin, Urine   Date Value Ref Range Status   02/06/2024 1.4 mg/dL Final   12/09/2022 2.4 mg/dL Final     No results found for: \"CREATININEUR\"  No results found for: \"MALBCRERATIO\"  Urine microalbuminuria collected on 2/6/2024 is negative for microalbuminuria    Total Cholesterol   Date Value Ref Range Status   05/14/2024 182 0 - 200 mg/dL Final   07/19/2023 197 0 - 200 mg/dL Final   05/06/2019 128 <=199 mg/dL Final     Comment:     Age Range       Desirable       Borderline High       High Risk  Child/Adol.    <170 mg/dL      170-199 mg/dL         >=200 mg/dL  Adult          <200 mg/dL      200-239 mg/dL         >=240 mg/dL   07/18/2018 130 <=199 mg/dL Final     Comment:     Age Range       Desirable       Borderline High       High Risk  Child/Adol.    <170 mg/dL      170-199 mg/dL         >=200 mg/dL  Adult          <200 mg/dL      200-239 mg/dL         >=240 mg/dL     Triglycerides   Date Value Ref Range Status   05/14/2024 373 (H) 0 - 150 mg/dL Final   07/19/2023 287 (H) 0 - 150 mg/dL Final   05/06/2019 124 <=129 mg/dL Final     Comment:     Age Range      Acceptable     Borderline High    High           Very High  Child(2-9Yrs)  <75 mg/dL      75-99 mg/dL       >=100 mg/dL  Adolescent  10-18 Yrs      <90 mg/dL       mg/dL      >=130 mg/dL  Adult  >18 Yrs        <150 mg/dL     150-199 mg/dL     200-499 mg/dL    >=500mg/dL   07/18/2018 84 <=129 mg/dL Final     Comment:     Age Range      Acceptable     Borderline High    High           Very High  Child(2-9Yrs)  <75 mg/dL      75-99 mg/dL       >=100 mg/dL  Adolescent  10-18 Yrs      <90 mg/dL       mg/dL      >=130 mg/dL  Adult  >18 Yrs        <150 mg/dL     150-199 mg/dL     200-499 mg/dL    >=500mg/dL     HDL " Cholesterol   Date Value Ref Range Status   05/14/2024 30 (L) 40 - 60 mg/dL Final   07/19/2023 29 (L) 40 - 60 mg/dL Final     LDL Cholesterol    Date Value Ref Range Status   05/14/2024 90 0 - 100 mg/dL Final   07/19/2023 118 (H) 0 - 100 mg/dL Final     LDL Chol Calc (NIH)   Date Value Ref Range Status   05/06/2019 75 <=129 mg/dL Final     Comment:     Age Range         Acceptable      Borderline High       High  Child/Adol.      <110 mg/dL       110-129 mg/dL        >=130 mg/dL  Adult            <100 mg/dL       100-159 mg/dL        >=160mg/dL   07/18/2018 76 <=129 mg/dL Final     Comment:     Age Range         Acceptable      Borderline High       High  Child/Adol.      <110 mg/dL       110-129 mg/dL        >=130 mg/dL  Adult            <100 mg/dL       100-159 mg/dL        >=160mg/dL     Lipid panel collected on 5/14/2024 shows Hypertriglyceridemia and low HDL            Assessment: Patient was referred by her primary care provider for diabetes management.  Patient currently in an uncontrolled status with an A1c of 9.2% as of 5/14/2024.  Patient's blood glucose at this visit was elevated at 296 mg/dL, which patient states is not abnormal for her.  Patient has been administering 5 units of NovoLog with each meal and states that she was never given a correction/sliding scale to use for elevated blood glucose.  Patient does express an interest in insulin pump therapy, primarily in an insulin pump that would communicate with her Dexcom G7.  Patient does question her diagnosis of type 2 diabetes and is requesting further testing.  Patient admits to some depression and suicidal thoughts daily without a plan.  Patient is requesting a referral to a mental health provider.      Diagnoses and all orders for this visit:    1. Type 2 diabetes mellitus with hyperglycemia, with long-term current use of insulin (Primary)  -     Insulin Antibody; Future  -     IA-2 Autoantibodies; Future  -     C-Peptide; Future  -     Glucose,  Fasting; Future  -     Hemoglobin A1c; Future  -     BD Pen Needle Amberly 2nd Gen 32G X 4 MM misc; Use 1 each 5 (Five) Times a Day. use as directed  Dispense: 200 each; Refill: 3  -     POC Glucose    2. Verbalizes suicidal thoughts  -     Ambulatory Referral to Psychiatry    Other orders  -     Cancel: POC Glycosylated Hemoglobin (Hb A1C)        Plan: Patient contracts verbally to contact 911 or present to the emergency department if suicidal thoughts become intrusive and she devises a plan.  An urgent referral was ordered for mental health appointment.  The following labs were ordered to evaluate for insurance qualification for an insulin pump: C-peptide, fasting glucose, hemoglobin A1c, IA-2 autoantibodies, and insulin antibodies.  Patient will have these labs collected tomorrow, 8/2/2024, and once results are available evaluation for insulin pump will be considered.  Patient provided a written copy of her sliding scale this visit.  Patient will contact the office if the sliding scale is too aggressive and causes hypoglycemia.  Patient is scheduled for a 1 month follow-up appointment and labs will be reviewed at that time.    The patient will monitor her blood glucose levels per CGM.  If she develops problematic hyperglycemia or hypoglycemia or adverse drug reactions, she will contact the office for further instructions.        Follow Up     Return in about 1 month (around 9/1/2024) for CGM Follow-Up, Medication Management.    Patient was given instructions and counseling regarding her condition or for health maintenance advice. Please see specific information pulled into the AVS if appropriate.     Walter Tate, JOSE  08/01/2024      Dictated Utilizing Dragon Dictation.  Please note that portions of this note were completed with a voice recognition program.  Part of this note may be an electronic transcription/translation of spoken language to printed text using the Dragon Dictation System.

## 2024-08-01 NOTE — LETTER
August 1, 2024     Patient: Amarilis Vinson   YOB: 2005   Date of Visit: 8/1/2024       To Whom It May Concern:    It is my medical opinion that Amarilis Vinson  will need to be excused from work on 8/2/2024 in order to have pertinent labs collected at [facility] .           Sincerely,        JOSE Bliss    CC:   No Recipients

## 2024-08-02 ENCOUNTER — LAB (OUTPATIENT)
Dept: LAB | Facility: HOSPITAL | Age: 19
End: 2024-08-02
Payer: OTHER GOVERNMENT

## 2024-08-02 DIAGNOSIS — E11.65 TYPE 2 DIABETES MELLITUS WITH HYPERGLYCEMIA, WITH LONG-TERM CURRENT USE OF INSULIN: ICD-10-CM

## 2024-08-02 DIAGNOSIS — Z79.4 TYPE 2 DIABETES MELLITUS WITH HYPERGLYCEMIA, WITH LONG-TERM CURRENT USE OF INSULIN: ICD-10-CM

## 2024-08-02 DIAGNOSIS — R45.851 SUICIDAL THOUGHTS: Primary | ICD-10-CM

## 2024-08-02 LAB
GLUCOSE P FAST SERPL-MCNC: 194 MG/DL (ref 74–106)
HBA1C MFR BLD: 9.4 % (ref 4.8–5.6)

## 2024-08-02 PROCEDURE — 86337 INSULIN ANTIBODIES: CPT

## 2024-08-02 PROCEDURE — 36415 COLL VENOUS BLD VENIPUNCTURE: CPT

## 2024-08-02 PROCEDURE — 82947 ASSAY GLUCOSE BLOOD QUANT: CPT

## 2024-08-02 PROCEDURE — 86341 ISLET CELL ANTIBODY: CPT

## 2024-08-02 PROCEDURE — 84681 ASSAY OF C-PEPTIDE: CPT

## 2024-08-02 PROCEDURE — 83036 HEMOGLOBIN GLYCOSYLATED A1C: CPT

## 2024-08-04 LAB — C PEPTIDE SERPL-MCNC: 4.1 NG/ML (ref 1.1–4.4)

## 2024-08-05 ENCOUNTER — OFFICE VISIT (OUTPATIENT)
Dept: BEHAVIORAL HEALTH | Facility: CLINIC | Age: 19
End: 2024-08-05
Payer: OTHER GOVERNMENT

## 2024-08-05 VITALS
SYSTOLIC BLOOD PRESSURE: 141 MMHG | HEART RATE: 77 BPM | HEIGHT: 62 IN | WEIGHT: 169.2 LBS | BODY MASS INDEX: 31.14 KG/M2 | DIASTOLIC BLOOD PRESSURE: 106 MMHG

## 2024-08-05 DIAGNOSIS — F33.2 SEVERE EPISODE OF RECURRENT MAJOR DEPRESSIVE DISORDER, WITHOUT PSYCHOTIC FEATURES: Primary | ICD-10-CM

## 2024-08-05 DIAGNOSIS — F41.1 GENERALIZED ANXIETY DISORDER: ICD-10-CM

## 2024-08-05 NOTE — LETTER
August 5, 2024     Patient: Amarilis Vinson   YOB: 2005   Date of Visit: 8/5/2024       To Whom it May Concern:    Amarilis Vinson was seen in my clinic on 8/5/2024. We are currently discussing options for leave of absence versus short-term disability and FMLA for approximately 4-6 weeks.     If you have any questions or concerns, please don't hesitate to call.         Sincerely,          JOSE Rodriguez
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
## 2024-08-09 ENCOUNTER — TELEPHONE (OUTPATIENT)
Dept: PSYCHIATRY | Facility: CLINIC | Age: 19
End: 2024-08-09
Payer: OTHER GOVERNMENT

## 2024-08-09 NOTE — TELEPHONE ENCOUNTER
PT(PATIENT) VERIFIED     PT(PATIENT) STATES HER PHARMACY DOESN'T HAVE AN ORDER FOR ABILIFY     PER MOST RECENT OFFICE VISIT   Office Visit with Valencia Golden APRN (2024)   Start abilify 2 mg daily     ALSO, PT(PATIENT) REQUESTED AN UPDATE ON STD OR FMLA BECAUSE SHE IS NOW OFF WORK     PT(PATIENT) ADVISED A LETTER WAS COMPLETED FOR HER Letter from Valencia Godlen APRN (2024)     PT(PATIENT) CONFIRMED SHE DID GIVE Letter from Valencia Golden APRN (2024)  TO HER EMPLOYER     PT(PATIENT) STATES SINCE SHE HASN'T BEEN THERE A YEAR YET, THAT THERE ISN'T ANY ADDITIONAL PAPERWORK FOR HER TO GET COMPLETED SINCE SHE ISN'T ELIGABLE      PT(PATIENT) STATES SHE DOESN'T KNOW WHAT ELSE TO DO NOW     PT(PATIENT) STATES SHE IS NOW CONSIDERING DISABILITY SINCE SHE IS OUT OF REAL OPTIONS WITH HER CURRENT EMPLOYER     PLEASE ADVISE

## 2024-08-11 RX ORDER — ARIPIPRAZOLE 2 MG/1
2 TABLET ORAL DAILY
Qty: 30 TABLET | Refills: 1 | Status: SHIPPED | OUTPATIENT
Start: 2024-08-11

## 2024-08-12 LAB
INSULIN AB SER-ACNC: <5 UU/ML
ISLET CELL512 AB SER-ACNC: <7.5 U/ML

## 2024-08-12 NOTE — TELEPHONE ENCOUNTER
Medication sent to patient's preferred pharmacy in record.  Unfortunately, we cannot offer protection for her current employment if she does not qualify through the human resources department for short-term disability or FMLA.  We can assist as needed in the process if she would like to apply for disability.  However, we cannot promise that she will receive approval.

## 2024-08-12 NOTE — PATIENT INSTRUCTIONS
1.  Please return to clinic at your next scheduled visit.  Please contact the clinic ([phone]) at least 24 hours prior in the event you need to cancel.  2.  Do no harm to yourself or others.    3.  Avoid alcohol and drugs.    4.  Take all medications as prescribed.  Please contact the clinic with any concerns. If you are in need of medication refills, please call the clinic at [phone].    5. Should you want to get in touch with your provider, JOSE Rodriguez, please contact the office ([phone]), and staff will be able to page Valencia directly.  6. In the event you have personal crisis, contact the following crisis numbers: Suicide Prevention Hotline 1-909.200.7036; [facility] Helpline 9-407-061-HMKF; [facility] Emergency Room [phone]; text HELLO to 476407; or 606.     SPECIFIC RECOMMENDATIONS:     1.      Medications discussed at this encounter:     New Medications Ordered This Visit   Medications    ARIPiprazole (Abilify) 2 MG tablet     Sig: Take 1 tablet by mouth Daily.     Dispense:  30 tablet     Refill:  1                       2.      Psychotherapy recommendations: We will continue therapy at future visits.     3.     Return to clinic: Return in about 1 month (around 9/5/2024).

## 2024-08-13 DIAGNOSIS — E11.65 TYPE 2 DIABETES MELLITUS WITH HYPERGLYCEMIA, WITHOUT LONG-TERM CURRENT USE OF INSULIN: ICD-10-CM

## 2024-08-13 RX ORDER — ACYCLOVIR 400 MG/1
1 TABLET ORAL TAKE AS DIRECTED
Qty: 1 EACH | Refills: 2 | Status: SHIPPED | OUTPATIENT
Start: 2024-08-13
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
## 2024-08-14 ENCOUNTER — TELEPHONE (OUTPATIENT)
Dept: INTERNAL MEDICINE | Facility: CLINIC | Age: 19
End: 2024-08-14

## 2024-08-14 ENCOUNTER — OFFICE VISIT (OUTPATIENT)
Dept: INTERNAL MEDICINE | Facility: CLINIC | Age: 19
End: 2024-08-14
Payer: OTHER GOVERNMENT

## 2024-08-14 VITALS
TEMPERATURE: 97.4 F | BODY MASS INDEX: 32.09 KG/M2 | WEIGHT: 174.4 LBS | DIASTOLIC BLOOD PRESSURE: 90 MMHG | HEART RATE: 87 BPM | HEIGHT: 62 IN | OXYGEN SATURATION: 100 % | SYSTOLIC BLOOD PRESSURE: 132 MMHG

## 2024-08-14 DIAGNOSIS — R55 SYNCOPE, UNSPECIFIED SYNCOPE TYPE: ICD-10-CM

## 2024-08-14 DIAGNOSIS — R00.2 PALPITATIONS: ICD-10-CM

## 2024-08-14 DIAGNOSIS — E11.65 TYPE 2 DIABETES MELLITUS WITH HYPERGLYCEMIA, WITHOUT LONG-TERM CURRENT USE OF INSULIN: Primary | ICD-10-CM

## 2024-08-14 DIAGNOSIS — E78.5 HYPERLIPIDEMIA, UNSPECIFIED HYPERLIPIDEMIA TYPE: ICD-10-CM

## 2024-08-14 DIAGNOSIS — F41.9 ANXIETY: ICD-10-CM

## 2024-08-14 DIAGNOSIS — F33.0 MAJOR DEPRESSIVE DISORDER, RECURRENT EPISODE, MILD DEGREE: ICD-10-CM

## 2024-08-14 DIAGNOSIS — K21.9 GASTROESOPHAGEAL REFLUX DISEASE, UNSPECIFIED WHETHER ESOPHAGITIS PRESENT: ICD-10-CM

## 2024-08-14 DIAGNOSIS — I10 PRIMARY HYPERTENSION: ICD-10-CM

## 2024-08-14 LAB
ALBUMIN SERPL-MCNC: 4.3 G/DL (ref 3.5–5.2)
ALBUMIN/GLOB SERPL: 1.3 G/DL
ALP SERPL-CCNC: 86 U/L (ref 39–117)
ALT SERPL W P-5'-P-CCNC: 33 U/L (ref 1–33)
ANION GAP SERPL CALCULATED.3IONS-SCNC: 12.2 MMOL/L (ref 5–15)
AST SERPL-CCNC: 51 U/L (ref 1–32)
BASOPHILS # BLD AUTO: 0.04 10*3/MM3 (ref 0–0.2)
BASOPHILS NFR BLD AUTO: 0.4 % (ref 0–1.5)
BILIRUB SERPL-MCNC: 0.2 MG/DL (ref 0–1.2)
BUN SERPL-MCNC: 16 MG/DL (ref 6–20)
BUN/CREAT SERPL: 22.9 (ref 7–25)
CALCIUM SPEC-SCNC: 10.1 MG/DL (ref 8.6–10.5)
CHLORIDE SERPL-SCNC: 100 MMOL/L (ref 98–107)
CHOLEST SERPL-MCNC: 235 MG/DL (ref 0–200)
CO2 SERPL-SCNC: 21.8 MMOL/L (ref 22–29)
CREAT SERPL-MCNC: 0.7 MG/DL (ref 0.57–1)
DEPRECATED RDW RBC AUTO: 40.6 FL (ref 37–54)
EGFRCR SERPLBLD CKD-EPI 2021: 126.4 ML/MIN/1.73
EOSINOPHIL # BLD AUTO: 0.18 10*3/MM3 (ref 0–0.4)
EOSINOPHIL NFR BLD AUTO: 1.7 % (ref 0.3–6.2)
ERYTHROCYTE [DISTWIDTH] IN BLOOD BY AUTOMATED COUNT: 12.7 % (ref 12.3–15.4)
GLOBULIN UR ELPH-MCNC: 3.3 GM/DL
GLUCOSE SERPL-MCNC: 180 MG/DL (ref 65–99)
HBA1C MFR BLD: 9.2 % (ref 4.8–5.6)
HCT VFR BLD AUTO: 42.4 % (ref 34–46.6)
HDLC SERPL-MCNC: 37 MG/DL (ref 40–60)
HGB BLD-MCNC: 14.3 G/DL (ref 12–15.9)
IMM GRANULOCYTES # BLD AUTO: 0.06 10*3/MM3 (ref 0–0.05)
IMM GRANULOCYTES NFR BLD AUTO: 0.6 % (ref 0–0.5)
LDLC SERPL CALC-MCNC: 114 MG/DL (ref 0–100)
LDLC/HDLC SERPL: 2.76 {RATIO}
LYMPHOCYTES # BLD AUTO: 3.24 10*3/MM3 (ref 0.7–3.1)
LYMPHOCYTES NFR BLD AUTO: 31.1 % (ref 19.6–45.3)
MCH RBC QN AUTO: 29.6 PG (ref 26.6–33)
MCHC RBC AUTO-ENTMCNC: 33.7 G/DL (ref 31.5–35.7)
MCV RBC AUTO: 87.8 FL (ref 79–97)
MONOCYTES # BLD AUTO: 0.61 10*3/MM3 (ref 0.1–0.9)
MONOCYTES NFR BLD AUTO: 5.9 % (ref 5–12)
NEUTROPHILS NFR BLD AUTO: 6.28 10*3/MM3 (ref 1.7–7)
NEUTROPHILS NFR BLD AUTO: 60.3 % (ref 42.7–76)
NRBC BLD AUTO-RTO: 0 /100 WBC (ref 0–0.2)
PLATELET # BLD AUTO: 311 10*3/MM3 (ref 140–450)
PMV BLD AUTO: 10.6 FL (ref 6–12)
POTASSIUM SERPL-SCNC: 4.8 MMOL/L (ref 3.5–5.2)
PROT SERPL-MCNC: 7.6 G/DL (ref 6–8.5)
RBC # BLD AUTO: 4.83 10*6/MM3 (ref 3.77–5.28)
SODIUM SERPL-SCNC: 134 MMOL/L (ref 136–145)
T4 FREE SERPL-MCNC: 0.99 NG/DL (ref 0.92–1.68)
TRIGL SERPL-MCNC: 480 MG/DL (ref 0–150)
TSH SERPL DL<=0.05 MIU/L-ACNC: 6.46 UIU/ML (ref 0.27–4.2)
VLDLC SERPL-MCNC: 84 MG/DL (ref 5–40)
WBC NRBC COR # BLD AUTO: 10.41 10*3/MM3 (ref 3.4–10.8)

## 2024-08-14 PROCEDURE — 84443 ASSAY THYROID STIM HORMONE: CPT | Performed by: NURSE PRACTITIONER

## 2024-08-14 PROCEDURE — 80061 LIPID PANEL: CPT | Performed by: NURSE PRACTITIONER

## 2024-08-14 PROCEDURE — 84439 ASSAY OF FREE THYROXINE: CPT | Performed by: NURSE PRACTITIONER

## 2024-08-14 PROCEDURE — 83036 HEMOGLOBIN GLYCOSYLATED A1C: CPT | Performed by: NURSE PRACTITIONER

## 2024-08-14 PROCEDURE — 80053 COMPREHEN METABOLIC PANEL: CPT | Performed by: NURSE PRACTITIONER

## 2024-08-14 PROCEDURE — 85025 COMPLETE CBC W/AUTO DIFF WBC: CPT | Performed by: NURSE PRACTITIONER

## 2024-08-14 RX ORDER — OMEPRAZOLE 20 MG/1
20 CAPSULE, DELAYED RELEASE ORAL DAILY
Qty: 90 CAPSULE | Refills: 1 | Status: SHIPPED | OUTPATIENT
Start: 2024-08-14

## 2024-08-14 NOTE — ASSESSMENT & PLAN NOTE
Continue to follow with psychiatry for medication management. She will discuss concern for bipolar disorder at upcoming visit.

## 2024-08-14 NOTE — PROGRESS NOTES
"Chief Complaint  Establish Care (New patient ) and Syncope (Has been blacking out quite frequently)    Subjective      Amarilis Vinson is a 19 y.o. female who presents to [facility]     Previous PCP: Dr. Hobson  Last labs: 8/2024  LMP: 7/2024  PAP: Never  Mammogram: Denies family history of breast cancer  Hepatitis C screening: Completed   Colonoscopy: Denies family history of colon cancer  COVID19 vaccination: Declines   Eye exam: 2024  Dental exam: Up to date   Smoking history: Denies   Specialists: Endocrinology; Psychiatry      DM2-  Most recent A1c 9.4, patient was out of medication at that time per previous PCP note. She is currently managed with Farxiga, Lantus 30 units in the AM, Novolog TID (typically 15 units). Did not like the GLP1 and stopped this. Reports fasting glucose readings often over 200. Has CGM. Is working on getting an insulin pump. She is scheduled with endocrinology next month. Diabetic eye exam: [facility] [place]. Diabetic foot exam: Due. Urine microalbumin: 2/2024. Renal protection: ACE. Pneumonia vaccination: Up to date.     HLD-  Most recent LDL 90. Patient continues statin.     HTN-  Managed with lisinopril. She does not check her blood pressure at home. Denies chest pain, blurry vision, headache, leg swelling.     GERD-  Managed with omeprazole.     Anxiety/depression-  Currently managed with Abilify and Zoloft. Followed by psychiatry, most recent visit two weeks ago. Patient just started Abilify last night, had issues getting from the pharmacy. Patient states since middle school she has been \"blacking out\". States she can hear when this happens but cannot see or move. States she will start to get disoriented and know that it is happening. States this is occurring three times a day now. States her  has found her on the ground. She is not sure how long this lasts. Maybe five minutes at the most. Her  was not able to " "wake her up initially. States someone picked her up, she could feel the movement but not move or speak. Will feel groggy when she wakes up, states she will feel disoriented. States yesterday she was getting groceries and she blacked out in the aisle and woke up on the floor. States when she woke up she tried to get in the passenger side of her vehicle. States she will know where she is but also not know where she is. Will have heart racing before this happens, will get dizzy and then she knows to sit down. Had a tilt table test scheduled in the past but did not go. Was diagnosed with Grave's in middle school. States her dad is bipolar and had similar symptoms. She is wondering if this is what is happening to her. States she would like to pursue disability until she can get her symptoms figured out because she is not able to work. She is wondering about short term disability, she does not have a current employer.     Objective   Vital Signs:   Vitals:    08/14/24 0859   BP: 132/90   BP Location: Left arm   Patient Position: Sitting   Cuff Size: Adult   Pulse: 87   Temp: 97.4 °F (36.3 °C)   TempSrc: Temporal   SpO2: 100%   Weight: 79.1 kg (174 lb 6.4 oz)   Height: 157.5 cm (62.01\")     Body mass index is 31.89 kg/m².    Wt Readings from Last 3 Encounters:   08/14/24 79.1 kg (174 lb 6.4 oz)   08/05/24 76.7 kg (169 lb 3.2 oz)   08/01/24 76.3 kg (168 lb 4.8 oz)     BP Readings from Last 3 Encounters:   08/14/24 132/90   08/05/24 (!) 141/106   08/01/24 (!) 140/105       Health Maintenance   Topic Date Due    Hepatitis B (4 of 4 - 4-dose series) 2005    DIABETIC EYE EXAM  Never done    PAP SMEAR  Never done    ANNUAL PHYSICAL  10/12/2023    INFLUENZA VACCINE  08/01/2024    COVID-19 Vaccine (1 - 2023-24 season) 08/16/2024 (Originally 9/1/2023)    CHLAMYDIA SCREENING  01/05/2025    HEMOGLOBIN A1C  02/02/2025    URINE MICROALBUMIN  02/06/2025    LIPID PANEL  05/14/2025    BMI FOLLOWUP  07/22/2025    DIABETIC FOOT EXAM  " 08/14/2025    TDAP/TD VACCINES (3 - Td or Tdap) 02/27/2034    HEPATITIS C SCREENING  Completed    Pneumococcal Vaccine 0-64  Completed    MENINGOCOCCAL VACCINE  Completed    HPV VACCINES  Completed       Physical Exam  Constitutional:       Appearance: Normal appearance.   HENT:      Head: Normocephalic and atraumatic.      Right Ear: Tympanic membrane normal.      Left Ear: Tympanic membrane normal.      Nose: Nose normal.      Mouth/Throat:      Mouth: Mucous membranes are moist.      Pharynx: Oropharynx is clear.   Eyes:      Extraocular Movements: Extraocular movements intact.      Conjunctiva/sclera: Conjunctivae normal.      Pupils: Pupils are equal, round, and reactive to light.   Neck:      Thyroid: No thyroid mass, thyromegaly or thyroid tenderness.   Cardiovascular:      Rate and Rhythm: Normal rate and regular rhythm.      Heart sounds: Normal heart sounds.   Pulmonary:      Effort: Pulmonary effort is normal.      Breath sounds: Normal breath sounds.   Musculoskeletal:        Feet:    Skin:     General: Skin is warm and dry.   Neurological:      General: No focal deficit present.      Mental Status: She is alert and oriented to person, place, and time.   Psychiatric:         Mood and Affect: Mood normal.         Behavior: Behavior normal.         Thought Content: Thought content normal.          Result Review :  The following data was reviewed by: JOSE Mcadams on 08/14/2024:           ECG 12 Lead    Date/Time: 8/14/2024 12:48 PM  Performed by: Helen Haines RegSched Rep    Authorized by: Ruthie Segal APRN  Rhythm: sinus rhythm  Rate: normal  BPM: 74    Clinical impression: normal ECG                Assessment & Plan  Type 2 diabetes mellitus with hyperglycemia, without long-term current use of insulin  She will continue to follow with endocrinology for medication management. She should monitor blood glucose levels closely and call or return to clinic with consistent elevations or frequent  roxy. Repeat labs in clinic today. Diabetic eye exam: [facility] [place]. Diabetic foot exam: Today. Urine microalbumin: 2/2024. Renal protection: ACE. Pneumonia vaccination: Up to date.   Primary hypertension  Mild elevation today, continue lisinopril. She should monitor blood pressure at home and call or return to clinic with consistent elevations greater than 130/80. Labs in clinic today.  Major depressive disorder, recurrent episode, mild degree  Continue to follow with psychiatry for medication management. She will discuss concern for bipolar disorder at upcoming visit.  Anxiety    Syncope, unspecified syncope type  EKG today without concern. Discussed recommendation for cardiac monitor and tilt table test. Patient declines, would first like to discuss potential for bipolar disorder with psychiatry. She will notify clinic if she would like to consider further testing. She is aware that short term disability is offered only through employers and that she would need to contact a disability clinic if she decides to pursue something more long term.   Palpitations    Gastroesophageal reflux disease, unspecified whether esophagitis present  Well controlled, continue PPI.   Hyperlipidemia, unspecified hyperlipidemia type  Continue statin, goal LDL less than 70. Will consider dosage adjustment based on results of labs today.    Orders Placed This Encounter   Procedures    Comprehensive Metabolic Panel    Hemoglobin A1c    Lipid Panel    TSH    T4, Free    CBC Auto Differential    ECG 12 Lead    CBC & Differential     New Medications Ordered This Visit   Medications    omeprazole (priLOSEC) 20 MG capsule     Sig: Take 1 capsule by mouth Daily.     Dispense:  90 capsule     Refill:  1         FOLLOW UP  Return if symptoms worsen or fail to improve.  Patient was given instructions and counseling regarding her condition or for health maintenance advice. Please see specific information pulled into the AVS if  "appropriate.       Ruthie Segal, APRN  08/14/24  12:49 EDT    CURRENT & DISCONTINUED MEDICATIONS  Current Outpatient Medications   Medication Instructions    Alcohol Swabs pads 1 Pad, Does not apply, Daily    ARIPiprazole (ABILIFY) 2 mg, Oral, Daily    aspirin-acetaminophen-caffeine (EXCEDRIN MIGRAINE) 250-250-65 MG per tablet Oral    atorvastatin (LIPITOR) 20 mg, Oral, Nightly    BD Pen Needle Amberly 2nd Gen 32G X 4 MM misc 1 each, Does not apply, 5 Times Daily, use as directed    Continuous Glucose Sensor (Dexcom G7 Sensor) misc 1 Device, Does not apply, Take As Directed    dapagliflozin Propanediol (FARXIGA) 10 mg, Oral, Daily    glucose blood test strip Use three times per day as needed    hydroCHLOROthiazide 25 mg, Oral, Daily    insulin glargine (LANTUS) 30 Units, Subcutaneous, Daily    Insulin Syringe 31G X 5/16\" 1 ML misc 1 syringe, Does not apply, Daily    Lancets (freestyle) lancets Use 3 times per day as needed to check blood sugars    lisinopril (PRINIVIL,ZESTRIL) 40 mg, Oral, Daily    meclizine (ANTIVERT) 25 mg, Oral, 3 Times Daily PRN    Norethin-Eth Estrad-Fe Biphas (Lo Loestrin Fe) 1 MG-10 MCG / 10 MCG tablet 1 tablet, Oral, Daily    NovoLOG FlexPen 5 Units, Subcutaneous, 3 Times Daily With Meals    omeprazole (PRILOSEC) 20 mg, Oral, Daily    ondansetron ODT (ZOFRAN-ODT) 4 mg, Translingual, Every 4 Hours PRN    sertraline (ZOLOFT) 100 mg, Oral, Daily       Medications Discontinued During This Encounter   Medication Reason    Continuous Glucose  (Dexcom G7 ) device Duplicate order    omeprazole (priLOSEC) 20 MG capsule Reorder          "

## 2024-08-14 NOTE — ASSESSMENT & PLAN NOTE
She will continue to follow with endocrinology for medication management. She should monitor blood glucose levels closely and call or return to clinic with consistent elevations or frequent lows. Repeat labs in clinic today. Diabetic eye exam: [facility] [place]. Diabetic foot exam: Today. Urine microalbumin: 2/2024. Renal protection: ACE. Pneumonia vaccination: Up to date.

## 2024-08-14 NOTE — ASSESSMENT & PLAN NOTE
Mild elevation today, continue lisinopril. She should monitor blood pressure at home and call or return to clinic with consistent elevations greater than 130/80. Labs in clinic today.

## 2024-08-16 DIAGNOSIS — R79.89 ELEVATED TSH: Primary | ICD-10-CM

## 2024-08-23 DIAGNOSIS — E11.65 TYPE 2 DIABETES MELLITUS WITH HYPERGLYCEMIA, WITHOUT LONG-TERM CURRENT USE OF INSULIN: ICD-10-CM

## 2024-08-23 DIAGNOSIS — E11.65 TYPE 2 DIABETES MELLITUS WITH HYPERGLYCEMIA, WITHOUT LONG-TERM CURRENT USE OF INSULIN: Primary | ICD-10-CM

## 2024-08-23 NOTE — TELEPHONE ENCOUNTER
You pended the regular glucometer and the sensors for the Dexcom which is the continuous monitor that sticks on her arm. Which does she need?

## 2024-08-23 NOTE — TELEPHONE ENCOUNTER
Pharmacy called office stating patient is needing a glucose monitor, I have pended order for you.

## 2024-08-26 RX ORDER — BLOOD-GLUCOSE METER
1 KIT MISCELLANEOUS DAILY
Qty: 1 EACH | Refills: 0 | Status: SHIPPED | OUTPATIENT
Start: 2024-08-26

## 2024-08-26 RX ORDER — ACYCLOVIR 400 MG/1
1 TABLET ORAL TAKE AS DIRECTED
Qty: 1 EACH | Refills: 2 | Status: SHIPPED | OUTPATIENT
Start: 2024-08-26

## 2024-08-26 NOTE — TELEPHONE ENCOUNTER
Pharmacy stated the script for dexcom didn't came thru and they were needing us to resend it. They also stated she was needing a glucose monitor.

## 2024-08-27 DIAGNOSIS — R00.2 PALPITATIONS: ICD-10-CM

## 2024-08-27 DIAGNOSIS — R55 SYNCOPE, UNSPECIFIED SYNCOPE TYPE: Primary | ICD-10-CM

## 2024-08-28 ENCOUNTER — OFFICE VISIT (OUTPATIENT)
Dept: BEHAVIORAL HEALTH | Facility: CLINIC | Age: 19
End: 2024-08-28
Payer: COMMERCIAL

## 2024-08-28 VITALS
HEIGHT: 62 IN | HEART RATE: 96 BPM | DIASTOLIC BLOOD PRESSURE: 119 MMHG | WEIGHT: 179.5 LBS | BODY MASS INDEX: 33.03 KG/M2 | SYSTOLIC BLOOD PRESSURE: 161 MMHG

## 2024-08-28 DIAGNOSIS — F33.2 SEVERE EPISODE OF RECURRENT MAJOR DEPRESSIVE DISORDER, WITHOUT PSYCHOTIC FEATURES: Primary | ICD-10-CM

## 2024-08-28 DIAGNOSIS — E11.65 TYPE 2 DIABETES MELLITUS WITH HYPERGLYCEMIA, WITHOUT LONG-TERM CURRENT USE OF INSULIN: ICD-10-CM

## 2024-08-28 DIAGNOSIS — F41.1 GENERALIZED ANXIETY DISORDER: ICD-10-CM

## 2024-08-28 DIAGNOSIS — F31.81 BIPOLAR II DISORDER: ICD-10-CM

## 2024-08-28 RX ORDER — LURASIDONE HYDROCHLORIDE 20 MG/1
20 TABLET, FILM COATED ORAL DAILY
Qty: 30 TABLET | Refills: 2 | Status: SHIPPED | OUTPATIENT
Start: 2024-08-28

## 2024-08-28 NOTE — PATIENT INSTRUCTIONS
1.  Please return to clinic at your next scheduled visit.  Please contact the clinic ([phone]) at least 24 hours prior in the event you need to cancel.  2.  Do no harm to yourself or others.    3.  Avoid alcohol and drugs.    4.  Take all medications as prescribed.  Please contact the clinic with any concerns. If you are in need of medication refills, please call the clinic at [phone].    5. Should you want to get in touch with your provider, JOSE Rodriguez, please contact the office ([phone]), and staff will be able to page [place] directly.  6. In the event you have personal crisis, contact the following crisis numbers: Suicide Prevention Hotline 1-792.317.8168; [facility] Helpline 0-403-465-JZLE; [facility] Emergency Room [phone]; text HELLO to 796268; or 341.     SPECIFIC RECOMMENDATIONS:     1.      Medications discussed at this encounter:     New Medications Ordered This Visit   Medications    Lurasidone HCl (Latuda) 20 MG tablet tablet     Sig: Take 1 tablet by mouth Daily.     Dispense:  30 tablet     Refill:  2                       2.      Psychotherapy recommendations: We will continue therapy at future visits.     3.     Return to clinic: No follow-ups on file.
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
## 2024-08-31 ENCOUNTER — PRIOR AUTHORIZATION (OUTPATIENT)
Dept: INTERNAL MEDICINE | Facility: CLINIC | Age: 19
End: 2024-08-31
Payer: COMMERCIAL

## 2024-09-03 ENCOUNTER — OFFICE VISIT (OUTPATIENT)
Dept: BEHAVIORAL HEALTH | Facility: CLINIC | Age: 19
End: 2024-09-03
Payer: COMMERCIAL

## 2024-09-03 DIAGNOSIS — F41.9 ANXIETY: ICD-10-CM

## 2024-09-03 DIAGNOSIS — F33.2 SEVERE EPISODE OF RECURRENT MAJOR DEPRESSIVE DISORDER, WITHOUT PSYCHOTIC FEATURES: Primary | ICD-10-CM

## 2024-09-03 PROCEDURE — 90837 PSYTX W PT 60 MINUTES: CPT | Performed by: SOCIAL WORKER

## 2024-09-04 ENCOUNTER — OFFICE VISIT (OUTPATIENT)
Dept: BEHAVIORAL HEALTH | Facility: CLINIC | Age: 19
End: 2024-09-04
Payer: COMMERCIAL

## 2024-09-04 VITALS
BODY MASS INDEX: 32.39 KG/M2 | HEIGHT: 62 IN | WEIGHT: 176 LBS | HEART RATE: 85 BPM | SYSTOLIC BLOOD PRESSURE: 159 MMHG | DIASTOLIC BLOOD PRESSURE: 122 MMHG

## 2024-09-04 DIAGNOSIS — F41.1 GENERALIZED ANXIETY DISORDER: ICD-10-CM

## 2024-09-04 DIAGNOSIS — F31.81 BIPOLAR II DISORDER: Primary | ICD-10-CM

## 2024-09-04 DIAGNOSIS — F60.89 CLUSTER B PERSONALITY DISORDER: ICD-10-CM

## 2024-09-04 RX ORDER — LAMOTRIGINE 25 MG/1
25 TABLET ORAL DAILY
Qty: 30 TABLET | Refills: 2 | Status: SHIPPED | OUTPATIENT
Start: 2024-09-04 | End: 2025-09-04
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
## 2024-09-04 NOTE — LETTER
September 4, 2024     Patient: Amarilis Vinson   YOB: 2005   Date of Visit: 9/4/2024       To Whom it May Concern:    Amarilis Vinson was seen in my clinic on 9/4/2024. We are currently discussing date extension for leave of absence versus short-term disability and FMLA for approximately 4-6 additional weeks.     If you have any questions or concerns, please don't hesitate to call.         Sincerely,          JOSE Rodriguez

## 2024-09-04 NOTE — PATIENT INSTRUCTIONS
1.  Please return to clinic at your next scheduled visit.  Please contact the clinic ([phone]) at least 24 hours prior in the event you need to cancel.  2.  Do no harm to yourself or others.    3.  Avoid alcohol and drugs.    4.  Take all medications as prescribed.  Please contact the clinic with any concerns. If you are in need of medication refills, please call the clinic at [phone].    5. Should you want to get in touch with your provider, JOSE Rodriguez, please contact the office ([phone]), and staff will be able to page [place] directly.  6. In the event you have personal crisis, contact the following crisis numbers: Suicide Prevention Hotline 1-266.552.7505; [facility] Helpline 9-364-455-MGCG; [facility] Emergency Room [phone]; text HELLO to 879644; or 527.     SPECIFIC RECOMMENDATIONS:     1.      Medications discussed at this encounter:     New Medications Ordered This Visit   Medications    lamoTRIgine (LaMICtal) 25 MG tablet     Sig: Take 1 tablet by mouth Daily.     Dispense:  30 tablet     Refill:  2                       2.      Psychotherapy recommendations: We will continue therapy at future visits.     3.     Return to clinic: Return in about 3 weeks (around 9/25/2024).

## 2024-09-04 NOTE — PROGRESS NOTES
Chief Complaint  Diabetes (Follow up, med mgt, /)    Referred By: No ref. provider found    Subjective          Amarilis Vinson presents to [facility] for diabetes medication management    History of Present Illness    Visit type:  follow-up  Diabetes type:  Type 2  Current diabetes status/concerns/issues: My blood sugars have remained highly variable and I have increased my NovoLog up to 35 units with each meal.  Other health concerns:  appointment with mental health nurse practitioner for severe recurrent depression, discussed possibility of going inpatient for a short period  Current Diabetes symptoms:   Polyuria: Yes when blood sugars elevated              Polydipsia: Yes when blood sugar is elevated              Polyphagia: No              Blurred vision: Yes primarily when blood sugars elevated              Excessive fatigue: Yes constant   Known Diabetes complications:  Neuropathy: Numbness; Location: Hands and Bilateral  Renal: No current urine microalbumin available and Normal eGFR per current labs  Eyes: No Retinopathy reported on current eye exam; Location: N/A  Amputation/Wounds: None  GI: Nausea  Cardiovascular: Hypertension, Hyperlipidemia, Hypercholesterolemia, and Hypertriglyceridemia  ED: N/A  Other: None  Hypoglycemia:  None reported at this time  Hypoglycemia Symptoms:  No hypoglycemia at this time  Current diabetes treatment:  Farxiga, Lantus, NovoLog  Blood glucose device:  Dexcom CGM  Blood glucose monitoring frequency:  Continuous per CGM  Blood glucose range/average:  The 14-day sensor report shows an average glucose of 259 mg/dL, with 16% in target range ( mgdL), 30% in the high range (181-250 mg/dL), 54% in the very high range (>250 mg/dL), 0% in the low range (54-70 mg/dL) and 0% in the very low range (<54 mg/dL).   Glucose Source: Device Reviewed  Diet:  Limits high carb/sweet foods, Avoids sugary drinks, Number of meals each day - 2-3; Number of  snacks each day - 1-2  Activity/Exercise:   Active with work    Past Medical History:   Diagnosis Date    Anxiety     COVID 08/07/2022    NO CURRENT SYMPTOMS    Diabetes mellitus     Foot pain, left     Hypertension     Migraines      Past Surgical History:   Procedure Laterality Date    FOOT SURGERY      DISTAL LEFT FIRST METATARSAL    HARDWARE REMOVAL Left 8/26/2022    Procedure: HARDWARE REMOVAL;  left first metatarsal shaft;  Surgeon: Michel Jaffe DPM;  Location: [facility] MAIN OR;  Service: Podiatry;  Laterality: Left;    ULNAR OSTEOPLASTY  2018     Family History   Adopted: Yes   Problem Relation Age of Onset    No Known Problems Mother     No Known Problems Father      Social History     Socioeconomic History    Marital status:    Tobacco Use    Smoking status: Never    Smokeless tobacco: Never   Vaping Use    Vaping status: Never Used   Substance and Sexual Activity    Alcohol use: Not Currently    Drug use: Not Currently    Sexual activity: Yes     No Known Allergies    Current Outpatient Medications:     Alcohol Swabs pads, Use 1 Pad Daily., Disp: 100 each, Rfl: 1    aspirin-acetaminophen-caffeine (EXCEDRIN MIGRAINE) 250-250-65 MG per tablet, Take  by mouth., Disp: , Rfl:     atorvastatin (LIPITOR) 20 MG tablet, Take 1 tablet by mouth Every Night., Disp: 90 tablet, Rfl: 1    BD Pen Needle Amberly 2nd Gen 32G X 4 MM misc, Use 1 each 5 (Five) Times a Day. use as directed, Disp: 200 each, Rfl: 3    dapagliflozin Propanediol (Farxiga) 10 MG tablet, Take 10 mg by mouth Daily., Disp: 90 tablet, Rfl: 3    glucose monitor monitoring kit, Use 1 each Daily., Disp: 1 each, Rfl: 0    hydroCHLOROthiazide (HYDRODIURIL) 25 MG tablet, TAKE 1 TABLET BY MOUTH DAILY, Disp: 90 tablet, Rfl: 1    insulin aspart (NovoLOG FlexPen) 100 UNIT/ML solution pen-injector sc pen, Inject 35 Units under the skin into the appropriate area as directed 3 (Three) Times a Day With Meals for 180 days., Disp: 30 mL, Rfl: 5    Insulin  "Syringe 31G X 5/16\" 1 ML misc, Use 1 syringe Daily., Disp: 100 each, Rfl: 1    lamoTRIgine (LaMICtal) 25 MG tablet, Take 1 tablet by mouth Daily., Disp: 30 tablet, Rfl: 2    Lancets (freestyle) lancets, Use 3 times per day as needed to check blood sugars, Disp: 100 each, Rfl: 4    lisinopril (PRINIVIL,ZESTRIL) 40 MG tablet, TAKE 1 TABLET BY MOUTH DAILY, Disp: 90 tablet, Rfl: 1    Lurasidone HCl (Latuda) 20 MG tablet tablet, Take 1 tablet by mouth Daily., Disp: 30 tablet, Rfl: 2    meclizine (ANTIVERT) 25 MG tablet, Take 1 tablet by mouth 3 (Three) Times a Day As Needed for Dizziness., Disp: 10 tablet, Rfl: 0    Norethin-Eth Estrad-Fe Biphas (Lo Loestrin Fe) 1 MG-10 MCG / 10 MCG tablet, Take 1 tablet by mouth Daily., Disp: 28 tablet, Rfl: 3    omeprazole (priLOSEC) 20 MG capsule, Take 1 capsule by mouth Daily., Disp: 90 capsule, Rfl: 1    ondansetron ODT (ZOFRAN-ODT) 4 MG disintegrating tablet, Place 1 tablet on the tongue Every 4 (Four) Hours As Needed for Nausea or Vomiting., Disp: 15 tablet, Rfl: 0    sertraline (ZOLOFT) 100 MG tablet, Take 1 tablet by mouth Daily., Disp: 30 tablet, Rfl: 1    Continuous Glucose Sensor (Dexcom G7 Sensor) misc, Use 1 each Every 10 (Ten) Days for 180 days., Disp: 3 each, Rfl: 5    glucose blood (OneTouch Verio) test strip, Use as instructed for verification and calibration of DEXCOM G7 up to three times daily., Disp: 100 each, Rfl: 11    Insulin Glargine (Lantus SoloStar) 100 UNIT/ML injection pen, Inject 30 Units under the skin into the appropriate area as directed 2 (Two) Times a Day for 180 days., Disp: 18 mL, Rfl: 5    Objective     Vitals:    09/05/24 1259   BP: (!) 147/110   BP Location: Right arm   Patient Position: Sitting   Cuff Size: Adult   Pulse: 94   Temp: 98.2 °F (36.8 °C)   TempSrc: Infrared   SpO2: 98%   Weight: 79 kg (174 lb 3.2 oz)   Height: 157.5 cm (62.01\")     Body mass index is 31.85 kg/m².    Physical Exam  Constitutional:       Appearance: Normal appearance. " She is obese.      Comments: Obesity (BMI 30 - 39.9) Pt Current BMI = 31.85     HENT:      Head: Normocephalic and atraumatic.      Right Ear: External ear normal.      Left Ear: External ear normal.      Nose: Nose normal.   Eyes:      Extraocular Movements: Extraocular movements intact.      Conjunctiva/sclera: Conjunctivae normal.   Pulmonary:      Effort: Pulmonary effort is normal.   Musculoskeletal:         General: Normal range of motion.      Cervical back: Normal range of motion.   Skin:     General: Skin is warm and dry.   Neurological:      General: No focal deficit present.      Mental Status: She is alert and oriented to person, place, and time. Mental status is at baseline.   Psychiatric:         Mood and Affect: Mood normal.         Behavior: Behavior normal.         Thought Content: Thought content normal.         Judgment: Judgment normal.             Result Review :   The following data was reviewed by: JOSE Bliss on 09/05/2024:    Most Recent A1C          8/14/2024    10:41   HGBA1C Most Recent   Hemoglobin A1C 9.20        A1C Last 3 Results          5/14/2024    12:09 8/2/2024    09:40 8/14/2024    10:41   HGBA1C Last 3 Results   Hemoglobin A1C 9.20  9.40  9.20      A1c collected on 8/14/2024  is 9.2%, indicating Uncontrolled Type II diabetes.  This result is down from the prior result of 9.4% collected on 8/2/2024    Glucose   Date Value Ref Range Status   08/01/2024 296 (A) 70 - 130 mg/dL Final     Creatinine   Date Value Ref Range Status   08/14/2024 0.70 0.57 - 1.00 mg/dL Final   05/14/2024 0.80 0.57 - 1.00 mg/dL Final     eGFR   Date Value Ref Range Status   08/14/2024 126.4 >60.0 mL/min/1.73 Final   05/14/2024 107.7 >60.0 mL/min/1.73 Final     Labs collected on 8/14/2024 show Normal values    Microalbumin, Urine   Date Value Ref Range Status   02/06/2024 1.4 mg/dL Final   12/09/2022 2.4 mg/dL Final     Total Cholesterol   Date Value Ref Range Status   08/14/2024 235 (H) 0 - 200  mg/dL Final   05/14/2024 182 0 - 200 mg/dL Final   05/06/2019 128 <=199 mg/dL Final     Comment:     Age Range       Desirable       Borderline High       High Risk  Child/Adol.    <170 mg/dL      170-199 mg/dL         >=200 mg/dL  Adult          <200 mg/dL      200-239 mg/dL         >=240 mg/dL   07/18/2018 130 <=199 mg/dL Final     Comment:     Age Range       Desirable       Borderline High       High Risk  Child/Adol.    <170 mg/dL      170-199 mg/dL         >=200 mg/dL  Adult          <200 mg/dL      200-239 mg/dL         >=240 mg/dL     Triglycerides   Date Value Ref Range Status   08/14/2024 480 (H) 0 - 150 mg/dL Final   05/14/2024 373 (H) 0 - 150 mg/dL Final   05/06/2019 124 <=129 mg/dL Final     Comment:     Age Range      Acceptable     Borderline High    High           Very High  Child(2-9Yrs)  <75 mg/dL      75-99 mg/dL       >=100 mg/dL  Adolescent  10-18 Yrs      <90 mg/dL       mg/dL      >=130 mg/dL  Adult  >18 Yrs        <150 mg/dL     150-199 mg/dL     200-499 mg/dL    >=500mg/dL   07/18/2018 84 <=129 mg/dL Final     Comment:     Age Range      Acceptable     Borderline High    High           Very High  Child(2-9Yrs)  <75 mg/dL      75-99 mg/dL       >=100 mg/dL  Adolescent  10-18 Yrs      <90 mg/dL       mg/dL      >=130 mg/dL  Adult  >18 Yrs        <150 mg/dL     150-199 mg/dL     200-499 mg/dL    >=500mg/dL     HDL Cholesterol   Date Value Ref Range Status   08/14/2024 37 (L) 40 - 60 mg/dL Final   05/14/2024 30 (L) 40 - 60 mg/dL Final     LDL Cholesterol    Date Value Ref Range Status   08/14/2024 114 (H) 0 - 100 mg/dL Final   05/14/2024 90 0 - 100 mg/dL Final     LDL Chol Calc (NIH)   Date Value Ref Range Status   05/06/2019 75 <=129 mg/dL Final     Comment:     Age Range         Acceptable      Borderline High       High  Child/Adol.      <110 mg/dL       110-129 mg/dL        >=130 mg/dL  Adult            <100 mg/dL       100-159 mg/dL        >=160mg/dL   07/18/2018 76 <=129 mg/dL  Final     Comment:     Age Range         Acceptable      Borderline High       High  Child/Adol.      <110 mg/dL       110-129 mg/dL        >=130 mg/dL  Adult            <100 mg/dL       100-159 mg/dL        >=160mg/dL     Lipid panel collected on 8/14/2024 shows Hyperlipidemia, Hypercholesterolemia, Hypertriglyceridemia, and low HDL            Assessment: Patient presents for a 1 month follow-up.  Dexcom G7 report was downloaded and shows an average glucose of 259 mg/dL, with 16% in target range ( mgdL), 30% in the high range (181-250 mg/dL), 54% in the very high range (>250 mg/dL), 0% in the low range (54-70 mg/dL) and 0% in the very low range (<54 mg/dL).  Patient expresses an interest in insulin pump therapy.  Patient states that she has increased her NovoLog up to 35 units with each meal and blood glucose remains elevated.  Patient states she was diagnosed with Graves' disease while in middle school and questions how that would impact blood glucose.  Patient has been taking insulin as she starts to eat rather than 15 to 20 minutes prior to eating her meal which can contribute to postprandial hyperglycemic excursions.  Patient has experienced a 5 pound weight loss since her last visit with a current BMI of 31.85 kg/m².  Patient is hypertensive at today's visit with a blood pressure of 147/110, which is down from the blood pressure at her office visit yesterday of 159/122.  Patient denies headache, blurry vision, or other troublesome side effects.      Diagnoses and all orders for this visit:    1. Type 2 diabetes mellitus with hyperglycemia, with long-term current use of insulin (Primary)  -     Continuous Glucose Sensor (Dexcom G7 Sensor) misc; Use 1 each Every 10 (Ten) Days for 180 days.  Dispense: 3 each; Refill: 5  -     glucose blood (OneTouch Verio) test strip; Use as instructed for verification and calibration of DEXCOM G7 up to three times daily.  Dispense: 100 each; Refill: 11  -     insulin  aspart (NovoLOG FlexPen) 100 UNIT/ML solution pen-injector sc pen; Inject 35 Units under the skin into the appropriate area as directed 3 (Three) Times a Day With Meals for 180 days.  Dispense: 30 mL; Refill: 5  -     Insulin Glargine (Lantus SoloStar) 100 UNIT/ML injection pen; Inject 30 Units under the skin into the appropriate area as directed 2 (Two) Times a Day for 180 days.  Dispense: 18 mL; Refill: 5    2. Dyslipidemia with low high density lipoprotein (HDL) cholesterol with hypertriglyceridemia due to type 2 diabetes mellitus    3. Primary hypertension    4. Major depressive disorder, recurrent episode, mild degree    5. Class 1 obesity due to excess calories with serious comorbidity and body mass index (BMI) of 32.0 to 32.9 in adult    6. Uses self-applied continuous glucose monitoring device    7. Type 2 diabetes mellitus with hyperglycemia, without long-term current use of insulin        Plan: Renewed prescription for Dexcom G7 and One Touch Verio test strips for verification and calibration.  Discussed the impact that Graves' disease can have on blood glucose and insulin resistance.  Encourage patient to take her NovoLog 15 to 20 minutes prior to each meal to more closely match how her body processes food.  Reviewed multiple insulin pumps available on the market to include Tandem t:slim X2, Tandem Mobi, OmniPod 5, and Beta Bionics iLet Pancreas.  After discussion patient states she would like to pursue the Tandem t:slim X2 insulin pump.  Provider will contact the local device rep to start the ordering process.  Encourage patient continue focus on her diet and physical activity to assist with improvement in glycemic control and to promote further weight loss.  Patient is instructed to call the office to set up an appointment with the nurse educator for an insulin pump start when she receives the pump from the DME supplier.  Patient is scheduled for a follow-up in 2 months, an A1c will be collected, and  CGM report reviewed.      The patient will monitor her blood glucose levels per CGM.  If she develops problematic hyperglycemia or hypoglycemia or adverse drug reactions, she will contact the office for further instructions.        Follow Up     Return in about 2 months (around 11/5/2024) for Medication Management, CGM Follow-Up.    Patient was given instructions and counseling regarding her condition or for health maintenance advice. Please see specific information pulled into the AVS if appropriate.     Walter Tate, JOSE  09/05/2024      Dictated Utilizing Dragon Dictation.  Please note that portions of this note were completed with a voice recognition program.  Part of this note may be an electronic transcription/translation of spoken language to printed text using the Dragon Dictation System.

## 2024-09-04 NOTE — LETTER
"September 4, 2024     Patient: Amarilis Vinson   YOB: 2005   Date of Visit: 9/4/2024   To Whom it May Concern:    My name is JOSE Rodriguez.  I am a board-certified psychiatric mental health nurse practitioner.  The above is my patient and has been under my care since August 2024. I am familiar with this patient’s history and with the functional limitations imposed by their mental health diagnoses below.     ICD-10-CM ICD-9-CM   Bipolar II disorder  F31.81 296.89   Generalized anxiety disorder  F41.1 300.02   Cluster B personality disorder  F60.89 301.89     Due to these diagnoses, this patient has certain limitations coping with what would otherwise be considered normal, but significant, day-to-day situations.  This letter is written to request possible accommodations, for her , Humberto Lopez, as reasonably necessary, to help alleviate these challenges and to enhance her day to day functionality.  Accommodations requested include the implementation of a \"phone profile\" to allow for intermittent communication with his wife to check on her well-being.      I am licensed by the [facility] to practice psychiatric mental health nursing as an advanced practice registered nurse. My license number is [id number], and it was issued on July 29, 2022. Please contact me if you have any questions or concerns.      Sincerely,          JOSE Rodriguez, PMHNP-BC  "

## 2024-09-05 ENCOUNTER — OFFICE VISIT (OUTPATIENT)
Age: 19
End: 2024-09-05
Payer: COMMERCIAL

## 2024-09-05 VITALS
HEIGHT: 62 IN | OXYGEN SATURATION: 98 % | DIASTOLIC BLOOD PRESSURE: 110 MMHG | WEIGHT: 174.2 LBS | HEART RATE: 94 BPM | TEMPERATURE: 98.2 F | SYSTOLIC BLOOD PRESSURE: 147 MMHG | BODY MASS INDEX: 32.06 KG/M2

## 2024-09-05 DIAGNOSIS — E78.2 DYSLIPIDEMIA WITH LOW HIGH DENSITY LIPOPROTEIN (HDL) CHOLESTEROL WITH HYPERTRIGLYCERIDEMIA DUE TO TYPE 2 DIABETES MELLITUS: ICD-10-CM

## 2024-09-05 DIAGNOSIS — Z79.4 TYPE 2 DIABETES MELLITUS WITH HYPERGLYCEMIA, WITH LONG-TERM CURRENT USE OF INSULIN: Primary | ICD-10-CM

## 2024-09-05 DIAGNOSIS — F33.0 MAJOR DEPRESSIVE DISORDER, RECURRENT EPISODE, MILD DEGREE: ICD-10-CM

## 2024-09-05 DIAGNOSIS — Z97.8 USES SELF-APPLIED CONTINUOUS GLUCOSE MONITORING DEVICE: ICD-10-CM

## 2024-09-05 DIAGNOSIS — E11.65 TYPE 2 DIABETES MELLITUS WITH HYPERGLYCEMIA, WITH LONG-TERM CURRENT USE OF INSULIN: Primary | ICD-10-CM

## 2024-09-05 DIAGNOSIS — Z78.9 USES BIRTH CONTROL: ICD-10-CM

## 2024-09-05 DIAGNOSIS — I10 PRIMARY HYPERTENSION: ICD-10-CM

## 2024-09-05 DIAGNOSIS — E11.69 DYSLIPIDEMIA WITH LOW HIGH DENSITY LIPOPROTEIN (HDL) CHOLESTEROL WITH HYPERTRIGLYCERIDEMIA DUE TO TYPE 2 DIABETES MELLITUS: ICD-10-CM

## 2024-09-05 DIAGNOSIS — E11.65 TYPE 2 DIABETES MELLITUS WITH HYPERGLYCEMIA, WITHOUT LONG-TERM CURRENT USE OF INSULIN: ICD-10-CM

## 2024-09-05 DIAGNOSIS — E66.09 CLASS 1 OBESITY DUE TO EXCESS CALORIES WITH SERIOUS COMORBIDITY AND BODY MASS INDEX (BMI) OF 32.0 TO 32.9 IN ADULT: ICD-10-CM

## 2024-09-05 RX ORDER — ACYCLOVIR 400 MG/1
1 TABLET ORAL
Qty: 3 EACH | Refills: 5 | Status: SHIPPED | OUTPATIENT
Start: 2024-09-05 | End: 2025-03-04

## 2024-09-05 RX ORDER — INSULIN ASPART 100 [IU]/ML
35 INJECTION, SOLUTION INTRAVENOUS; SUBCUTANEOUS
Qty: 30 ML | Refills: 5 | Status: SHIPPED | OUTPATIENT
Start: 2024-09-05 | End: 2025-03-04

## 2024-09-05 RX ORDER — BLOOD SUGAR DIAGNOSTIC
STRIP MISCELLANEOUS
Qty: 100 EACH | Refills: 11 | Status: SHIPPED | OUTPATIENT
Start: 2024-09-05

## 2024-09-05 RX ORDER — INSULIN GLARGINE 100 [IU]/ML
30 INJECTION, SOLUTION SUBCUTANEOUS 2 TIMES DAILY
Qty: 18 ML | Refills: 5 | Status: SHIPPED | OUTPATIENT
Start: 2024-09-05 | End: 2025-03-04

## 2024-09-05 NOTE — PATIENT INSTRUCTIONS
Over patch for Dexcom G7:    The name of the over patch that I have had great success for the Dexcom G7 is called Lexcam and they are available on [employer] for approximately $17 for 20, which is a 200-day supply.

## 2024-09-06 ENCOUNTER — PATIENT ROUNDING (BHMG ONLY) (OUTPATIENT)
Dept: DIABETES SERVICES | Facility: HOSPITAL | Age: 19
End: 2024-09-06
Payer: COMMERCIAL

## 2024-09-06 DIAGNOSIS — Z78.9 USES BIRTH CONTROL: ICD-10-CM

## 2024-09-06 RX ORDER — NORETHINDRONE ACETATE AND ETHINYL ESTRADIOL, ETHINYL ESTRADIOL AND FERROUS FUMARATE 1MG-10(24)
1 KIT ORAL DAILY
Qty: 28 TABLET | Refills: 3 | OUTPATIENT
Start: 2024-09-06

## 2024-09-06 NOTE — PROGRESS NOTES
September 6, 2024    Hello, may I speak with Amarilis Vinson?    My name is Amina Shanks      I am  with [facility]  [address] [place], [place] [address] [address] [phone].    Before we get started may I verify your date of birth? 2005    I am calling to officially welcome you to our practice and ask about your recent visit. Is this a good time to talk? yes    Tell me about your visit with us. What things went well?  very helpful       We're always looking for ways to make our patients' experiences even better. Do you have recommendations on ways we may improve?  no    Overall were you satisfied with your first visit to our practice? yes       I appreciate you taking the time to speak with me today. Is there anything else I can do for you? no      Thank you, and have a great day.

## 2024-09-06 NOTE — TELEPHONE ENCOUNTER
Caller: Amarilis Vinson    Relationship: Self    Best call back number: [phone]     Requested Prescriptions:   Requested Prescriptions     Pending Prescriptions Disp Refills    Norethin-Eth Estrad-Fe Biphas (Lo Loestrin Fe) 1 MG-10 MCG / 10 MCG tablet 28 tablet 3     Sig: Take 1 tablet by mouth Daily.        Pharm[facility] where [facility] be sent[id number] - [place], [place] - [address] [facility] AT [facility] OF [address] & [place] - [phone] [facility] [phone] FX     Last office visit with prescribing clinician: 8/14/2024   Last telemedicine visit with prescribing clinician: Visit date not found   Next office visit with prescribing clinician: Visit date not found     Additional details provided by patient: PATIENT NEEDS THIS CALLED IN TODAY IF POSSIBLE     Does the patient have less than a 3 day supply:  [] Yes  [x] No        Rubi Huffman, PCT   09/06/24 10:40 EDT

## 2024-09-09 RX ORDER — NORETHINDRONE ACETATE AND ETHINYL ESTRADIOL, ETHINYL ESTRADIOL AND FERROUS FUMARATE 1MG-10(24)
1 KIT ORAL DAILY
Qty: 28 TABLET | Refills: 3 | Status: SHIPPED | OUTPATIENT
Start: 2024-09-09

## 2024-09-10 ENCOUNTER — PRIOR AUTHORIZATION (OUTPATIENT)
Dept: DIABETES SERVICES | Facility: HOSPITAL | Age: 19
End: 2024-09-10
Payer: COMMERCIAL

## 2024-09-23 RX ORDER — SERTRALINE HYDROCHLORIDE 100 MG/1
100 TABLET, FILM COATED ORAL DAILY
Qty: 30 TABLET | Refills: 1 | OUTPATIENT
Start: 2024-09-23

## 2024-09-24 ENCOUNTER — OFFICE VISIT (OUTPATIENT)
Dept: BEHAVIORAL HEALTH | Facility: CLINIC | Age: 19
End: 2024-09-24
Payer: COMMERCIAL

## 2024-09-24 VITALS
OXYGEN SATURATION: 97 % | SYSTOLIC BLOOD PRESSURE: 170 MMHG | WEIGHT: 182 LBS | HEIGHT: 62 IN | BODY MASS INDEX: 33.49 KG/M2 | HEART RATE: 84 BPM | DIASTOLIC BLOOD PRESSURE: 126 MMHG

## 2024-09-24 DIAGNOSIS — F60.89 CLUSTER B PERSONALITY DISORDER: ICD-10-CM

## 2024-09-24 DIAGNOSIS — F41.1 GENERALIZED ANXIETY DISORDER: ICD-10-CM

## 2024-09-24 DIAGNOSIS — F31.81 BIPOLAR II DISORDER: Primary | ICD-10-CM

## 2024-09-24 PROCEDURE — 99214 OFFICE O/P EST MOD 30 MIN: CPT

## 2024-09-24 PROCEDURE — 90833 PSYTX W PT W E/M 30 MIN: CPT

## 2024-09-24 RX ORDER — LAMOTRIGINE 25 MG/1
50 TABLET ORAL DAILY
Qty: 60 TABLET | Refills: 2 | Status: SHIPPED | OUTPATIENT
Start: 2024-09-24 | End: 2025-09-24

## 2024-09-24 RX ORDER — SERTRALINE HYDROCHLORIDE 100 MG/1
100 TABLET, FILM COATED ORAL DAILY
Qty: 30 TABLET | Refills: 1 | Status: SHIPPED | OUTPATIENT
Start: 2024-09-24

## 2024-09-24 RX ORDER — LURASIDONE HYDROCHLORIDE 40 MG/1
40 TABLET, FILM COATED ORAL DAILY
Qty: 30 TABLET | Refills: 1 | Status: SHIPPED | OUTPATIENT
Start: 2024-09-24

## 2024-09-25 ENCOUNTER — OFFICE VISIT (OUTPATIENT)
Dept: INTERNAL MEDICINE | Facility: CLINIC | Age: 19
End: 2024-09-25
Payer: COMMERCIAL

## 2024-09-25 ENCOUNTER — EDUCATION (OUTPATIENT)
Dept: DIABETES SERVICES | Facility: HOSPITAL | Age: 19
End: 2024-09-25
Payer: COMMERCIAL

## 2024-09-25 VITALS
DIASTOLIC BLOOD PRESSURE: 98 MMHG | HEART RATE: 80 BPM | HEIGHT: 62 IN | TEMPERATURE: 97.4 F | WEIGHT: 184 LBS | SYSTOLIC BLOOD PRESSURE: 162 MMHG | BODY MASS INDEX: 33.86 KG/M2 | OXYGEN SATURATION: 97 %

## 2024-09-25 DIAGNOSIS — G43.909 MIGRAINE WITHOUT STATUS MIGRAINOSUS, NOT INTRACTABLE, UNSPECIFIED MIGRAINE TYPE: Primary | ICD-10-CM

## 2024-09-25 DIAGNOSIS — R55 SYNCOPE, UNSPECIFIED SYNCOPE TYPE: ICD-10-CM

## 2024-09-25 DIAGNOSIS — F31.81 BIPOLAR 2 DISORDER: ICD-10-CM

## 2024-09-25 DIAGNOSIS — E10.9 DIABETES MELLITUS TYPE 1, CONTROLLED, WITHOUT COMPLICATIONS: Primary | ICD-10-CM

## 2024-09-25 DIAGNOSIS — I10 PRIMARY HYPERTENSION: ICD-10-CM

## 2024-09-25 DIAGNOSIS — R79.89 ELEVATED TSH: ICD-10-CM

## 2024-09-25 DIAGNOSIS — R41.3 MEMORY CHANGES: ICD-10-CM

## 2024-09-25 DIAGNOSIS — H53.9 VISION CHANGES: ICD-10-CM

## 2024-09-25 LAB
ALBUMIN SERPL-MCNC: 4.6 G/DL (ref 3.5–5.2)
ALBUMIN/GLOB SERPL: 1.4 G/DL
ALP SERPL-CCNC: 115 U/L (ref 39–117)
ALT SERPL W P-5'-P-CCNC: 55 U/L (ref 1–33)
ANION GAP SERPL CALCULATED.3IONS-SCNC: 15.6 MMOL/L (ref 5–15)
AST SERPL-CCNC: 78 U/L (ref 1–32)
BILIRUB SERPL-MCNC: <0.2 MG/DL (ref 0–1.2)
BUN SERPL-MCNC: 11 MG/DL (ref 6–20)
BUN/CREAT SERPL: 16.4 (ref 7–25)
CALCIUM SPEC-SCNC: 10.5 MG/DL (ref 8.6–10.5)
CHLORIDE SERPL-SCNC: 95 MMOL/L (ref 98–107)
CO2 SERPL-SCNC: 21.4 MMOL/L (ref 22–29)
CREAT SERPL-MCNC: 0.67 MG/DL (ref 0.57–1)
EGFRCR SERPLBLD CKD-EPI 2021: 127.7 ML/MIN/1.73
GLOBULIN UR ELPH-MCNC: 3.2 GM/DL
GLUCOSE SERPL-MCNC: 281 MG/DL (ref 65–99)
POTASSIUM SERPL-SCNC: 4.1 MMOL/L (ref 3.5–5.2)
PROT SERPL-MCNC: 7.8 G/DL (ref 6–8.5)
SODIUM SERPL-SCNC: 132 MMOL/L (ref 136–145)
T4 FREE SERPL-MCNC: 0.91 NG/DL (ref 0.92–1.68)
TSH SERPL DL<=0.05 MIU/L-ACNC: 4.75 UIU/ML (ref 0.27–4.2)

## 2024-09-25 PROCEDURE — 99214 OFFICE O/P EST MOD 30 MIN: CPT | Performed by: NURSE PRACTITIONER

## 2024-09-25 PROCEDURE — 84439 ASSAY OF FREE THYROXINE: CPT | Performed by: NURSE PRACTITIONER

## 2024-09-25 PROCEDURE — 80053 COMPREHEN METABOLIC PANEL: CPT | Performed by: NURSE PRACTITIONER

## 2024-09-25 PROCEDURE — 84443 ASSAY THYROID STIM HORMONE: CPT | Performed by: NURSE PRACTITIONER

## 2024-09-25 RX ORDER — CHLORTHALIDONE 25 MG/1
25 TABLET ORAL DAILY
Qty: 90 TABLET | OUTPATIENT
Start: 2024-09-25

## 2024-09-25 RX ORDER — TOPIRAMATE 25 MG/1
25 TABLET, FILM COATED ORAL DAILY
Qty: 30 TABLET | Refills: 1 | Status: SHIPPED | OUTPATIENT
Start: 2024-09-25

## 2024-09-25 RX ORDER — CHLORTHALIDONE 25 MG/1
25 TABLET ORAL DAILY
Qty: 30 TABLET | Refills: 1 | Status: SHIPPED | OUTPATIENT
Start: 2024-09-25

## 2024-09-26 ENCOUNTER — TELEPHONE (OUTPATIENT)
Dept: PSYCHIATRY | Facility: CLINIC | Age: 19
End: 2024-09-26
Payer: COMMERCIAL

## 2024-09-26 DIAGNOSIS — F33.0 MAJOR DEPRESSIVE DISORDER, RECURRENT EPISODE, MILD DEGREE: Primary | ICD-10-CM

## 2024-09-26 NOTE — TELEPHONE ENCOUNTER
PATIENT CALLED AND SAID THAT ROBERT DANIELS CANCELLED HER ASSESSMENT FOR YESTERDAY AND MOVED IT TO TODAY.  PT STATES THAT THE PERSON WHO IS WORKING TODAY TOLD HER THAT THEY CANNOT DO A VIRTUAL ASSESSMENT AND SHE HAS TO COME IN PERSON DUE TO HER HAVING SUICIDAL THOUGHTS.  PT SAYS THAT SHE CANNOT GO IN PERSON BECAUSE SHE FEARS THEY WOULD FORCE HER TO DO IN PATIENT TREATMENT.  PATIENT SAYS THAT THE ADMISSIONS PERSON SAID THAT THE PROVIDER MAKING THIS RECOMMENDATION HAS TO CALL AND SPEAK DIRECTLY TO ADMISSIONS.

## 2024-09-26 NOTE — TELEPHONE ENCOUNTER
Please let patient know that I have spoken with Abhinav RN, at [facility] and they have agreed to perform remote assessment. She should be receiving a phone call within the next few hours.

## 2024-09-26 NOTE — TELEPHONE ENCOUNTER
I ATTEMPTED TO CONTACT PT VIA TELEPHONE.    NO ANSWER.    VOICEMAIL MESSAGE LEFT REQUESTING THAT PT CALL OUR OFFICE BACK AND I ALSO INDICATED OF THE AGREEMENT FROM LT AND PT SHOULD BE RECEIVING A CALL.

## 2024-10-01 ENCOUNTER — EDUCATION (OUTPATIENT)
Dept: DIABETES SERVICES | Facility: HOSPITAL | Age: 19
End: 2024-10-01
Payer: COMMERCIAL

## 2024-10-01 DIAGNOSIS — E03.9 HYPOTHYROIDISM, UNSPECIFIED TYPE: Primary | ICD-10-CM

## 2024-10-01 DIAGNOSIS — E10.9 DIABETES MELLITUS TYPE 1, CONTROLLED, WITHOUT COMPLICATIONS: Primary | ICD-10-CM

## 2024-10-01 DIAGNOSIS — E87.1 HYPONATREMIA: ICD-10-CM

## 2024-10-01 RX ORDER — LEVOTHYROXINE SODIUM 25 UG/1
25 TABLET ORAL
Qty: 90 TABLET | OUTPATIENT
Start: 2024-10-01

## 2024-10-01 RX ORDER — LEVOTHYROXINE SODIUM 25 UG/1
25 TABLET ORAL
Qty: 30 TABLET | Refills: 1 | Status: SHIPPED | OUTPATIENT
Start: 2024-10-01

## 2024-10-01 NOTE — PROGRESS NOTES
Patient here for Tandem insulin pump follow-up.  We discussed placing carbohydrates and blood glucose in pump.  She has been placing carbohydrates in pump. Her pump report reveals her average blood glucose is 253, she is using her %, she is in control IQ 97% of time. She stated she is having to change her cartridge every day.  I asked her to see Rasheed GARCIA Thursday in [place] in order for him to exam report and make changes as feels is necessary.   Patient did not have any questions.

## 2024-10-03 ENCOUNTER — TELEPHONE (OUTPATIENT)
Dept: DIABETES SERVICES | Facility: HOSPITAL | Age: 19
End: 2024-10-03

## 2024-10-03 ENCOUNTER — TELEMEDICINE (OUTPATIENT)
Age: 19
End: 2024-10-03
Payer: COMMERCIAL

## 2024-10-03 DIAGNOSIS — Z79.4 TYPE 2 DIABETES MELLITUS WITH HYPERGLYCEMIA, WITH LONG-TERM CURRENT USE OF INSULIN: Primary | ICD-10-CM

## 2024-10-03 DIAGNOSIS — E11.65 TYPE 2 DIABETES MELLITUS WITH HYPERGLYCEMIA, WITH LONG-TERM CURRENT USE OF INSULIN: Primary | ICD-10-CM

## 2024-10-03 DIAGNOSIS — Z97.8 USES SELF-APPLIED CONTINUOUS GLUCOSE MONITORING DEVICE: ICD-10-CM

## 2024-10-03 DIAGNOSIS — Z96.41 INSULIN PUMP IN PLACE: ICD-10-CM

## 2024-10-03 RX ORDER — INSULIN LISPRO 200 [IU]/ML
80 INJECTION, SOLUTION SUBCUTANEOUS
Qty: 110 ML | Refills: 1 | Status: SHIPPED | OUTPATIENT
Start: 2024-10-03 | End: 2024-10-07

## 2024-10-03 RX ORDER — INSULIN ASPART 100 [IU]/ML
35 INJECTION, SOLUTION INTRAVENOUS; SUBCUTANEOUS
Qty: 30 ML | Refills: 5 | Status: SHIPPED | OUTPATIENT
Start: 2024-10-03 | End: 2025-04-01

## 2024-10-03 NOTE — TELEPHONE ENCOUNTER
Caller: Amarilis Vinson    Relationship to patient: Self    Best call back number: [phone]     Patient is needing: PATIENT STATES THAT SHE IS HAVING ISSUES WITH DEXCOM CONNECTING WITH PUMP. SHE IS REQUESTING TO SPEAK WITH EUNICE IF POSSIBLE.

## 2024-10-03 NOTE — PROGRESS NOTES
"Chief Complaint  Blood Sugar Problem, Diabetes, Hypertension, Hyperlipidemia, and Obesity    Referred By: No ref. provider found    Subjective          Mode of visit: Video  Location of patient: Home  You have chosen to receive care through a telehealth visit.  Does the patient consent to use of video/audio connection for medical care today: Yes  The visit includes audio and video interaction.  No technical issues occurred during this visit.    Amarilis Vinson presents to [facility] for insulin pump management    History of Present Illness    Visit type:  follow-up  Diabetes type:  Type 2  Current diabetes status/concerns/issues: Utilizing high doses of insulin but blood glucose remaining elevated  Other health concerns: \"Stomach bug\"  Current Diabetes symptoms:    Polyuria: Yes when blood glucose elevated   Polydipsia: Yes when blood glucose elevated   Polyphagia: No   Blurred vision: Yes only when blood glucose elevated   Excessive fatigue: Yes always tired  Known Diabetes complications:  Neuropathy: Numbness; Location: Hands and Bilateral  Renal: No current urine microalbumin available and Normal eGFR per current labs  Eyes: No Retinopathy reported on current eye exam; Location: Bilateral  Amputation/Wounds: None  GI: Nausea  Cardiovascular: Hypertension, Hyperlipidemia, Hypercholesterolemia, and Hypertriglyceridemia  ED: N/A  Other: None  Hypoglycemia:  None reported at this time  Hypoglycemia Symptoms:  No hypoglycemia at this time  Diabetes treatment: NovoLog via insulin pump  Blood glucose device:  Dexcom CGM  Blood glucose monitoring frequency:  Continuous per CGM  Blood glucose range/average:  The 14-day sensor report shows an average glucose of 252 mg/dL, with 27% in target range ( mgdL), 22% in the high range (181-250 mg/dL), 52% in the very high range (>250 mg/dL), 0% in the low range (54-70 mg/dL) and 0% in the very low range (<54 mg/dL).   Glucose Source: Device " Reviewed  Diet:  Carbohydrate Counting, Number of meals each day - 2-3; Number of snacks each day - 1-2  Activity/Exercise:   Active with work    Past Medical History:   Diagnosis Date    Anxiety     COVID 08/07/2022    NO CURRENT SYMPTOMS    Diabetes mellitus     Foot pain, left     Hypertension     Migraines      Past Surgical History:   Procedure Laterality Date    FOOT SURGERY      DISTAL LEFT FIRST METATARSAL    HARDWARE REMOVAL Left 8/26/2022    Procedure: HARDWARE REMOVAL;  left first metatarsal shaft;  Surgeon: Michel Jaffe DPM;  Location: [facility] OR;  Service: Podiatry;  Laterality: Left;    ULNAR OSTEOPLASTY  2018     Family History   Adopted: Yes   Problem Relation Age of Onset    No Known Problems Mother     No Known Problems Father      Social History     Socioeconomic History    Marital status:    Tobacco Use    Smoking status: Never    Smokeless tobacco: Never   Vaping Use    Vaping status: Never Used   Substance and Sexual Activity    Alcohol use: Not Currently    Drug use: Not Currently    Sexual activity: Yes     No Known Allergies    Current Outpatient Medications:     Alcohol Swabs pads, Use 1 Pad Daily., Disp: 100 each, Rfl: 1    aspirin-acetaminophen-caffeine (EXCEDRIN MIGRAINE) 250-250-65 MG per tablet, Take  by mouth., Disp: , Rfl:     atorvastatin (LIPITOR) 20 MG tablet, Take 1 tablet by mouth Every Night., Disp: 90 tablet, Rfl: 1    BD Pen Needle Amberly 2nd Gen 32G X 4 MM misc, Use 1 each 5 (Five) Times a Day. use as directed, Disp: 200 each, Rfl: 3    chlorthalidone (HYGROTON) 25 MG tablet, Take 1 tablet by mouth Daily., Disp: 30 tablet, Rfl: 1    Continuous Glucose Sensor (Dexcom G7 Sensor) misc, Use 1 each Every 10 (Ten) Days for 180 days., Disp: 3 each, Rfl: 5    dapagliflozin Propanediol (Farxiga) 10 MG tablet, Take 10 mg by mouth Daily., Disp: 90 tablet, Rfl: 3    glucose blood (OneTouch Verio) test strip, Use as instructed for verification and calibration of  "DEXCOM G7 up to three times daily., Disp: 100 each, Rfl: 11    glucose monitor monitoring kit, Use 1 each Daily., Disp: 1 each, Rfl: 0    insulin aspart (NovoLOG FlexPen) 100 UNIT/ML solution pen-injector sc pen, Inject 35 Units under the skin into the appropriate area as directed 3 (Three) Times a Day With Meals for 180 days., Disp: 30 mL, Rfl: 5    Insulin Glargine (Lantus SoloStar) 100 UNIT/ML injection pen, Inject 30 Units under the skin into the appropriate area as directed 2 (Two) Times a Day for 180 days., Disp: 18 mL, Rfl: 5    Insulin Syringe 31G X 5/16\" 1 ML misc, Use 1 syringe Daily., Disp: 100 each, Rfl: 1    lamoTRIgine (LaMICtal) 25 MG tablet, Take 2 tablets by mouth Daily., Disp: 60 tablet, Rfl: 2    Lancets (freestyle) lancets, Use 3 times per day as needed to check blood sugars, Disp: 100 each, Rfl: 4    levothyroxine (Synthroid) 25 MCG tablet, Take 1 tablet by mouth Every Morning., Disp: 30 tablet, Rfl: 1    lisinopril (PRINIVIL,ZESTRIL) 40 MG tablet, TAKE 1 TABLET BY MOUTH DAILY, Disp: 90 tablet, Rfl: 1    lurasidone (Latuda) 40 MG tablet tablet, Take 1 tablet by mouth Daily., Disp: 30 tablet, Rfl: 1    meclizine (ANTIVERT) 25 MG tablet, Take 1 tablet by mouth 3 (Three) Times a Day As Needed for Dizziness., Disp: 10 tablet, Rfl: 0    Norethin-Eth Estrad-Fe Biphas (Lo Loestrin Fe) 1 MG-10 MCG / 10 MCG tablet, Take 1 tablet by mouth Daily., Disp: 28 tablet, Rfl: 3    omeprazole (priLOSEC) 20 MG capsule, Take 1 capsule by mouth Daily., Disp: 90 capsule, Rfl: 1    ondansetron ODT (ZOFRAN-ODT) 4 MG disintegrating tablet, Place 1 tablet on the tongue Every 4 (Four) Hours As Needed for Nausea or Vomiting., Disp: 15 tablet, Rfl: 0    sertraline (ZOLOFT) 100 MG tablet, Take 1 tablet by mouth Daily., Disp: 30 tablet, Rfl: 1    topiramate (Topamax) 25 MG tablet, Take 1 tablet by mouth Daily., Disp: 30 tablet, Rfl: 1    Insulin Lispro (HumaLOG KwikPen) 200 UNIT/ML solution pen-injector, Inject 80 Units under " the skin into the appropriate area as directed 3 (Three) Times a Day Before Meals for 180 days., Disp: 110 mL, Rfl: 1    Objective     There were no vitals filed for this visit.  There is no height or weight on file to calculate BMI.    Physical Exam    Result Review :   The following data was reviewed by: JOSE Bliss on 10/03/2024:    Most Recent A1C          8/14/2024    10:41   HGBA1C Most Recent   Hemoglobin A1C 9.20        A1C Last 3 Results          5/14/2024    12:09 8/2/2024    09:40 8/14/2024    10:41   HGBA1C Last 3 Results   Hemoglobin A1C 9.20  9.40  9.20      A1c collected on 8/14/2024 is 9.2%, indicating Uncontrolled Type II diabetes.  This result is down from the prior result of 9.4% collected on 8/2/2024    Glucose   Date Value Ref Range Status   08/01/2024 296 (A) 70 - 130 mg/dL Final     Creatinine   Date Value Ref Range Status   09/25/2024 0.67 0.57 - 1.00 mg/dL Final   08/14/2024 0.70 0.57 - 1.00 mg/dL Final     eGFR   Date Value Ref Range Status   09/25/2024 127.7 >60.0 mL/min/1.73 Final   08/14/2024 126.4 >60.0 mL/min/1.73 Final     Labs collected on 9/25/2024 show Normal values    Microalbumin, Urine   Date Value Ref Range Status   02/06/2024 1.4 mg/dL Final   12/09/2022 2.4 mg/dL Final     Total Cholesterol   Date Value Ref Range Status   08/14/2024 235 (H) 0 - 200 mg/dL Final   05/14/2024 182 0 - 200 mg/dL Final   05/06/2019 128 <=199 mg/dL Final     Comment:     Age Range       Desirable       Borderline High       High Risk  Child/Adol.    <170 mg/dL      170-199 mg/dL         >=200 mg/dL  Adult          <200 mg/dL      200-239 mg/dL         >=240 mg/dL   07/18/2018 130 <=199 mg/dL Final     Comment:     Age Range       Desirable       Borderline High       High Risk  Child/Adol.    <170 mg/dL      170-199 mg/dL         >=200 mg/dL  Adult          <200 mg/dL      200-239 mg/dL         >=240 mg/dL     Triglycerides   Date Value Ref Range Status   08/14/2024 480 (H) 0 - 150 mg/dL  Final   05/14/2024 373 (H) 0 - 150 mg/dL Final   05/06/2019 124 <=129 mg/dL Final     Comment:     Age Range      Acceptable     Borderline High    High           Very High  Child(2-9Yrs)  <75 mg/dL      75-99 mg/dL       >=100 mg/dL  Adolescent  10-18 Yrs      <90 mg/dL       mg/dL      >=130 mg/dL  Adult  >18 Yrs        <150 mg/dL     150-199 mg/dL     200-499 mg/dL    >=500mg/dL   07/18/2018 84 <=129 mg/dL Final     Comment:     Age Range      Acceptable     Borderline High    High           Very High  Child(2-9Yrs)  <75 mg/dL      75-99 mg/dL       >=100 mg/dL  Adolescent  10-18 Yrs      <90 mg/dL       mg/dL      >=130 mg/dL  Adult  >18 Yrs        <150 mg/dL     150-199 mg/dL     200-499 mg/dL    >=500mg/dL     HDL Cholesterol   Date Value Ref Range Status   08/14/2024 37 (L) 40 - 60 mg/dL Final   05/14/2024 30 (L) 40 - 60 mg/dL Final     LDL Cholesterol    Date Value Ref Range Status   08/14/2024 114 (H) 0 - 100 mg/dL Final   05/14/2024 90 0 - 100 mg/dL Final     LDL Chol Calc (NIH)   Date Value Ref Range Status   05/06/2019 75 <=129 mg/dL Final     Comment:     Age Range         Acceptable      Borderline High       High  Child/Adol.      <110 mg/dL       110-129 mg/dL        >=130 mg/dL  Adult            <100 mg/dL       100-159 mg/dL        >=160mg/dL   07/18/2018 76 <=129 mg/dL Final     Comment:     Age Range         Acceptable      Borderline High       High  Child/Adol.      <110 mg/dL       110-129 mg/dL        >=130 mg/dL  Adult            <100 mg/dL       100-159 mg/dL        >=160mg/dL     Lipid panel collected on 8/14/2024 shows Hyperlipidemia, Hypercholesterolemia, and Hypertriglyceridemia            Assessment: Patient presents for 1 month follow-up via video visit due to illness.  Patient CGM/insulin pump report was reviewed and shows an average glucose of 252 mg/dL, with 27% in target range ( mgdL), 22% in the high range (181-250 mg/dL), 52% in the very high range (>250  mg/dL), 0% in the low range (54-70 mg/dL) and 0% in the very low range (<54 mg/dL).  Upon further review the patient is entering approximately 382 g of carbohydrates per day, and using 233 units/day which is requiring a reservoir and site change every 1.2 days.  Due to this extremely high insulin usage patient may be a candidate for concentrated insulin in her insulin pump, which changes must be completed at a face-to-face appointment for safety reasons.  Patient states that she is counting carbohydrates on foods with food labels but is struggling to calculate the amount of carbohydrates and fruit and other foods without food labels.  She states that she is running a low on insulin and only has 1 pen remaining.  She also complains of some itching at her infusion site below the skin, without a rash.  She may be having an allergic reaction to the NovoLog insulin at the higher doses that she is taking.      Diagnoses and all orders for this visit:    1. Type 2 diabetes mellitus with hyperglycemia, with long-term current use of insulin (Primary)  -     Insulin Lispro (HumaLOG KwikPen) 200 UNIT/ML solution pen-injector; Inject 80 Units under the skin into the appropriate area as directed 3 (Three) Times a Day Before Meals for 180 days.  Dispense: 110 mL; Refill: 1  -     insulin aspart (NovoLOG FlexPen) 100 UNIT/ML solution pen-injector sc pen; Inject 35 Units under the skin into the appropriate area as directed 3 (Three) Times a Day With Meals for 180 days.  Dispense: 30 mL; Refill: 5    2. Insulin pump in place    3. Uses self-applied continuous glucose monitoring device        Plan: Due to the insulin infusion site reaction a prescription for Humalog was sent to the patient's pharmacy, which will require a prior authorization.  Patient request that a prescription for NovoLog be sent to pharmacy so that she can fill her pump as she is using 1 reservoir per day, and she will be out of insulin before the prior  authorization can be approved.  The patient is scheduled for a follow-up in the clinic on Monday to adjust insulin pump settings.  If the patient receives her prescriptions for Humalog prior to her Monday appointment she will bring that insulin with her to the appointment.  Provided education on the utilization of the [employer] janes on her cellular phone to assist with counting carbohydrates.  Patient will continue to focus on her diet and physical activity as tolerated to assist with improvement in glycemic control and weight management.  Patient is scheduled for a follow-up in 4 days, a urine microalbumin and A1c will be collected, and CGM/insulin pump report reviewed.    The patient will monitor her blood glucose levels per CGM.  If she develops problematic hyperglycemia or hypoglycemia or adverse drug reactions, she will contact the office for further instructions.          Follow Up     No follow-ups on file.    Patient was given instructions and counseling regarding her condition or for health maintenance advice. Please see specific information pulled into the AVS if appropriate.     Walter Tate, APRN  10/03/2024      Dictated Utilizing Dragon Dictation.  Please note that portions of this note were completed with a voice recognition program.  Part of this note may be an electronic transcription/translation of spoken language to printed text using the Dragon Dictation System.

## 2024-10-06 NOTE — PROGRESS NOTES
Chief Complaint  Diabetes (Follow up, med mgt, PUMP/CGM eval,  A1c eval)    Referred By: JOSE Mcadams presents to [facility] for insulin pump management    History of Present Illness    Visit type:  follow-up  Diabetes type:  Type 2  Current diabetes status/concerns/issues: Patient recently initiated on Tandem t:slim X2 insulin pump-utilizing a large volume of insulin  Other health concerns:  Recent stomach virus  Current Diabetes symptoms:    Polyuria: Yes when blood sugars elevated   Polydipsia: Yes when blood sugars elevated   Polyphagia: No   Blurred vision: Yes when blood sugars elevated   Excessive fatigue: Yes always tired  Known Diabetes complications:  Neuropathy: Numbness; Location: Hands and Bilateral  Renal: No current urine microalbumin available and Normal eGFR per current labs  Eyes: No Retinopathy reported on current eye exam; Location: Bilateral  Amputation/Wounds: None  GI: Nausea  Cardiovascular: Hypertension, Hyperlipidemia, Hypercholesterolemia, and Hypertriglyceridemia  ED: N/A  Other: None  Hypoglycemia:  None reported at this time  Hypoglycemia Symptoms:  No hypoglycemia at this time  Current diabetes treatment:  NovoLog via tandem insulin pump, Farxiga 10 mg daily  Blood glucose device:  Dexcom CGM  Blood glucose monitoring frequency:  Continuous per CGM  Blood glucose range/average:  The 14-day sensor report shows an average glucose of 252mg/dL, with 27% in target range ( mgdL), 22% in the high range (181-250 mg/dL), 52% in the very high range (>250 mg/dL), 0% in the low range (54-70 mg/dL) and 0% in the very low range (<54 mg/dL).   Glucose Source: Device Reviewed  Diet:  Carbohydrate Counting, Number of meals each day - 2-3; Number of snacks each day - 1-2  Activity/Exercise:   Active with work    Past Medical History:   Diagnosis Date    Anxiety     COVID 08/07/2022    NO CURRENT SYMPTOMS    Diabetes  mellitus     Foot pain, left     Hypertension     Migraines      Past Surgical History:   Procedure Laterality Date    FOOT SURGERY      DISTAL LEFT FIRST METATARSAL    HARDWARE REMOVAL Left 8/26/2022    Procedure: HARDWARE REMOVAL;  left first metatarsal shaft;  Surgeon: Michel Jaffe DPM;  Location: [facility] MAIN OR;  Service: Podiatry;  Laterality: Left;    ULNAR OSTEOPLASTY  2018     Family History   Adopted: Yes   Problem Relation Age of Onset    No Known Problems Mother     No Known Problems Father      Social History     Socioeconomic History    Marital status:    Tobacco Use    Smoking status: Never    Smokeless tobacco: Never   Vaping Use    Vaping status: Never Used   Substance and Sexual Activity    Alcohol use: Not Currently    Drug use: Not Currently    Sexual activity: Yes     Allergies   Allergen Reactions    Novolog [Insulin Aspart] Hives       Current Outpatient Medications:     Alcohol Swabs pads, Use 1 Pad Daily., Disp: 100 each, Rfl: 1    aspirin-acetaminophen-caffeine (EXCEDRIN MIGRAINE) 250-250-65 MG per tablet, Take  by mouth., Disp: , Rfl:     atorvastatin (LIPITOR) 20 MG tablet, Take 1 tablet by mouth Every Night., Disp: 90 tablet, Rfl: 1    BD Pen Needle Amberly 2nd Gen 32G X 4 MM misc, Use 1 each 5 (Five) Times a Day. use as directed, Disp: 200 each, Rfl: 3    chlorthalidone (HYGROTON) 25 MG tablet, Take 1 tablet by mouth Daily., Disp: 30 tablet, Rfl: 1    Continuous Glucose Sensor (Dexcom G7 Sensor) misc, Use 1 each Every 10 (Ten) Days for 180 days., Disp: 3 each, Rfl: 5    dapagliflozin Propanediol (Farxiga) 10 MG tablet, Take 10 mg by mouth Daily., Disp: 90 tablet, Rfl: 3    glucose blood (OneTouch Verio) test strip, Use as instructed for verification and calibration of DEXCOM G7 up to three times daily., Disp: 100 each, Rfl: 11    glucose monitor monitoring kit, Use 1 each Daily., Disp: 1 each, Rfl: 0    insulin aspart (NovoLOG FlexPen) 100 UNIT/ML solution pen-injector sc  "pen, Inject 35 Units under the skin into the appropriate area as directed 3 (Three) Times a Day With Meals for 180 days., Disp: 30 mL, Rfl: 5    Insulin Syringe 31G X 5/16\" 1 ML misc, Use 1 syringe Daily., Disp: 100 each, Rfl: 1    lamoTRIgine (LaMICtal) 25 MG tablet, Take 2 tablets by mouth Daily., Disp: 60 tablet, Rfl: 2    Lancets (freestyle) lancets, Use 3 times per day as needed to check blood sugars, Disp: 100 each, Rfl: 4    levothyroxine (Synthroid) 25 MCG tablet, Take 1 tablet by mouth Every Morning., Disp: 30 tablet, Rfl: 1    lisinopril (PRINIVIL,ZESTRIL) 40 MG tablet, TAKE 1 TABLET BY MOUTH DAILY, Disp: 90 tablet, Rfl: 1    lurasidone (Latuda) 40 MG tablet tablet, Take 1 tablet by mouth Daily., Disp: 30 tablet, Rfl: 1    meclizine (ANTIVERT) 25 MG tablet, Take 1 tablet by mouth 3 (Three) Times a Day As Needed for Dizziness., Disp: 10 tablet, Rfl: 0    Norethin-Eth Estrad-Fe Biphas (Lo Loestrin Fe) 1 MG-10 MCG / 10 MCG tablet, Take 1 tablet by mouth Daily., Disp: 28 tablet, Rfl: 3    omeprazole (priLOSEC) 20 MG capsule, Take 1 capsule by mouth Daily., Disp: 90 capsule, Rfl: 1    ondansetron ODT (ZOFRAN-ODT) 4 MG disintegrating tablet, Place 1 tablet on the tongue Every 4 (Four) Hours As Needed for Nausea or Vomiting., Disp: 15 tablet, Rfl: 0    sertraline (ZOLOFT) 100 MG tablet, Take 1 tablet by mouth Daily., Disp: 30 tablet, Rfl: 1    topiramate (Topamax) 25 MG tablet, Take 1 tablet by mouth Daily., Disp: 30 tablet, Rfl: 1    Insulin Lispro-aabc (Lyumjev KwikPen) 200 UNIT/ML solution pen-injector, Inject 80 Units under the skin into the appropriate area as directed 3 (Three) Times a Day Before Meals for 180 days., Disp: 110 mL, Rfl: 1    pioglitazone (Actos) 15 MG tablet, Take 1 tablet by mouth Daily., Disp: 30 tablet, Rfl: 5    Objective     Vitals:    10/07/24 1319 10/07/24 1335   BP: (!) 168/119 (!) 148/102   BP Location:  Right arm   Patient Position:  Sitting   Cuff Size:  Adult   Pulse: 94    SpO2: " "96%    Weight: 87.5 kg (193 lb)    Height: 157.5 cm (62.01\")    PainSc:   5    PainLoc: Head      Body mass index is 35.29 kg/m².    Physical Exam  Constitutional:       Appearance: Normal appearance. She is obese.      Comments: Severe Obesity with Comorbidity ( BMI 35 - 39.9) Pt Current BMI = 35.29 with comorbidities of hypertension, hyperlipidemia, diabetes     HENT:      Head: Normocephalic and atraumatic.      Right Ear: External ear normal.      Left Ear: External ear normal.      Nose: Nose normal.   Eyes:      Extraocular Movements: Extraocular movements intact.      Conjunctiva/sclera: Conjunctivae normal.   Pulmonary:      Effort: Pulmonary effort is normal.   Musculoskeletal:         General: Normal range of motion.      Cervical back: Normal range of motion.   Skin:     General: Skin is warm and dry.   Neurological:      General: No focal deficit present.      Mental Status: She is alert and oriented to person, place, and time. Mental status is at baseline.   Psychiatric:         Mood and Affect: Mood normal.         Behavior: Behavior normal.         Thought Content: Thought content normal.         Judgment: Judgment normal.         Result Review :   The following data was reviewed by: JOSE Bliss on 10/03/2024:    Most Recent A1C          10/7/2024    13:32   HGBA1C Most Recent   Hemoglobin A1C 9.6        A1C Last 3 Results          8/2/2024    09:40 8/14/2024    10:41 10/7/2024    13:32   HGBA1C Last 3 Results   Hemoglobin A1C 9.40  9.20  9.6      A1c collected in the office today is 9.6%, indicating Uncontrolled Type II diabetes.  This result is up from the prior result of 9.2% collected on 08/02/2024    Glucose   Date Value Ref Range Status   10/07/2024 227 (H) 70 - 99 mg/dL Final     Comment:     Serial Number: [id number]:  [id number]     Creatinine   Date Value Ref Range Status   09/25/2024 0.67 0.57 - 1.00 mg/dL Final   08/14/2024 0.70 0.57 - 1.00 mg/dL Final     eGFR   Date Value " Ref Range Status   09/25/2024 127.7 >60.0 mL/min/1.73 Final   08/14/2024 126.4 >60.0 mL/min/1.73 Final     Labs collected on 9/25/2024 show Normal values    Microalbumin, Urine   Date Value Ref Range Status   02/06/2024 1.4 mg/dL Final   12/09/2022 2.4 mg/dL Final     Total Cholesterol   Date Value Ref Range Status   08/14/2024 235 (H) 0 - 200 mg/dL Final   05/14/2024 182 0 - 200 mg/dL Final   05/06/2019 128 <=199 mg/dL Final     Comment:     Age Range       Desirable       Borderline High       High Risk  Child/Adol.    <170 mg/dL      170-199 mg/dL         >=200 mg/dL  Adult          <200 mg/dL      200-239 mg/dL         >=240 mg/dL   07/18/2018 130 <=199 mg/dL Final     Comment:     Age Range       Desirable       Borderline High       High Risk  Child/Adol.    <170 mg/dL      170-199 mg/dL         >=200 mg/dL  Adult          <200 mg/dL      200-239 mg/dL         >=240 mg/dL     Triglycerides   Date Value Ref Range Status   08/14/2024 480 (H) 0 - 150 mg/dL Final   05/14/2024 373 (H) 0 - 150 mg/dL Final   05/06/2019 124 <=129 mg/dL Final     Comment:     Age Range      Acceptable     Borderline High    High           Very High  Child(2-9Yrs)  <75 mg/dL      75-99 mg/dL       >=100 mg/dL  Adolescent  10-18 Yrs      <90 mg/dL       mg/dL      >=130 mg/dL  Adult  >18 Yrs        <150 mg/dL     150-199 mg/dL     200-499 mg/dL    >=500mg/dL   07/18/2018 84 <=129 mg/dL Final     Comment:     Age Range      Acceptable     Borderline High    High           Very High  Child(2-9Yrs)  <75 mg/dL      75-99 mg/dL       >=100 mg/dL  Adolescent  10-18 Yrs      <90 mg/dL       mg/dL      >=130 mg/dL  Adult  >18 Yrs        <150 mg/dL     150-199 mg/dL     200-499 mg/dL    >=500mg/dL     HDL Cholesterol   Date Value Ref Range Status   08/14/2024 37 (L) 40 - 60 mg/dL Final   05/14/2024 30 (L) 40 - 60 mg/dL Final     LDL Cholesterol    Date Value Ref Range Status   08/14/2024 114 (H) 0 - 100 mg/dL Final   05/14/2024 90 0 -  100 mg/dL Final     LDL Chol Calc (NIH)   Date Value Ref Range Status   05/06/2019 75 <=129 mg/dL Final     Comment:     Age Range         Acceptable      Borderline High       High  Child/Adol.      <110 mg/dL       110-129 mg/dL        >=130 mg/dL  Adult            <100 mg/dL       100-159 mg/dL        >=160mg/dL   07/18/2018 76 <=129 mg/dL Final     Comment:     Age Range         Acceptable      Borderline High       High  Child/Adol.      <110 mg/dL       110-129 mg/dL        >=130 mg/dL  Adult            <100 mg/dL       100-159 mg/dL        >=160mg/dL     Lipid panel collected on 8/14/2024 shows Hyperlipidemia, Hypercholesterolemia, Hypertriglyceridemia, and low HDL           Assessment: Patient presents for a 1 month follow-up.  Patient was started on her tandem t:slim X2 insulin pump approximately 2 weeks ago.  Patient is utilizing a large volume of insulin with complaints of redness and irritation at her infusion sites.  Patient states when she was previously utilizing.  Humalog/Lyumjev U200 in large doses she did not have injection/infusion site reactions, therefore, NovoLog (insulin aspart) is now listed as an allergy.  Patient has had an inadequate response to Admelog, NovoLog, and Fiasp in large volumes.  Patient's CGM report was reviewed and shows an average glucose of 252 mg/dL, with 27% in target range ( mgdL), 22% in the high range (181-250 mg/dL), 52% in the very high range (>250 mg/dL), 0% in the low range (54-70 mg/dL) and 0% in the very low range (<54 mg/dL), despite using large doses of insulin with an average daily dose of 224 units.  Will consider the addition of Actos (pioglitazone) and returning to U200 insulin to assist with improved glycemic control.  Patient is hypertensive in the office today with a blood pressure of 148/102.  Patient denies signs and symptoms of endorgan damage, and states that she has taken her medication today as prescribed.  Patient with a current BMI of 35.29  kg/m².    Diagnoses and all orders for this visit:    1. Type 2 diabetes mellitus with hyperglycemia, with long-term current use of insulin (Primary)  -     POC Glycosylated Hemoglobin (Hb A1C)  -     pioglitazone (Actos) 15 MG tablet; Take 1 tablet by mouth Daily.  Dispense: 30 tablet; Refill: 5  -     Insulin Lispro-aabc (Lyumjev KwikPen) 200 UNIT/ML solution pen-injector; Inject 80 Units under the skin into the appropriate area as directed 3 (Three) Times a Day Before Meals for 180 days.  Dispense: 110 mL; Refill: 1    2. Type 2 diabetes mellitus with diabetic polyneuropathy, with long-term current use of insulin    3. Resistant hypertension    4. Class 2 severe obesity due to excess calories with serious comorbidity and body mass index (BMI) of 35.0 to 35.9 in adult    5. Uses self-applied continuous glucose monitoring device    Other orders  -     POC Glucose        Plan: Prescription for Actos 15 mg once daily was sent to patient's pharmacy to help with insulin resistance.  Patient was provided sample insulin pump supplies as we continue to work on getting her insulin daily dose down.  Her insurance initially denied the Humalog/Lyumjev U200 insulin as it is not on formulary, but an appeal will be filed with today's chart note.  No changes were made to the patient's insulin pump settings at today's visit.  The settings for U200 insulin were programmed into the pump as a second profile and patient understands how to change the profile over once U200 insulin is received and put in the pump.  Once the patient receives her U200 insulin she will inform the provider and we will schedule a follow-up within 1 month to start making changes to her settings.  Patient had a Dexcom G7 sensor failed and her sample replacement was provided in office today.  Patient was encouraged to contact Dexcom for a replacement sensor.  Encouraged the patient to continue to count her carbohydrates and admitted those into her pump.   Patient is encouraged to incorporate more protein into her daily diet to assist with improvement in glycemic control and weight management.  Patient is scheduled for a follow-up in 6 weeks, an A1c will be collected, and CGM report reviewed.    The patient will monitor her blood glucose levels per CGM.  If she develops problematic hyperglycemia or hypoglycemia or adverse drug reactions, she will contact the office for further instructions.        Follow Up     Return in about 6 weeks (around 11/18/2024) for CGM Follow-Up, Insulin Pump Follow-Up, Medication Management.    Patient was given instructions and counseling regarding her condition or for health maintenance advice. Please see specific information pulled into the AVS if appropriate.     Walter Tate, APRN  10/07/2024      Dictated Utilizing Dragon Dictation.  Please note that portions of this note were completed with a voice recognition program.  Part of this note may be an electronic transcription/translation of spoken language to printed text using the Dragon Dictation System.

## 2024-10-07 ENCOUNTER — OFFICE VISIT (OUTPATIENT)
Dept: DIABETES SERVICES | Facility: HOSPITAL | Age: 19
End: 2024-10-07
Payer: COMMERCIAL

## 2024-10-07 VITALS
HEIGHT: 62 IN | WEIGHT: 193 LBS | DIASTOLIC BLOOD PRESSURE: 102 MMHG | SYSTOLIC BLOOD PRESSURE: 148 MMHG | HEART RATE: 94 BPM | OXYGEN SATURATION: 96 % | BODY MASS INDEX: 35.51 KG/M2

## 2024-10-07 DIAGNOSIS — E11.42 TYPE 2 DIABETES MELLITUS WITH DIABETIC POLYNEUROPATHY, WITH LONG-TERM CURRENT USE OF INSULIN: ICD-10-CM

## 2024-10-07 DIAGNOSIS — E66.812 CLASS 2 SEVERE OBESITY DUE TO EXCESS CALORIES WITH SERIOUS COMORBIDITY AND BODY MASS INDEX (BMI) OF 35.0 TO 35.9 IN ADULT: ICD-10-CM

## 2024-10-07 DIAGNOSIS — Z79.4 TYPE 2 DIABETES MELLITUS WITH HYPERGLYCEMIA, WITH LONG-TERM CURRENT USE OF INSULIN: Primary | ICD-10-CM

## 2024-10-07 DIAGNOSIS — E66.01 CLASS 2 SEVERE OBESITY DUE TO EXCESS CALORIES WITH SERIOUS COMORBIDITY AND BODY MASS INDEX (BMI) OF 35.0 TO 35.9 IN ADULT: ICD-10-CM

## 2024-10-07 DIAGNOSIS — I1A.0 RESISTANT HYPERTENSION: ICD-10-CM

## 2024-10-07 DIAGNOSIS — Z97.8 USES SELF-APPLIED CONTINUOUS GLUCOSE MONITORING DEVICE: ICD-10-CM

## 2024-10-07 DIAGNOSIS — E11.65 TYPE 2 DIABETES MELLITUS WITH HYPERGLYCEMIA, WITH LONG-TERM CURRENT USE OF INSULIN: Primary | ICD-10-CM

## 2024-10-07 DIAGNOSIS — Z79.4 TYPE 2 DIABETES MELLITUS WITH DIABETIC POLYNEUROPATHY, WITH LONG-TERM CURRENT USE OF INSULIN: ICD-10-CM

## 2024-10-07 LAB
EXPIRATION DATE: ABNORMAL
GLUCOSE BLDC GLUCOMTR-MCNC: 227 MG/DL (ref 70–99)
HBA1C MFR BLD: 9.6 % (ref 4.5–5.7)
Lab: ABNORMAL

## 2024-10-07 PROCEDURE — 82948 REAGENT STRIP/BLOOD GLUCOSE: CPT

## 2024-10-07 PROCEDURE — 99214 OFFICE O/P EST MOD 30 MIN: CPT

## 2024-10-07 PROCEDURE — 83036 HEMOGLOBIN GLYCOSYLATED A1C: CPT

## 2024-10-07 PROCEDURE — 95251 CONT GLUC MNTR ANALYSIS I&R: CPT

## 2024-10-07 RX ORDER — ONDANSETRON 4 MG/1
TABLET, ORALLY DISINTEGRATING ORAL
Qty: 15 TABLET | Refills: 0 | OUTPATIENT
Start: 2024-10-07

## 2024-10-07 RX ORDER — PIOGLITAZONEHYDROCHLORIDE 15 MG/1
15 TABLET ORAL DAILY
Qty: 30 TABLET | Refills: 5 | Status: SHIPPED | OUTPATIENT
Start: 2024-10-07

## 2024-10-07 RX ORDER — INSULIN LISPRO-AABC 200 [IU]/ML
80 INJECTION, SOLUTION SUBCUTANEOUS
Qty: 110 ML | Refills: 1 | Status: SHIPPED | OUTPATIENT
Start: 2024-10-07 | End: 2025-04-05

## 2024-10-09 ENCOUNTER — TELEPHONE (OUTPATIENT)
Dept: INTERNAL MEDICINE | Facility: CLINIC | Age: 19
End: 2024-10-09
Payer: COMMERCIAL

## 2024-10-09 NOTE — TELEPHONE ENCOUNTER
Caller: [facility] [place] REFERRAL COORDINATOR     Relationship to patient: Other    Best call back number: [phone]  HOURS [facility]-F[facility] [id number]    Chief complaint: MEDICATION REVIEW    Type of visit: OFFICE    Requested date: ASAP     If rescheduling, when is the original appointment: 10.25.2024     Additional notes:PER CALLER PATIENT IS STATING THAT MEDICATION IS NOT MAKING HER FEEL RIGHT AND THE PATIENTS THERAPIST SAYS SHE SHOULD NOT FEEL THE WAY SHE DOES ON THE MEDICATION.    CALLER DID NOT KNOW THE NAME OF THE MEDICATION AT THIS TIME.    PLEASE CONTACT CALLER IF PROVIDER CAN GET PATIENT IN SOONER.    HUB UNABLE TO SCHEDULE IN TIMEFRAME REQUESTED.        Is it okay if the provider responds through MyChart: NO, CALL PREFERRED MAY LEAVE VOICEMAIL.

## 2024-10-11 NOTE — TELEPHONE ENCOUNTER
Ruthie has openings on 10-21.  Please offer this appointment.  Could also see Betsy next week if needed.

## 2024-10-14 NOTE — TELEPHONE ENCOUNTER
Ok for HUB to Relay    Tried to call number back, unable to reach    Lvm to call back and schedule sooner appt for 10/21 with Ruthie or sooner with another provider if okay with patient

## 2024-10-15 ENCOUNTER — HOSPITAL ENCOUNTER (OUTPATIENT)
Dept: MRI IMAGING | Facility: HOSPITAL | Age: 19
Discharge: HOME | End: 2024-10-15
Admitting: NURSE PRACTITIONER
Payer: COMMERCIAL

## 2024-10-15 DIAGNOSIS — G43.909 MIGRAINE WITHOUT STATUS MIGRAINOSUS, NOT INTRACTABLE, UNSPECIFIED MIGRAINE TYPE: ICD-10-CM

## 2024-10-15 DIAGNOSIS — R55 SYNCOPE, UNSPECIFIED SYNCOPE TYPE: ICD-10-CM

## 2024-10-15 DIAGNOSIS — R41.3 MEMORY CHANGES: ICD-10-CM

## 2024-10-15 DIAGNOSIS — H53.9 VISION CHANGES: ICD-10-CM

## 2024-10-15 PROCEDURE — A9577 INJ MULTIHANCE: HCPCS | Performed by: NURSE PRACTITIONER

## 2024-10-15 PROCEDURE — 0 GADOBENATE DIMEGLUMINE 529 MG/ML SOLUTION: Performed by: NURSE PRACTITIONER

## 2024-10-15 PROCEDURE — 70553 MRI BRAIN STEM W/O & W/DYE: CPT

## 2024-10-15 RX ADMIN — GADOBENATE DIMEGLUMINE 18 ML: 529 INJECTION, SOLUTION INTRAVENOUS at 17:28

## 2024-10-15 NOTE — TELEPHONE ENCOUNTER
Spoke with Michael Zepeda at [facility] outpatient [facility].  She reports that patient has been expressing an increase in suicidal ideation without a plan or intent to self-harm.  They have been having discussions about the medications may not be as effective at targeting the symptoms.  We will phone the patient to discuss some medication adjustments.

## 2024-10-15 NOTE — TELEPHONE ENCOUNTER
Attempted to phone patient.  Would like to discuss some possible medication adjustments.  Considering increasing Lamictal to 100 mg daily  Also considering addition of lithium 150 mg at bedtime, and if tolerated after 7 days increasing to 300 mg at bedtime.  We need to have a conversation with patient about risk and benefits, side effects.  Left voicemail for patient to return phone call to office.

## 2024-10-15 NOTE — TELEPHONE ENCOUNTER
RAFAELA HICKS WITH [facility] PHONED IN.    SHE IS CONCERNED ABOUT PTS MEDICATIONS AND WAS WONDERING IF THERE WAS ANYWAY PT COULD BE SEEN SOONER THAN HER CURRENT SCHEDULED FOLLOW UP ON 11/18/2024.    RAFAELA STATES THAT PTS LEVEL OF DEPRESSION AND THOUGHTS THAT PT HAS BEEN HAVING SHE DOESN'T FELL LIKE THE MEDICATIONS ARE HELPING THE PT.     IF PROVIDER NEEDS TO CONTACT RAFAELA HER BEST CALL BACK NUMBER [phone].    CLINICAL STAFF TO ALSO FOLLOW UP WITH RAFAELA IN REGARDS TO IF WE CAN GET PT IN SOONER OR HOW PROVIDER WOULD LIKE TO PROCEED.

## 2024-10-16 DIAGNOSIS — G43.909 MIGRAINE WITHOUT STATUS MIGRAINOSUS, NOT INTRACTABLE, UNSPECIFIED MIGRAINE TYPE: Primary | ICD-10-CM

## 2024-10-16 DIAGNOSIS — R55 SYNCOPE, UNSPECIFIED SYNCOPE TYPE: ICD-10-CM

## 2024-10-16 DIAGNOSIS — E34.8 PINEAL GLAND CYST: ICD-10-CM

## 2024-10-16 DIAGNOSIS — H53.9 VISION CHANGES: ICD-10-CM

## 2024-10-16 DIAGNOSIS — R41.3 MEMORY CHANGES: ICD-10-CM

## 2024-10-17 NOTE — TELEPHONE ENCOUNTER
PT(PATIENT) VERIFIED     PT(PATIENT) RETURNED PROVIDER CALL     CONTACTED PT(PATIENT) PER PROVIDER'S INSTRUCTIONS     PT(PATIENT) ADVISED PROVIDER WILL NEED TO DISCUSS DIRECTLY WITH HER THE MEDICATION BENEFITS/RISKS     PT(PATIENT) VERBALIZED UNDERSTANDING AND HAD NO FURTHER QUESTIONS AT THIS TIME     PT(PATIENT) ADVISED PROVIDER WILL CONTACT

## 2024-10-22 ENCOUNTER — TELEPHONE (OUTPATIENT)
Dept: PSYCHIATRY | Facility: CLINIC | Age: 19
End: 2024-10-22
Payer: COMMERCIAL

## 2024-10-22 RX ORDER — LAMOTRIGINE 100 MG/1
100 TABLET ORAL DAILY
Qty: 30 TABLET | Refills: 1 | Status: SHIPPED | OUTPATIENT
Start: 2024-10-22

## 2024-10-22 NOTE — TELEPHONE ENCOUNTER
Phoned patient. Would like to discuss some possible medication adjustments.   Considering increasing Lamictal to 100 mg daily  Also considering addition of lithium 150 mg at bedtime, and if tolerated after 7 days increasing to 300 mg at bedtime. 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 Medication sent to patient's preferred pharmacy in record.

## 2024-10-22 NOTE — TELEPHONE ENCOUNTER
RAFAELA @ [facility] CALLED STATES PROVIDER HAS TRIED TO CALL PT TO DISCUSS MEDS RAFAELA WOULD LIKE TO SPEAK WITH PROVIDER PLEASE CALL HER [phone]

## 2024-10-22 NOTE — TELEPHONE ENCOUNTER
Returned phone call to Dahlia at [facility], we are working to coordinate a phone call with patient to discuss medications.

## 2024-10-25 ENCOUNTER — OFFICE VISIT (OUTPATIENT)
Dept: INTERNAL MEDICINE | Facility: CLINIC | Age: 19
End: 2024-10-25
Payer: COMMERCIAL

## 2024-10-25 VITALS
SYSTOLIC BLOOD PRESSURE: 132 MMHG | DIASTOLIC BLOOD PRESSURE: 92 MMHG | OXYGEN SATURATION: 98 % | HEART RATE: 99 BPM | WEIGHT: 198 LBS | HEIGHT: 62 IN | BODY MASS INDEX: 36.44 KG/M2 | TEMPERATURE: 97.4 F

## 2024-10-25 DIAGNOSIS — E34.8 PINEAL GLAND CYST: ICD-10-CM

## 2024-10-25 DIAGNOSIS — E87.1 HYPONATREMIA: ICD-10-CM

## 2024-10-25 DIAGNOSIS — G43.909 MIGRAINE WITHOUT STATUS MIGRAINOSUS, NOT INTRACTABLE, UNSPECIFIED MIGRAINE TYPE: Primary | ICD-10-CM

## 2024-10-25 DIAGNOSIS — I10 PRIMARY HYPERTENSION: ICD-10-CM

## 2024-10-25 DIAGNOSIS — E03.9 HYPOTHYROIDISM, UNSPECIFIED TYPE: ICD-10-CM

## 2024-10-25 DIAGNOSIS — K21.9 GASTROESOPHAGEAL REFLUX DISEASE, UNSPECIFIED WHETHER ESOPHAGITIS PRESENT: ICD-10-CM

## 2024-10-25 LAB
ALBUMIN SERPL-MCNC: 4.3 G/DL (ref 3.5–5.2)
ALBUMIN/GLOB SERPL: 1.3 G/DL
ALP SERPL-CCNC: 115 U/L (ref 39–117)
ALT SERPL W P-5'-P-CCNC: 52 U/L (ref 1–33)
ANION GAP SERPL CALCULATED.3IONS-SCNC: 19.3 MMOL/L (ref 5–15)
AST SERPL-CCNC: 109 U/L (ref 1–32)
BILIRUB SERPL-MCNC: <0.2 MG/DL (ref 0–1.2)
BUN SERPL-MCNC: 19 MG/DL (ref 6–20)
BUN/CREAT SERPL: 22.6 (ref 7–25)
CALCIUM SPEC-SCNC: 10.5 MG/DL (ref 8.6–10.5)
CHLORIDE SERPL-SCNC: 96 MMOL/L (ref 98–107)
CO2 SERPL-SCNC: 20.7 MMOL/L (ref 22–29)
CREAT SERPL-MCNC: 0.84 MG/DL (ref 0.57–1)
EGFRCR SERPLBLD CKD-EPI 2021: 101.5 ML/MIN/1.73
GLOBULIN UR ELPH-MCNC: 3.4 GM/DL
GLUCOSE SERPL-MCNC: 164 MG/DL (ref 65–99)
POTASSIUM SERPL-SCNC: 3.6 MMOL/L (ref 3.5–5.2)
PROT SERPL-MCNC: 7.7 G/DL (ref 6–8.5)
SODIUM SERPL-SCNC: 136 MMOL/L (ref 136–145)
T4 FREE SERPL-MCNC: 0.9 NG/DL (ref 0.92–1.68)
TSH SERPL DL<=0.05 MIU/L-ACNC: 4.34 UIU/ML (ref 0.27–4.2)

## 2024-10-25 PROCEDURE — 99214 OFFICE O/P EST MOD 30 MIN: CPT | Performed by: NURSE PRACTITIONER

## 2024-10-25 PROCEDURE — 84439 ASSAY OF FREE THYROXINE: CPT | Performed by: NURSE PRACTITIONER

## 2024-10-25 PROCEDURE — 84443 ASSAY THYROID STIM HORMONE: CPT | Performed by: NURSE PRACTITIONER

## 2024-10-25 PROCEDURE — 80053 COMPREHEN METABOLIC PANEL: CPT | Performed by: NURSE PRACTITIONER

## 2024-10-25 RX ORDER — ONDANSETRON 4 MG/1
4 TABLET, ORALLY DISINTEGRATING ORAL EVERY 4 HOURS PRN
Qty: 15 TABLET | Refills: 0 | Status: SHIPPED | OUTPATIENT
Start: 2024-10-25

## 2024-10-25 RX ORDER — TOPIRAMATE 50 MG/1
50 TABLET, FILM COATED ORAL DAILY
Qty: 30 TABLET | Refills: 1 | Status: SHIPPED | OUTPATIENT
Start: 2024-10-25

## 2024-10-25 NOTE — PROGRESS NOTES
"Chief Complaint  Migraine (F/u) and Hypertension (F/u)    Subjective      Amarilis Vinson is a 19 y.o. female who presents to [facility]     Migraine-  Patient in clinic for one month follow up on Topamax. Has not noticed much of a difference in her headaches, would like to try to increase her dosage before seeing neurology in two weeks. 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  Patient states psychiatry thinks this may be contributing to her bipolar symptoms.    HTN-  Patient adjusted from HCTZ to chlorthalidone at previous visit. She is not checking her blood pressure at home.    GERD-  Patient requesting refill of PPI and Zofran.    Objective   Vital Signs:   Vitals:    10/25/24 1345 10/25/24 1417   BP: 138/98 132/92   BP Location: Left arm    Patient Position: Sitting    Cuff Size: Adult    Pulse: 99    Temp: 97.4 °F (36.3 °C)    TempSrc: Temporal    SpO2: 98%    Weight: 89.8 kg (198 lb)    Height: 157.5 cm (62.01\")      Body mass index is 36.21 kg/m².    Wt Readings from Last 3 Encounters:   10/25/24 89.8 kg (198 lb)   10/07/24 87.5 kg (193 lb)   09/25/24 83.5 kg (184 lb)     BP Readings from Last 3 Encounters:   10/25/24 132/92   10/07/24 (!) 148/102   09/25/24 162/98       Health Maintenance   Topic Date Due    Hepatitis B (4 of 4 - 4-dose series) 2005    PAP SMEAR  Never done    ANNUAL PHYSICAL  10/12/2023    INFLUENZA VACCINE  08/01/2024    COVID-19 Vaccine (1 - 2023-24 season) Never done    CHLAMYDIA SCREENING  01/05/2025    URINE MICROALBUMIN  02/06/2025    HEMOGLOBIN A1C  04/07/2025    BMI FOLLOWUP  07/22/2025    DIABETIC FOOT EXAM  08/14/2025    LIPID PANEL  08/14/2025    DIABETIC EYE EXAM  08/16/2025    TDAP/TD VACCINES (3 - Td or Tdap) 02/27/2034    HEPATITIS C SCREENING  Completed    Pneumococcal Vaccine 0-64  Completed    MENINGOCOCCAL VACCINE  Completed    HPV VACCINES  Completed       Physical Exam  Constitutional:       Appearance: Normal appearance.   HENT: "      Head: Normocephalic and atraumatic.      Nose: Nose normal.      Mouth/Throat:      Mouth: Mucous membranes are moist.      Pharynx: Oropharynx is clear.   Eyes:      Extraocular Movements: Extraocular movements intact.      Conjunctiva/sclera: Conjunctivae normal.      Pupils: Pupils are equal, round, and reactive to light.   Cardiovascular:      Rate and Rhythm: Normal rate and regular rhythm.      Heart sounds: Normal heart sounds.   Pulmonary:      Effort: Pulmonary effort is normal.      Breath sounds: Normal breath sounds.   Skin:     General: Skin is warm and dry.   Neurological:      General: No focal deficit present.      Mental Status: She is alert and oriented to person, place, and time.   Psychiatric:         Mood and Affect: Mood normal.         Behavior: Behavior normal.         Thought Content: Thought content normal.          Result Review :  The following data was reviewed by: JOSE Mcadams on 10/25/2024:         Procedures          Assessment & Plan  Migraine without status migrainosus, not intractable, unspecified migraine type  Will increase Topamax to 50 mg, she should establish with neurology as scheduled for further evaluation.  Pineal gland cyst  Establish with neurology as discussed.  Primary hypertension  Significant improvement, continue chlorthalidone.  CMP today to evaluate for electrolyte imbalance.  Will continue to monitor closely and consider additional medications in the future if needed.  Hypothyroidism, unspecified type  Repeat thyroid labs today.  Hyponatremia  Repeat CMP today.  Gastroesophageal reflux disease, unspecified whether esophagitis present  PPI refilled.     New Medications Ordered This Visit   Medications    topiramate (Topamax) 50 MG tablet     Sig: Take 1 tablet by mouth Daily.     Dispense:  30 tablet     Refill:  1    ondansetron ODT (ZOFRAN-ODT) 4 MG disintegrating tablet     Sig: Place 1 tablet on the tongue Every 4 (Four) Hours As Needed for Nausea  "or Vomiting.     Dispense:  15 tablet     Refill:  0    omeprazole (priLOSEC) 20 MG capsule     Sig: Take 1 capsule by mouth Daily.     Dispense:  90 capsule     Refill:  1                    FOLLOW UP  Return in about 6 weeks (around 12/6/2024).  Patient was given instructions and counseling regarding her condition or for health maintenance advice. Please see specific information pulled into the AVS if appropriate.       Ruthie Segal, JOSE  10/25/24  15:05 EDT    CURRENT & DISCONTINUED MEDICATIONS  Current Outpatient Medications   Medication Instructions    Alcohol Swabs pads 1 Pad, Does not apply, Daily    aspirin-acetaminophen-caffeine (EXCEDRIN MIGRAINE) 250-250-65 MG per tablet Take  by mouth.    atorvastatin (LIPITOR) 20 mg, Oral, Nightly    BD Pen Needle Amberly 2nd Gen 32G X 4 MM misc 1 each, Does not apply, 5 Times Daily, use as directed    chlorthalidone (HYGROTON) 25 mg, Oral, Daily    Continuous Glucose Sensor (Dexcom G7 Sensor) misc 1 each, Does not apply, Every 10 Days    dapagliflozin Propanediol (FARXIGA) 10 mg, Oral, Daily    glucose blood (OneTouch Verio) test strip Use as instructed for verification and calibration of DEXCOM G7 up to three times daily.    glucose monitor monitoring kit 1 each, Does not apply, Daily    Insulin Syringe 31G X 5/16\" 1 ML misc 1 syringe, Does not apply, Daily    lamoTRIgine (LAMICTAL) 100 mg, Oral, Daily    Lancets (freestyle) lancets Use 3 times per day as needed to check blood sugars    levothyroxine (SYNTHROID) 25 mcg, Oral, Every Early Morning    lisinopril (PRINIVIL,ZESTRIL) 40 mg, Oral, Daily    lurasidone (LATUDA) 40 mg, Oral, Daily    Lyumjev KwikPen 80 Units, Subcutaneous, 3 Times Daily Before Meals    meclizine (ANTIVERT) 25 mg, Oral, 3 Times Daily PRN    Norethin-Eth Estrad-Fe Biphas (Lo Loestrin Fe) 1 MG-10 MCG / 10 MCG tablet 1 tablet, Oral, Daily    NovoLOG FlexPen 35 Units, Subcutaneous, 3 Times Daily With Meals    omeprazole (PRILOSEC) 20 mg, Oral, Daily "    ondansetron ODT (ZOFRAN-ODT) 4 mg, Translingual, Every 4 Hours PRN    pioglitazone (ACTOS) 15 mg, Oral, Daily    sertraline (ZOLOFT) 100 mg, Oral, Daily    topiramate (TOPAMAX) 50 mg, Oral, Daily       Medications Discontinued During This Encounter   Medication Reason    topiramate (Topamax) 25 MG tablet     ondansetron ODT (ZOFRAN-ODT) 4 MG disintegrating tablet Reorder    omeprazole (priLOSEC) 20 MG capsule Reorder

## 2024-10-25 NOTE — ASSESSMENT & PLAN NOTE
Significant improvement, continue chlorthalidone.  CMP today to evaluate for electrolyte imbalance.  Will continue to monitor closely and consider additional medications in the future if needed.

## 2024-10-25 NOTE — ASSESSMENT & PLAN NOTE
Will increase Topamax to 50 mg, she should establish with neurology as scheduled for further evaluation.

## 2024-10-28 RX ORDER — TOPIRAMATE 50 MG/1
50 TABLET, FILM COATED ORAL DAILY
Qty: 90 TABLET | OUTPATIENT
Start: 2024-10-28

## 2024-10-29 ENCOUNTER — TELEPHONE (OUTPATIENT)
Dept: INTERNAL MEDICINE | Facility: CLINIC | Age: 19
End: 2024-10-29
Payer: COMMERCIAL

## 2024-10-29 NOTE — TELEPHONE ENCOUNTER
Caller: Amarilis Vinson    Relationship: self    Best call back number: [phone]     Who are you requesting to speak with (clinical staff, provider,  specific staff member): Provider    What was the call regarding: Pt is requesting a letter be written and faxed to [facility] regarding migraines to avoid jamee discharged. Pt stated this is urgent and requested letter be faxed today/ tomorrow    Pt is requested letter be faxed to [phone]

## 2024-10-30 NOTE — TELEPHONE ENCOUNTER
Please call and get more details from patient, I am not sure what exactly she is needing and what for.

## 2024-11-01 NOTE — TELEPHONE ENCOUNTER
Letter drafted and signed by provider, letter faxed to [facility] with success confirmation received. Pt has been notified.

## 2024-11-01 NOTE — TELEPHONE ENCOUNTER
Okay to provide note as follows:    To Whom it May Concern,    Patient Amarilis Vinson ( 2005) is currently undergoing evaluation for persistent headaches. She has an upcoming appointment with a neurologist to evaluate this further.     JOSE Mcadams

## 2024-11-01 NOTE — PROGRESS NOTES
"Date of visit: 11/1/2024   Patient ID: Amarilis Vinson  Age: 19 y.o.     Chief compliant: No chief complaint on file.      HPI:      Amarilis Vinson is a 19 y.o. ***-handed female with *** who presents for evaluation of ***                      Pertinent FHx/Social: ***    Review of Systems     The following portions of the patient's history were reviewed and updated as appropriate: allergies, current medications, past family history, past medical history, past social history, past surgical history and problem list.    There were no vitals filed for this visit.    Neurological Exam    Physical Exam    Imaging: {rad:[id number]::\"No new studies\"}  Labs reviewed including ***    Assessment/Plan:    Amarilis Vinson is a 19 y.o. female          There are no diagnoses linked to this encounter.       Additional recommendations/considerations:  ***    Chen Ayala MA    I spent *** minutes caring for this patient on this date of service. This time includes time spent by me in the following activities as necessary: preparing for the visit, reviewing tests, medical records and previous visits, obtaining and/or reviewing a separately obtained history, performing a medically appropriate exam and/or evaluation, counseling and educating the patient, and/or communicating with other healthcare professionals, documenting information in the medical record, independently interpreting results and communicating that information with the patient, and developing a medically appropriate treatment plan with consideration of other conditions, medications, and treatments.  "

## 2024-11-05 ENCOUNTER — OFFICE VISIT (OUTPATIENT)
Dept: NEUROLOGY | Facility: CLINIC | Age: 19
End: 2024-11-05
Payer: COMMERCIAL

## 2024-11-05 VITALS
BODY MASS INDEX: 37.34 KG/M2 | HEART RATE: 80 BPM | SYSTOLIC BLOOD PRESSURE: 110 MMHG | OXYGEN SATURATION: 97 % | WEIGHT: 202.9 LBS | HEIGHT: 62 IN | DIASTOLIC BLOOD PRESSURE: 72 MMHG

## 2024-11-05 DIAGNOSIS — R68.89 SPELLS OF DECREASED ATTENTIVENESS: ICD-10-CM

## 2024-11-05 DIAGNOSIS — E34.8 CYST OF PINEAL GLAND: ICD-10-CM

## 2024-11-05 DIAGNOSIS — G43.E19 INTRACTABLE CHRONIC MIGRAINE WITH AURA AND WITHOUT STATUS MIGRAINOSUS: Primary | ICD-10-CM

## 2024-11-05 DIAGNOSIS — G43.E19 INTRACTABLE CHRONIC MIGRAINE WITH AURA AND WITHOUT STATUS MIGRAINOSUS: ICD-10-CM

## 2024-11-05 PROCEDURE — 99204 OFFICE O/P NEW MOD 45 MIN: CPT | Performed by: PSYCHIATRY & NEUROLOGY

## 2024-11-05 RX ORDER — TOPIRAMATE 100 MG/1
100 TABLET, FILM COATED ORAL DAILY
Qty: 90 TABLET | OUTPATIENT
Start: 2024-11-05

## 2024-11-05 RX ORDER — TOPIRAMATE 100 MG/1
100 TABLET, FILM COATED ORAL DAILY
Qty: 30 TABLET | Refills: 1 | Status: SHIPPED | OUTPATIENT
Start: 2024-11-05

## 2024-11-05 RX ORDER — TOPIRAMATE 25 MG/1
TABLET, FILM COATED ORAL
Qty: 14 TABLET | Refills: 0 | Status: SHIPPED | OUTPATIENT
Start: 2024-11-05

## 2024-11-05 NOTE — LETTER
November 5, 2024     JOSE Mcadams  [address] [address]    Patient: Amarilis Vinson   YOB: 2005   Date of Visit: 11/5/2024     Dear JOSE Mcadams:       Thank you for referring Amarilis Vinson to me for evaluation. Below are the relevant portions of my assessment and plan of care.    If you have questions, please do not hesitate to call me. I look forward to following Amarilis along with you.         Sincerely,        Valdo Donovan MD        CC: No Recipients    Valdo Donovan MD  11/05/24 1323  Sign when Signing Visit  Date of visit: 11/5/2024   Patient ID: Amarilis Vinson  Age: 19 y.o.     Chief compliant:   Chief Complaint   Patient presents with   • Migraine     NP       HPI:      Amarilis Vinson is a 19 y.o. right-handed female with hypertension, type 2 diabetes, Graves' disease, anxiety, depression, ADHD, migraines who presents for evaluation of multiple neurologic concerns.    Headaches:     She reports she's been having headaches all of her life. Mom who is present at this appointment reports she has been complaining of headaches since she was 1-2 years old.  Mom reports multiple providers with question this is people do not typically complain of head pain this young. They have become more intense and frequent with time.     Headaches start fairly suddenly, peak intensity in a snap of the fingers. Headaches often last up to 4 days. She states she has more days with headache than she does not. She will try to sleep, but her head is too sensitive for the pillow. She denies it going away with sleep and will wake her up. She reports light and sound bothers her, but also the dark bothers her. She will have nausea and vomiting with headaches. There is a variety of sharp pains and throbbing that more often start on the left side. It may be associated with vision changes, particularly complete loss of vision of left eye. This only happens with migraines. No facial  "changes, ptosis, abnormal sweating. No clear triggers. She has about 12 headache free days per month. No minor headaches otherwise. It is worse when she is active and is triggered by \"everything\".    Denies any migraine specific medications or trials. She reports seeing a neurologist around 2011 in [place]. Some oral medications were discussed, but cannot recall any of the names.       Current medications:  Topiramate 50 mg for the last month (for seizures and migraines) - ineffective  Lamotrigine 100 mg daily for the past 2-3 months (no benefit) - ineffective  Sertraline since middle school - ineffective  Latuda for 2-3 months    Prior medications (side effects):  Abilify made her angry  Lexapro (ineffective)  HCTZ (ineffective)  Maxalt (ineffective)  Nurtec (ineffective)  Excedrin helps occasionally. She takes them regularly on headache days      Seizures:    She reports feeling like she might pass out and lightheaded. Her eyes will be closed and she is unable to move. She can hear people talking, but she doesn't know what they are saying to her. She estimates this lasts about a minute at a time. She doesn't believe she has shakes or jerks, but she reports her  has told her she may. This reports this has been ongoing since middle school, but has worsened with time. This occurs about 2-3 times per week. There are no clear triggers. No other staring spells. No nocturnal injuries. She has seen a reduction in seizures since starting topiramate.     Seizure risk factors:  Family history of seizures: No  Febrile seizures: No  Head trauma or concussions: No  Brain tumor: No  History of stroke or vascular abnormality: No  Infections (abscess/meningitis/encephalitis): No  Birth injuries: premature possibly 5 weeks, 4 lb and 3 oz.   Developmental delays: biological mom did unspecified drugs and tobacco in pregnancy. Delayed in classes with 504 plan. Diagnosed with ADHD. No physical delays.  Drug use: No      She " discusses ongoing mental health issues. She reports having issues since before middle school. She reports having anxiety and depression. This has recently gotten worse around this last year. She has been connected with a psychiatrist and a therapist and has been diagnosed with bipolar disorder. Per chart review, she has also listed diagnosis of borderline personality disorder. They report that her psychiatrist thinks the pineal cyst is causing bipolar symptoms.  Patient is very concerned about the cyst as she reports she has pictures of her brain in her closet and the cyst is not on this.  Mom reports the pictures from when she was a child and are unrelated.      Tobacco/substance use: denies   Family history: patient is adopted. No medical history provided. Possibly bipolar in father    Review of Systems   Eyes:  Positive for visual disturbance (blackout vision problem with migraines).   Musculoskeletal:  Positive for gait problem (balance problem, without falls).   Neurological:  Positive for dizziness, seizures, syncope and headaches.   Psychiatric/Behavioral:  Positive for confusion and decreased concentration.         The following portions of the patient's history were reviewed and updated as appropriate: allergies, current medications, past family history, past medical history, past social history, past surgical history and problem list.    Vitals:    11/05/24 0908   BP: 110/72   Pulse: 80   SpO2: 97%       Neurological Exam  Mental Status  Awake, alert and oriented to person, place and time. Recent and remote memory are intact. Speech is normal. Language is fluent with no aphasia. Attention and concentration are normal. Fund of knowledge is appropriate for level of education.    Cranial Nerves  CN II: Visual acuity is normal. Visual fields full to confrontation. Right funduscopic exam: disc intact. Left funduscopic exam: disc intact.  CN III, IV, VI: Extraocular movements intact bilaterally. Normal lids and  orbits bilaterally. Pupils equal round and reactive to light bilaterally.  CN V: Facial sensation is normal.  CN VII: Full and symmetric facial movement.  CN IX, X: Palate elevates symmetrically  CN XI: Shoulder shrug strength is normal.  CN XII: Tongue midline without atrophy or fasciculations.    Motor  Normal muscle bulk throughout. Normal muscle tone. Strength is 5/5 in all four extremities except as noted.    Sensory  Light touch is normal in upper and lower extremities. Pinprick is normal in upper and lower extremities. Vibration is normal in upper and lower extremities.     Reflexes                                            Right                      Left  Brachioradialis                    2+                         2+  Biceps                                 2+                         2+  Triceps                                2+                         2+  Patellar                                2+                         2+  Achilles                                2+                         2+  Right Plantar: downgoing  Left Plantar: downgoing    Right pathological reflexes: Ankle clonus absent.  Left pathological reflexes: Ankle clonus absent.    Coordination    Finger-to-nose, rapid alternating movements and heel-to-shin normal bilaterally without dysmetria.    Gait  Casual gait is normal including stance, stride, and arm swing. Romberg is absent.      Physical Exam  HENT:      Head:      Comments: Left occipital notch tenderness. Mild scalp tenderness to the left temple  Eyes:      General: Lids are normal.      Extraocular Movements: Extraocular movements intact.      Pupils: Pupils are equal, round, and reactive to light.   Neurological:      Coordination: Coordination is intact. Romberg sign negative.      Deep Tendon Reflexes:      Reflex Scores:       Tricep reflexes are 2+ on the right side and 2+ on the left side.       Bicep reflexes are 2+ on the right side and 2+ on the left side.        Brachioradialis reflexes are 2+ on the right side and 2+ on the left side.       Patellar reflexes are 2+ on the right side and 2+ on the left side.       Achilles reflexes are 2+ on the right side and 2+ on the left side.  Psychiatric:         Speech: Speech normal.         Imaging: Radiology report reviewed and study personally reviewed: MRI of the brain reviewed with 6 mm pineal cyst.  Appears nonobstructive with no local transependymal flow of CSF.  No other lesions of concern noted  Labs reviewed including CMP, TSH    Assessment/Plan:    Amarilis Vinson is a 19 y.o. female presenting with multiple neurological concerns including intractable migraines since she was less than 1 years old, spells of altered awareness have been ongoing since middle school, and episodic vision loss in her left eye associated with her headaches.  With recent MRI of the brain demonstrating a pineal cyst, she reports concern that this may be related to her headaches as well as recent worsening of psychiatric symptoms (anxiety, depression, bipolar disorder).  MRI findings appear incidental and unrelated.  Patient's headaches do raise concern for migraines based on degree of disability, and associated nausea and vomiting, throbbing description, although atypical describes fairly rapid onset of headache.  Patient has otherwise not been on multiple trials of headache medications in the past until more recently.  Will further increase topiramate to 100 mg to maximize benefit, and initiate Qulipta.     Spells of altered awareness during which patient can hear people and not clearly lose consciousness, but frozen and unable to respond right suspicion for nonepileptic spells.  Not clearly consistent with syncopal episodes.  Patient does have risk for epilepsy including prematurity and developmental delays, but otherwise without significant intracranial or structural risk factor.  Will perform an EEG to evaluate further.  She is currently on  both topiramate and lamotrigine at this time.  Will have to take into account her spells if these medications are adjusted as currently being used for other reasons then seizure prevention.         Diagnoses and all orders for this visit:    1. Intractable chronic migraine with aura and without status migrainosus (Primary)  -     topiramate (Topamax) 25 MG tablet; Please take the 25 mg tablet for two weeks in addition to your 50 mg topiramate tablet for total of 75 mg daily, then STOP the 50 mg and 25 mg tablets and take 1 100 mg tablet daily  Dispense: 14 tablet; Refill: 0  -     topiramate (TOPAMAX) 100 MG tablet; Take 1 tablet by mouth Daily. Please take the 25 mg tablet for two weeks in addition to your 50 mg topiramate tablet for total of 75 mg daily, then STOP the 50 mg and 25 mg tablets and take 1 100 mg tablet daily  Dispense: 30 tablet; Refill: 1  -     Atogepant 60 MG tablet; Take 1 tablet by mouth Daily.  Dispense: 30 tablet; Refill: 1    2. Spells of decreased attentiveness  -     EEG (Hospital Performed); Future  -     Ambulatory Referral to Neurosurgery    3. Cyst of pineal gland  -     Ambulatory Referral to Neurosurgery             Valdo Donovan MD

## 2024-11-05 NOTE — PATIENT INSTRUCTIONS
Please take the 25 mg tablet for two weeks in addition to your 50 mg topiramate tablet for total of 75 mg daily, then STOP the 50 mg and 25 mg tablets and take 1 100 mg tablet daily

## 2024-11-05 NOTE — PROGRESS NOTES
"Date of visit: 11/5/2024   Patient ID: Amarilis Vinson  Age: 19 y.o.     Chief compliant:   Chief Complaint   Patient presents with    Migraine     NP       HPI:      Amarilis Vinson is a 19 y.o. right-handed female with hypertension, type 2 diabetes, Graves' disease, anxiety, depression, ADHD, migraines who presents for evaluation of multiple neurologic concerns.    Headaches:     She reports she's been having headaches all of her life. Mom who is present at this appointment reports she has been complaining of headaches since she was 1-2 years old.  Mom reports multiple providers with question this is people do not typically complain of head pain this young. They have become more intense and frequent with time.     Headaches start fairly suddenly, peak intensity in a snap of the fingers. Headaches often last up to 4 days. She states she has more days with headache than she does not. She will try to sleep, but her head is too sensitive for the pillow. She denies it going away with sleep and will wake her up. She reports light and sound bothers her, but also the dark bothers her. She will have nausea and vomiting with headaches. There is a variety of sharp pains and throbbing that more often start on the left side. It may be associated with vision changes, particularly complete loss of vision of left eye. This only happens with migraines. No facial changes, ptosis, abnormal sweating. No clear triggers. She has about 12 headache free days per month. No minor headaches otherwise. It is worse when she is active and is triggered by \"everything\".    Denies any migraine specific medications or trials. She reports seeing a neurologist around 2011 in [place]. Some oral medications were discussed, but cannot recall any of the names.       Current medications:  Topiramate 50 mg for the last month (for seizures and migraines) - ineffective  Lamotrigine 100 mg daily for the past 2-3 months (no benefit) - ineffective  Sertraline " since middle school - ineffective  Latuda for 2-3 months    Prior medications (side effects):  Abilify made her angry  Lexapro (ineffective)  HCTZ (ineffective)  Maxalt (ineffective)  Nurtec (ineffective)  Excedrin helps occasionally. She takes them regularly on headache days      Seizures:    She reports feeling like she might pass out and lightheaded. Her eyes will be closed and she is unable to move. She can hear people talking, but she doesn't know what they are saying to her. She estimates this lasts about a minute at a time. She doesn't believe she has shakes or jerks, but she reports her  has told her she may. This reports this has been ongoing since middle school, but has worsened with time. This occurs about 2-3 times per week. There are no clear triggers. No other staring spells. No nocturnal injuries. She has seen a reduction in seizures since starting topiramate.     Seizure risk factors:  Family history of seizures: No  Febrile seizures: No  Head trauma or concussions: No  Brain tumor: No  History of stroke or vascular abnormality: No  Infections (abscess/meningitis/encephalitis): No  Birth injuries: premature possibly 5 weeks, 4 lb and 3 oz.   Developmental delays: biological mom did unspecified drugs and tobacco in pregnancy. Delayed in classes with 504 plan. Diagnosed with ADHD. No physical delays.  Drug use: No      She discusses ongoing mental health issues. She reports having issues since before middle school. She reports having anxiety and depression. This has recently gotten worse around this last year. She has been connected with a psychiatrist and a therapist and has been diagnosed with bipolar disorder. Per chart review, she has also listed diagnosis of borderline personality disorder. They report that her psychiatrist thinks the pineal cyst is causing bipolar symptoms.  Patient is very concerned about the cyst as she reports she has pictures of her brain in her closet and the cyst is  not on this.  Mom reports the pictures from when she was a child and are unrelated.      Tobacco/substance use: denies   Family history: patient is adopted. No medical history provided. Possibly bipolar in father    Review of Systems   Eyes:  Positive for visual disturbance (blackout vision problem with migraines).   Musculoskeletal:  Positive for gait problem (balance problem, without falls).   Neurological:  Positive for dizziness, seizures, syncope and headaches.   Psychiatric/Behavioral:  Positive for confusion and decreased concentration.         The following portions of the patient's history were reviewed and updated as appropriate: allergies, current medications, past family history, past medical history, past social history, past surgical history and problem list.    Vitals:    11/05/24 0908   BP: 110/72   Pulse: 80   SpO2: 97%       Neurological Exam  Mental Status  Awake, alert and oriented to person, place and time. Recent and remote memory are intact. Speech is normal. Language is fluent with no aphasia. Attention and concentration are normal. Fund of knowledge is appropriate for level of education.    Cranial Nerves  CN II: Visual acuity is normal. Visual fields full to confrontation. Right funduscopic exam: disc intact. Left funduscopic exam: disc intact.  CN III, IV, VI: Extraocular movements intact bilaterally. Normal lids and orbits bilaterally. Pupils equal round and reactive to light bilaterally.  CN V: Facial sensation is normal.  CN VII: Full and symmetric facial movement.  CN IX, X: Palate elevates symmetrically  CN XI: Shoulder shrug strength is normal.  CN XII: Tongue midline without atrophy or fasciculations.    Motor  Normal muscle bulk throughout. Normal muscle tone. Strength is 5/5 in all four extremities except as noted.    Sensory  Light touch is normal in upper and lower extremities. Pinprick is normal in upper and lower extremities. Vibration is normal in upper and lower extremities.      Reflexes                                            Right                      Left  Brachioradialis                    2+                         2+  Biceps                                 2+                         2+  Triceps                                2+                         2+  Patellar                                2+                         2+  Achilles                                2+                         2+  Right Plantar: downgoing  Left Plantar: downgoing    Right pathological reflexes: Ankle clonus absent.  Left pathological reflexes: Ankle clonus absent.    Coordination    Finger-to-nose, rapid alternating movements and heel-to-shin normal bilaterally without dysmetria.    Gait  Casual gait is normal including stance, stride, and arm swing. Romberg is absent.      Physical Exam  HENT:      Head:      Comments: Left occipital notch tenderness. Mild scalp tenderness to the left temple  Eyes:      General: Lids are normal.      Extraocular Movements: Extraocular movements intact.      Pupils: Pupils are equal, round, and reactive to light.   Neurological:      Coordination: Coordination is intact. Romberg sign negative.      Deep Tendon Reflexes:      Reflex Scores:       Tricep reflexes are 2+ on the right side and 2+ on the left side.       Bicep reflexes are 2+ on the right side and 2+ on the left side.       Brachioradialis reflexes are 2+ on the right side and 2+ on the left side.       Patellar reflexes are 2+ on the right side and 2+ on the left side.       Achilles reflexes are 2+ on the right side and 2+ on the left side.  Psychiatric:         Speech: Speech normal.         Imaging: Radiology report reviewed and study personally reviewed: MRI of the brain reviewed with 6 mm pineal cyst.  Appears nonobstructive with no local transependymal flow of CSF.  No other lesions of concern noted  Labs reviewed including CMP, TSH    Assessment/Plan:    Amarilis Vinson is a 19 y.o. female  presenting with multiple neurological concerns including intractable migraines since she was less than 1 years old, spells of altered awareness have been ongoing since middle school, and episodic vision loss in her left eye associated with her headaches.  With recent MRI of the brain demonstrating a pineal cyst, she reports concern that this may be related to her headaches as well as recent worsening of psychiatric symptoms (anxiety, depression, bipolar disorder).  MRI findings appear incidental and unrelated.  Patient's headaches do raise concern for migraines based on degree of disability, and associated nausea and vomiting, throbbing description, although atypical describes fairly rapid onset of headache.  Patient has otherwise not been on multiple trials of headache medications in the past until more recently.  Will further increase topiramate to 100 mg to maximize benefit, and initiate Qulipta.     Spells of altered awareness during which patient can hear people and not clearly lose consciousness, but frozen and unable to respond right suspicion for nonepileptic spells.  Not clearly consistent with syncopal episodes.  Patient does have risk for epilepsy including prematurity and developmental delays, but otherwise without significant intracranial or structural risk factor.  Will perform an EEG to evaluate further.  She is currently on both topiramate and lamotrigine at this time.  Will have to take into account her spells if these medications are adjusted as currently being used for other reasons then seizure prevention.         Diagnoses and all orders for this visit:    1. Intractable chronic migraine with aura and without status migrainosus (Primary)  -     topiramate (Topamax) 25 MG tablet; Please take the 25 mg tablet for two weeks in addition to your 50 mg topiramate tablet for total of 75 mg daily, then STOP the 50 mg and 25 mg tablets and take 1 100 mg tablet daily  Dispense: 14 tablet; Refill: 0  -      topiramate (TOPAMAX) 100 MG tablet; Take 1 tablet by mouth Daily. Please take the 25 mg tablet for two weeks in addition to your 50 mg topiramate tablet for total of 75 mg daily, then STOP the 50 mg and 25 mg tablets and take 1 100 mg tablet daily  Dispense: 30 tablet; Refill: 1  -     Atogepant 60 MG tablet; Take 1 tablet by mouth Daily.  Dispense: 30 tablet; Refill: 1    2. Spells of decreased attentiveness  -     EEG (Hospital Performed); Future  -     Ambulatory Referral to Neurosurgery    3. Cyst of pineal gland  -     Ambulatory Referral to Neurosurgery             Valdo Donovan MD

## 2024-11-06 DIAGNOSIS — F31.81 BIPOLAR II DISORDER: ICD-10-CM

## 2024-11-06 DIAGNOSIS — F41.1 GENERALIZED ANXIETY DISORDER: ICD-10-CM

## 2024-11-06 DIAGNOSIS — G43.E19 INTRACTABLE CHRONIC MIGRAINE WITH AURA AND WITHOUT STATUS MIGRAINOSUS: ICD-10-CM

## 2024-11-06 DIAGNOSIS — F33.2 SEVERE EPISODE OF RECURRENT MAJOR DEPRESSIVE DISORDER, WITHOUT PSYCHOTIC FEATURES: ICD-10-CM

## 2024-11-06 RX ORDER — LURASIDONE HYDROCHLORIDE 40 MG/1
40 TABLET, FILM COATED ORAL DAILY
Qty: 30 TABLET | Refills: 1 | Status: SHIPPED | OUTPATIENT
Start: 2024-11-06

## 2024-11-06 RX ORDER — LURASIDONE HYDROCHLORIDE 20 MG/1
20 TABLET, FILM COATED ORAL DAILY
Qty: 30 TABLET | Refills: 2 | OUTPATIENT
Start: 2024-11-06

## 2024-11-06 NOTE — TELEPHONE ENCOUNTER
LURASIDONE 20MG TABLETS     The original prescription was reordered on 9/24/2024 by Valencia Golden APRN. Renewing this prescription may not be appropriate.       Last ordered: 2 months ago (8/28/2024) by JOSE Rodriguez   LURASIDONE 40MG TABLETS     Last ordered: 1 month ago (9/24/2024) by JOSE Rodriguez     Follow up 11/18/2024

## 2024-11-07 ENCOUNTER — OFFICE VISIT (OUTPATIENT)
Age: 19
End: 2024-11-07
Payer: COMMERCIAL

## 2024-11-07 ENCOUNTER — PATIENT ROUNDING (BHMG ONLY) (OUTPATIENT)
Dept: NEUROLOGY | Facility: CLINIC | Age: 19
End: 2024-11-07
Payer: COMMERCIAL

## 2024-11-07 VITALS
DIASTOLIC BLOOD PRESSURE: 122 MMHG | BODY MASS INDEX: 37.6 KG/M2 | OXYGEN SATURATION: 99 % | TEMPERATURE: 96.9 F | HEIGHT: 62 IN | SYSTOLIC BLOOD PRESSURE: 179 MMHG | HEART RATE: 95 BPM | WEIGHT: 204.31 LBS

## 2024-11-07 DIAGNOSIS — E66.812 CLASS 2 SEVERE OBESITY DUE TO EXCESS CALORIES WITH SERIOUS COMORBIDITY AND BODY MASS INDEX (BMI) OF 37.0 TO 37.9 IN ADULT: ICD-10-CM

## 2024-11-07 DIAGNOSIS — E11.42 TYPE 2 DIABETES MELLITUS WITH DIABETIC POLYNEUROPATHY, WITH LONG-TERM CURRENT USE OF INSULIN: ICD-10-CM

## 2024-11-07 DIAGNOSIS — E11.65 TYPE 2 DIABETES MELLITUS WITH HYPERGLYCEMIA, WITH LONG-TERM CURRENT USE OF INSULIN: Primary | ICD-10-CM

## 2024-11-07 DIAGNOSIS — Z46.81 INSULIN PUMP TITRATION: ICD-10-CM

## 2024-11-07 DIAGNOSIS — I10 PRIMARY HYPERTENSION: ICD-10-CM

## 2024-11-07 DIAGNOSIS — Z97.8 USES SELF-APPLIED CONTINUOUS GLUCOSE MONITORING DEVICE: ICD-10-CM

## 2024-11-07 DIAGNOSIS — Z79.4 TYPE 2 DIABETES MELLITUS WITH HYPERGLYCEMIA, WITH LONG-TERM CURRENT USE OF INSULIN: Primary | ICD-10-CM

## 2024-11-07 DIAGNOSIS — Z79.4 TYPE 2 DIABETES MELLITUS WITH DIABETIC POLYNEUROPATHY, WITH LONG-TERM CURRENT USE OF INSULIN: ICD-10-CM

## 2024-11-07 DIAGNOSIS — E66.01 CLASS 2 SEVERE OBESITY DUE TO EXCESS CALORIES WITH SERIOUS COMORBIDITY AND BODY MASS INDEX (BMI) OF 37.0 TO 37.9 IN ADULT: ICD-10-CM

## 2024-11-07 PROCEDURE — 99214 OFFICE O/P EST MOD 30 MIN: CPT

## 2024-11-07 PROCEDURE — 95251 CONT GLUC MNTR ANALYSIS I&R: CPT

## 2024-11-07 RX ORDER — DAPAGLIFLOZIN 10 MG/1
10 TABLET, FILM COATED ORAL DAILY
Qty: 90 TABLET | Refills: 3 | Status: SHIPPED | OUTPATIENT
Start: 2024-11-07

## 2024-11-07 RX ORDER — PIOGLITAZONEHYDROCHLORIDE 30 MG/1
30 TABLET ORAL DAILY
Qty: 90 TABLET | Refills: 3 | Status: SHIPPED | OUTPATIENT
Start: 2024-11-07 | End: 2025-11-02

## 2024-11-07 NOTE — PROGRESS NOTES
"Chief Complaint  Diabetes    Referred By: No ref. provider found    Subjective          Amarilis Vinson presents to [facility] for insulin pump management    History of Present Illness    Visit type:  follow-up  Diabetes type:  Type 2  Current diabetes status/concerns/issues: On November 5 patient had persistent hypoglycemia in the evening hours prior to bed.  Other health concerns:  Hypertension  Current Diabetes symptoms:    Polyuria: Yes     Polydipsia: Yes     Polyphagia: No   Blurred vision: Yes     Excessive fatigue: Yes    Known Diabetes complications:  Neuropathy: Numbness; Location: Hands and Bilateral  Renal: No current urine microalbumin available and Normal eGFR per current labs  Eyes: No Retinopathy reported on current eye exam; Location: Bilateral  Amputation/Wounds: None  GI: Nausea  Cardiovascular: Hypertension, Hyperlipidemia, Hypercholesterolemia, and Hypertriglyceridemia  ED: N/A  Other: None  Hypoglycemia:  None reported at this time  Hypoglycemia Symptoms:  No hypoglycemia at this time  Current diabetes treatment: NovoLog via tandem insulin pump, Farxiga 10 mg daily  Blood glucose device:  Dexcom CGM  Blood glucose monitoring frequency:  Continuous per CGM  Blood glucose range/average:  The 14-day sensor report shows an average glucose of 232mg/dL, with 26% in target range ( mgdL), 33% in the high range (181-250 mg/dL), 41% in the very high range (>250 mg/dL), 0% in the low range (54-70 mg/dL) and <1% in the very low range (<54 mg/dL).   Glucose Source: Device Reviewed  Diet:  \"Eat what I want\"/No diet plan, Number of meals each day - 2-3; Number of snacks each day - 1-2  Activity/Exercise:   Active with work    Past Medical History:   Diagnosis Date    Anxiety     COVID 08/07/2022    NO CURRENT SYMPTOMS    Diabetes mellitus     Foot pain, left     Hypertension     Migraines      Past Surgical History:   Procedure Laterality Date    FOOT SURGERY      DISTAL " LEFT FIRST METATARSAL    HARDWARE REMOVAL Left 8/26/2022    Procedure: HARDWARE REMOVAL;  left first metatarsal shaft;  Surgeon: Michel Jaffe DPM;  Location: [facility] MAIN OR;  Service: Podiatry;  Laterality: Left;    ULNAR OSTEOPLASTY  2018     Family History   Adopted: Yes   Problem Relation Age of Onset    No Known Problems Mother     No Known Problems Father      Social History     Socioeconomic History    Marital status:    Tobacco Use    Smoking status: Never    Smokeless tobacco: Never   Vaping Use    Vaping status: Never Used   Substance and Sexual Activity    Alcohol use: Not Currently    Drug use: Not Currently    Sexual activity: Yes     Allergies   Allergen Reactions    Novolog [Insulin Aspart] Hives       Current Outpatient Medications:     aspirin-acetaminophen-caffeine (EXCEDRIN MIGRAINE) 250-250-65 MG per tablet, Take  by mouth., Disp: , Rfl:     Atogepant 60 MG tablet, Take 1 tablet by mouth Daily., Disp: 30 tablet, Rfl: 1    atorvastatin (LIPITOR) 20 MG tablet, Take 1 tablet by mouth Every Night., Disp: 90 tablet, Rfl: 1    BD Pen Needle Amberly 2nd Gen 32G X 4 MM misc, Use 1 each 5 (Five) Times a Day. use as directed, Disp: 200 each, Rfl: 3    chlorthalidone (HYGROTON) 25 MG tablet, Take 1 tablet by mouth Daily., Disp: 30 tablet, Rfl: 1    Continuous Glucose Sensor (Dexcom G7 Sensor) misc, Use 1 each Every 10 (Ten) Days for 180 days., Disp: 3 each, Rfl: 5    dapagliflozin Propanediol (Farxiga) 10 MG tablet, Take 10 mg by mouth Daily., Disp: 90 tablet, Rfl: 3    glucose blood (OneTouch Verio) test strip, Use as instructed for verification and calibration of DEXCOM G7 up to three times daily., Disp: 100 each, Rfl: 11    Insulin Lispro-aabc (Jaspreet Da Silva) 200 UNIT/ML solution pen-injector, Inject 80 Units under the skin into the appropriate area as directed 3 (Three) Times a Day Before Meals for 180 days., Disp: 110 mL, Rfl: 1    lamoTRIgine (LaMICtal) 100 MG tablet, Take 1 tablet by  "mouth Daily., Disp: 30 tablet, Rfl: 1    levothyroxine (Synthroid) 25 MCG tablet, Take 1 tablet by mouth Every Morning., Disp: 30 tablet, Rfl: 1    lisinopril (PRINIVIL,ZESTRIL) 40 MG tablet, TAKE 1 TABLET BY MOUTH DAILY, Disp: 90 tablet, Rfl: 1    lurasidone (LATUDA) 40 MG tablet tablet, TAKE 1 TABLET BY MOUTH DAILY, Disp: 30 tablet, Rfl: 1    meclizine (ANTIVERT) 25 MG tablet, Take 1 tablet by mouth 3 (Three) Times a Day As Needed for Dizziness., Disp: 10 tablet, Rfl: 0    Norethin-Eth Estrad-Fe Biphas (Lo Loestrin Fe) 1 MG-10 MCG / 10 MCG tablet, Take 1 tablet by mouth Daily., Disp: 28 tablet, Rfl: 3    omeprazole (priLOSEC) 20 MG capsule, Take 1 capsule by mouth Daily., Disp: 90 capsule, Rfl: 1    ondansetron ODT (ZOFRAN-ODT) 4 MG disintegrating tablet, Place 1 tablet on the tongue Every 4 (Four) Hours As Needed for Nausea or Vomiting., Disp: 15 tablet, Rfl: 0    sertraline (ZOLOFT) 100 MG tablet, Take 1 tablet by mouth Daily., Disp: 30 tablet, Rfl: 1    topiramate (TOPAMAX) 100 MG tablet, Take 1 tablet by mouth Daily. Please take the 25 mg tablet for two weeks in addition to your 50 mg topiramate tablet for total of 75 mg daily, then STOP the 50 mg and 25 mg tablets and take 1 100 mg tablet daily, Disp: 30 tablet, Rfl: 1    topiramate (Topamax) 25 MG tablet, Please take the 25 mg tablet for two weeks in addition to your 50 mg topiramate tablet for total of 75 mg daily, then STOP the 50 mg and 25 mg tablets and take 1 100 mg tablet daily, Disp: 14 tablet, Rfl: 0    Alcohol Swabs pads, Use 1 Pad Daily., Disp: 100 each, Rfl: 1    glucose monitor monitoring kit, Use 1 each Daily., Disp: 1 each, Rfl: 0    Insulin Syringe 31G X 5/16\" 1 ML misc, Use 1 syringe Daily., Disp: 100 each, Rfl: 1    Lancets (freestyle) lancets, Use 3 times per day as needed to check blood sugars, Disp: 100 each, Rfl: 4    pioglitazone (Actos) 30 MG tablet, Take 1 tablet by mouth Daily for 360 days., Disp: 90 tablet, Rfl: 3    Objective " "    Vitals:    11/07/24 1248   BP: (!) 179/122   BP Location: Left arm   Patient Position: Sitting   Pulse: 95   Temp: 96.9 °F (36.1 °C)   TempSrc: Temporal   SpO2: 99%   Weight: 92.7 kg (204 lb 5 oz)   Height: 157.5 cm (62\")     Body mass index is 37.37 kg/m².    Physical Exam    Result Review :   The following data was reviewed by: JOSE Bliss on 11/07/2024:    Most Recent A1C          10/7/2024    13:32   HGBA1C Most Recent   Hemoglobin A1C 9.6        A1C Last 3 Results          8/2/2024    09:40 8/14/2024    10:41 10/7/2024    13:32   HGBA1C Last 3 Results   Hemoglobin A1C 9.40  9.20  9.6      A1c collected on 10/7/2024  is 9.6%, indicating Uncontrolled Type II diabetes.  This result is up from the prior result of 9.2% collected on 8/14/2024    Glucose   Date Value Ref Range Status   10/07/2024 227 (H) 70 - 99 mg/dL Final     Comment:     Serial Number: [id number]:  [id number]     Creatinine   Date Value Ref Range Status   10/25/2024 0.84 0.57 - 1.00 mg/dL Final   09/25/2024 0.67 0.57 - 1.00 mg/dL Final     eGFR   Date Value Ref Range Status   10/25/2024 101.5 >60.0 mL/min/1.73 Final   09/25/2024 127.7 >60.0 mL/min/1.73 Final     Labs collected on 10/25/2024 show Normal values    Microalbumin, Urine   Date Value Ref Range Status   02/06/2024 1.4 mg/dL Final   12/09/2022 2.4 mg/dL Final     Total Cholesterol   Date Value Ref Range Status   08/14/2024 235 (H) 0 - 200 mg/dL Final   05/14/2024 182 0 - 200 mg/dL Final   05/06/2019 128 <=199 mg/dL Final     Comment:     Age Range       Desirable       Borderline High       High Risk  Child/Adol.    <170 mg/dL      170-199 mg/dL         >=200 mg/dL  Adult          <200 mg/dL      200-239 mg/dL         >=240 mg/dL   07/18/2018 130 <=199 mg/dL Final     Comment:     Age Range       Desirable       Borderline High       High Risk  Child/Adol.    <170 mg/dL      170-199 mg/dL         >=200 mg/dL  Adult          <200 mg/dL      200-239 mg/dL         >=240 " mg/dL     Triglycerides   Date Value Ref Range Status   08/14/2024 480 (H) 0 - 150 mg/dL Final   05/14/2024 373 (H) 0 - 150 mg/dL Final   05/06/2019 124 <=129 mg/dL Final     Comment:     Age Range      Acceptable     Borderline High    High           Very High  Child(2-9Yrs)  <75 mg/dL      75-99 mg/dL       >=100 mg/dL  Adolescent  10-18 Yrs      <90 mg/dL       mg/dL      >=130 mg/dL  Adult  >18 Yrs        <150 mg/dL     150-199 mg/dL     200-499 mg/dL    >=500mg/dL   07/18/2018 84 <=129 mg/dL Final     Comment:     Age Range      Acceptable     Borderline High    High           Very High  Child(2-9Yrs)  <75 mg/dL      75-99 mg/dL       >=100 mg/dL  Adolescent  10-18 Yrs      <90 mg/dL       mg/dL      >=130 mg/dL  Adult  >18 Yrs        <150 mg/dL     150-199 mg/dL     200-499 mg/dL    >=500mg/dL     HDL Cholesterol   Date Value Ref Range Status   08/14/2024 37 (L) 40 - 60 mg/dL Final   05/14/2024 30 (L) 40 - 60 mg/dL Final     LDL Cholesterol    Date Value Ref Range Status   08/14/2024 114 (H) 0 - 100 mg/dL Final   05/14/2024 90 0 - 100 mg/dL Final     LDL Chol Calc (NIH)   Date Value Ref Range Status   05/06/2019 75 <=129 mg/dL Final     Comment:     Age Range         Acceptable      Borderline High       High  Child/Adol.      <110 mg/dL       110-129 mg/dL        >=130 mg/dL  Adult            <100 mg/dL       100-159 mg/dL        >=160mg/dL   07/18/2018 76 <=129 mg/dL Final     Comment:     Age Range         Acceptable      Borderline High       High  Child/Adol.      <110 mg/dL       110-129 mg/dL        >=130 mg/dL  Adult            <100 mg/dL       100-159 mg/dL        >=160mg/dL     Lipid panel collected on 8/14/2024 shows Hyperlipidemia, Hypercholesterolemia, Hypertriglyceridemia, and low HDL            Assessment: Patient presents for 6-week follow-up.  Patient recently transitioned to Humalog U-200 in her Tandem t:slim X2 insulin pump.  Patient states that after a couple of days her  infusion site becomes sensitive to the amount of insulin currently being used.  Patient is averaging approximately 100 units of insulin per day with 59% of that coming from basal and 41% provided as bolus/mealtime.  Patient states that she consistently eats the same thing for breakfast, a bledsoe egg and cheese bagel.  Patient states that she is currently out of [facility] and [facility] did not fill it.  Patient is tolerating the addition of Actos 15 mg once daily without fluid retention.  Patient is hypertensive in the office today with a blood pressure of 168/117 manual, and she states that she forgot to take her blood pressure medication this morning and she has been out of Farxiga.  Patient's insulin pump report was unable to be downloaded due to technical difficulties, but settings were reviewed on her physical pump.  Patient states that her phone disconnected from her pump and she was not able to reconnect it.  Patient has experienced a 2 pound weight loss since her last office visit, with a current BMI of 37.37 kg/m².      Diagnoses and all orders for this visit:    1. Type 2 diabetes mellitus with hyperglycemia, with long-term current use of insulin (Primary)  -     dapagliflozin Propanediol (Farxiga) 10 MG tablet; Take 10 mg by mouth Daily.  Dispense: 90 tablet; Refill: 3  -     pioglitazone (Actos) 30 MG tablet; Take 1 tablet by mouth Daily for 360 days.  Dispense: 90 tablet; Refill: 3    2. Type 2 diabetes mellitus with diabetic polyneuropathy, with long-term current use of insulin    3. Primary hypertension    4. Insulin pump titration    5. Uses self-applied continuous glucose monitoring device    6. Class 2 severe obesity due to excess calories with serious comorbidity and body mass index (BMI) of 37.0 to 37.9 in adult        Plan: Patient's Actos was increased to 30 mg once daily, and a refill of Farxiga sent to the pharmacy.  Following changes were made to her insulin pump settings: At 07 100 her basal  was increased from 1.29 units/h to 1.35 units/h, her correction factor was changed from 1 unit for every 30 mg/dL to 1 unit for every 28 g/dL, and her carb insulin ratio was decreased from 1 unit for every 8 g of carbs to 1 unit for every 7.5 g of carbs.  These changes start at 0700 and continue until midnight.  Patient states that [facility] was unable to fill her full prescription of 3 Dexcom G7 sensors as the only had 2 in stock, and now she is 1 sensor short for the month.  A sample Dexcom G7 sensor was provided in the office today.  Sample tandem auto soft XC 9 mm cannula infusion sets were provided to see if they make the infusion site less sensitive, as she currently uses a 6 mm cannula.  Assisted the patient with reconnecting her insulin pump to her following for remote download of insulin pump data.  Encouraged the patient to focus on diet and physical activity to assist with improvement in glycemic control and weight management.  Provided education on the impact that high doses of insulin have on the weight gain.  Patient will return home and take her blood pressure medication, and is encouraged to take it every morning as prescribed.  Patient is scheduled for a follow-up in 1 month, an A1c will be collected, and CGM/pump report reviewed.    The patient will monitor her blood glucose levels per CGM.  If she develops problematic hyperglycemia or hypoglycemia or adverse drug reactions, she will contact the office for further instructions.        Follow Up     Return in about 1 month (around 12/7/2024) for CGM Follow-Up, Insulin Pump Follow-Up, Medication Management.    Patient was given instructions and counseling regarding her condition or for health maintenance advice. Please see specific information pulled into the AVS if appropriate.     Walter Tate, APRN  11/07/2024      Dictated Utilizing Dragon Dictation.  Please note that portions of this note were completed with a voice recognition program.  Part  of this note may be an electronic transcription/translation of spoken language to printed text using the Dragon Dictation System.

## 2024-11-08 RX ORDER — LEVOTHYROXINE SODIUM 25 UG/1
25 TABLET ORAL
Qty: 30 TABLET | Refills: 1 | Status: SHIPPED | OUTPATIENT
Start: 2024-11-08

## 2024-11-08 RX ORDER — TOPIRAMATE 25 MG/1
TABLET, FILM COATED ORAL
Qty: 30 TABLET | OUTPATIENT
Start: 2024-11-08

## 2024-11-08 RX ORDER — CHLORTHALIDONE 25 MG/1
25 TABLET ORAL DAILY
Qty: 90 TABLET | Refills: 1 | Status: SHIPPED | OUTPATIENT
Start: 2024-11-08

## 2024-11-18 ENCOUNTER — OFFICE VISIT (OUTPATIENT)
Dept: BEHAVIORAL HEALTH | Facility: CLINIC | Age: 19
End: 2024-11-18
Payer: COMMERCIAL

## 2024-11-18 VITALS
SYSTOLIC BLOOD PRESSURE: 133 MMHG | HEART RATE: 103 BPM | DIASTOLIC BLOOD PRESSURE: 90 MMHG | BODY MASS INDEX: 37.73 KG/M2 | WEIGHT: 205 LBS | HEIGHT: 62 IN

## 2024-11-18 DIAGNOSIS — F41.1 GENERALIZED ANXIETY DISORDER: Primary | ICD-10-CM

## 2024-11-18 DIAGNOSIS — F31.81 BIPOLAR II DISORDER: ICD-10-CM

## 2024-11-18 DIAGNOSIS — F60.89 CLUSTER B PERSONALITY DISORDER: ICD-10-CM

## 2024-11-18 DIAGNOSIS — F33.2 SEVERE EPISODE OF RECURRENT MAJOR DEPRESSIVE DISORDER, WITHOUT PSYCHOTIC FEATURES: ICD-10-CM

## 2024-11-18 RX ORDER — INSULIN GLARGINE 100 [IU]/ML
INJECTION, SOLUTION SUBCUTANEOUS
COMMUNITY
Start: 2024-06-07

## 2024-11-18 NOTE — PROGRESS NOTES
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 outpatient program  Follow-up 1 month  Medication Education:   LATUDA (LURASIDONE) Take with food. Risks, benefits, and alternatives discussed with patient including akathisia, sedation, dizziness/falls risk, nausea, low risk of weight gain & metabolic risks for diabetes and dyslipidemia, and rare tardive dyskinesia.  After discussion of these risks and benefits, the patient voiced understanding and agreed to proceed. Instructed to take medication with meal of minimum of 350 calories to improve consistent efficacy and absorption.   ZOLOFT (SERTRALINE) Risks, benefits, alternatives discussed with patient including GI upset, nausea vomiting diarrhea, theoretical decrease of seizure threshold predisposing the patient to a slightly higher seizure risk, headaches, sexual dysfunction, serotonin syndrome, bleeding risk.  After discussion of these risks and benefits, the patient voiced understanding and agreed to proceed.  PRISTIQ (DESVENLAFAXINE) Risks, benefits, alternatives discussed with patient including anorexia, constipation, dizziness, dry mouth, nausea, nervousness, somnolence, sweating, sexual side effects, headache and insomnia. After discussion of these risks and benefits, the patient voiced understanding and agreed to proceed.   LAMICTAL (LAMOTRIGINE)  Risks, benefits, alternatives discussed with patient including rash, rebound depressive or manic symptoms if prompt discontinuation, GI upset, agitation, sedation/falls risk.  After discussion of these risks and benefits, patient voiced understanding and agreed to proceed.  Medications:   New Medications Ordered This Visit   Medications    Desvenlafaxine Succinate ER (Pristiq) 25 MG tablet sustained-release 24 hour     Sig: Take 1 tablet by mouth Daily. Take one tablet by mouth daily for 7 days, then increase to two tablets by mouth daily.     Dispense:  53 tablet     Refill:  1      ROSE reviewed.   Discussed medication options and treatment plan of prescribed medication as well as the risks, benefits, and side effects.  Patient is agreeable to call the office with any worsening of symptoms or onset of side effects.   Patient is agreeable to call 911 or go to the nearest ER should he/she begin having SI/HI.   Patient acknowledged, is agreeable to continue with current treatment plan, and was educated on the importance of compliance with treatment and follow-up appointments.  Addressed all questions and concerns.     Psychotherapy:      Psychotherapy time 40 minutes.  This time is exclusive to the therapy session and separate from the time spent on activities used to meet the criteria for the E/M service (history, exam, medical decision-making).  Goal is to strengthen defenses, promote problems solving, restore adaptive functioning, and provide symptom relief. Esteem building was enhanced through praise, reassurance, normalizing and encouragement. Coping skills were enhanced to build distress tolerance skills and emotional regulation. Allowed patient to freely discuss issues without interruption or judgement with unconditional positive regard, active listening skills, and empathy. Provided a safe, confidential environment to facilitate the development of a positive therapeutic relationship and encourage open,  honest communication. Assisted patient in processing session content, acknowledged and normalized patient’s thoughts, feelings, and concerns by utilizing a person-centered approach in efforts to build appropriate rapport and a positive therapeutic relationship with open and honest communication. Plan to continue supportive psychotherapy in next appointment to provide symptom relief.    Functional status: Serious symptoms OR any serious impairment in social, occupational, or school functioning (41-50)  Prognosis: Fair with expectation for some response to treatment  Progress: waxing and waning but worse overall      Follow-up: Return in about 2 months (around 1/18/2025).     This document has been electronically signed by JOSE Rodriguez  November 21, 2024 14:54 EST  Please note that portions of this note were completed with a voice recognition program.  Copied text in this note has been reviewed and is accurate as of 11/21/24

## 2024-11-21 ENCOUNTER — TELEPHONE (OUTPATIENT)
Dept: PSYCHIATRY | Facility: CLINIC | Age: 19
End: 2024-11-21
Payer: COMMERCIAL

## 2024-11-21 RX ORDER — DESVENLAFAXINE 25 MG/1
25 TABLET, EXTENDED RELEASE ORAL DAILY
Qty: 53 TABLET | Refills: 1 | Status: SHIPPED | OUTPATIENT
Start: 2024-11-21

## 2024-11-21 NOTE — TELEPHONE ENCOUNTER
PT(PATIENT) VERIFIED     PT(PATIENT) STATES SHE WAS SUPPOSED TO DC ZOLOFT AND START A NEW MEDICATION, BUT SHE CAN'T REMEMBER THE NAME     PER MOST RECENT OFFICE VISIT(UNSIGNED)  Office Visit with Valencia Golden APRN (2024)   Continue latuda to 40 mg at bedtime  Discontinue zoloft, decrease to 50 mg daily for 7 days, then stop   Continue lamictal to 50 mg daily  Continue [facility]    NO NEW MEDICATION MENTIONED IN THERAPY PLAN, BUT PT(PATIENT) ADVISED NOTE HASN'T BEEN SIGNED YET BY PROVIDER     PLEASE ADVISE

## 2024-11-21 NOTE — TELEPHONE ENCOUNTER
Progress note referred to pristiq in medication education. Plan was to discontinue zoloft and start pristiq (desvenlafaxine) 25 mg daily. Medication sent to patient's preferred pharmacy in record.

## 2024-11-21 NOTE — PATIENT INSTRUCTIONS
1.  Please return to clinic at your next scheduled visit.  Please contact the clinic ([phone]) at least 24 hours prior in the event you need to cancel.  2.  Do no harm to yourself or others.    3.  Avoid alcohol and drugs.    4.  Take all medications as prescribed.  Please contact the clinic with any concerns. If you are in need of medication refills, please call the clinic at [phone].    5. Should you want to get in touch with your provider, JOSE Rodriguez, please contact the office ([phone]), and staff will be able to page Valencia directly.  6. In the event you have personal crisis, contact the following crisis numbers: Suicide Prevention Hotline 1-279.246.5849; [facility] Helpline 9-357-374-DBYV; [facility] [phone]; text HELLO to 267421; or 905.     SPECIFIC RECOMMENDATIONS:     1.      Medications discussed at this encounter:     New Medications Ordered This Visit   Medications    Desvenlafaxine Succinate ER (Pristiq) 25 MG tablet sustained-release 24 hour     Sig: Take 1 tablet by mouth Daily. Take one tablet by mouth daily for 7 days, then increase to two tablets by mouth daily.     Dispense:  53 tablet     Refill:  1                       2.      Psychotherapy recommendations: We will continue therapy at future visits.     3.     Return to clinic: Return in about 2 months (around 1/18/2025).

## 2024-12-05 ENCOUNTER — TELEPHONE (OUTPATIENT)
Dept: PSYCHIATRY | Facility: CLINIC | Age: 19
End: 2024-12-05
Payer: COMMERCIAL

## 2024-12-05 NOTE — TELEPHONE ENCOUNTER
Unfortunately, related to HIPAA we are not at liberty to discuss this any further with patient's friend.  If patient was evaluated by qualified mental health professionals and it was deemed that she required inpatient psychiatric admission, then she will likely need to stay there until she is cleared by a psychiatrist for discharge.  Patient has been in intensive outpatient program for several weeks, so if she was having progressively worsening symptoms or expressing suicidal ideation this was likely the best option for her at the present time.  Considering patient safety above all else.

## 2024-12-05 NOTE — TELEPHONE ENCOUNTER
PTS FRIEND PHONED IN TO GIVE MESSAGE.    MESSAGE ONLY TAKEN AND NOT ON JERAMIE.    HE REPORTS THAT PT IS IN [facility].    PTS FRIEND STATES THAT PT WAS ASKED A QUESTION DURING HER OUTPATIENT THERAPY THRU THEM AND BASED ON PTS ANSWER THEY TOOK HER AWAY AND PUT HER IN THERE.    THEY STATE THAT PT SHOULDN'T BE IN THERE; THAT PT IS NOT A HARM TO HERSELF.    PTS  IS IN [place] DUE TO  AND CAN'T HELP.     PT ASKED FRIEND TO CALL PROVIDER CHRISTOPHER TO INFORM HER OF THIS AND WANTS ASSISTANCE TO GET OUT OF [facility].    PT WANTS TO SPEAK TO PROVIDER DIRECTLY BUT DOESN'T HAVE ACCESS TO HER PHONE.

## 2024-12-09 ENCOUNTER — TELEPHONE (OUTPATIENT)
Dept: DIABETES SERVICES | Facility: HOSPITAL | Age: 19
End: 2024-12-09

## 2024-12-09 NOTE — TELEPHONE ENCOUNTER
Patient states she was discharged from [facility] and insulin pump  will not turn on.  I explained how to turn it on.  She said it is messed up.  She has appointment Wednesday with Rasheed GARCIA. I explained she could come in tomorrow or wait until her appointment Wednesday.  She said she wait until Wednesday.

## 2024-12-09 NOTE — TELEPHONE ENCOUNTER
Caller: Amarilis Vinson    Relationship to patient: Self    Best call back number: [phone]     Patient is needing: PT INSULIN PUMP MALFUNCTIONING,WOULD LIKE TO SCHEDULE VISIT W EDUCATION TO HELP TROUBLESHOOT, PLEASE CALL TO SCHEDULE.

## 2024-12-10 ENCOUNTER — TELEPHONE (OUTPATIENT)
Dept: DIABETES SERVICES | Facility: HOSPITAL | Age: 19
End: 2024-12-10

## 2024-12-10 NOTE — TELEPHONE ENCOUNTER
Message left on voice mail requesting patient last office note to be sent to [facility].      Called office to verify and speak to Daly who left the message.   They do not have a patient by that name and are unsure who called.     No office notes were sent

## 2024-12-10 NOTE — PROGRESS NOTES
"Chief Complaint  No chief complaint on file.    Referred By: JOSE Mcadams presents to [facility] for insulin pump management    History of Present Illness    Visit type:  follow-up  Diabetes type:  Type 2  Current diabetes status/concerns/issues:  Recently discharged from [facility]  Other health concerns: No new health concerns  Current Diabetes symptoms:    Polyuria: Yes     Polydipsia: Yes     Polyphagia: No   Blurred vision: Yes     Excessive fatigueYes    Known Diabetes complications:  Neuropathy: Numbness, Tingling, and Pain; Location: Feet, Hands, and Bilateral  Renal: No current urine microalbumin available and Normal eGFR per current labs  Eyes: No Retinopathy reported on current eye exam; Location: Bilateral  Amputation/Wounds: None  GI: Nausea  Cardiovascular: Hypertension, Hyperlipidemia, Hypercholesterolemia, and Hypertriglyceridemia  ED: N/A  Other: None  Hypoglycemia:  Level 1 hypoglycemia (54 mg/dL - 70 mg/dL); Frequency - 0.1% and Level 2 (less than 54 mg/dL); Frequency - 0.5%  Hypoglycemia Symptoms:  confusion, shaking/tremors, sweating, and lightheadedness  Current diabetes treatment: Humalog U200 via Tandem t:slim X2 insulin pump  Blood glucose device:  Dexcom CGM0.1  Blood glucose monitoring frequency:  Continuous per CGM  Blood glucose range/average:  The 14-day sensor report shows an average glucose of 164 mg/dL, with 62% in target range ( mgdL), 32% in the high range (181-250 mg/dL), 5.8% in the very high range (>250 mg/dL), 0.1% in the low range (54-70 mg/dL) and 0.5% in the very low range (<54 mg/dL).   Glucose Source: Device Reviewed  Diet:  \"Eat what I want\"/No diet plan, Number of meals each day - 2-3; Number of snacks each day - 1-2  Activity/Exercise:   Active with work    Past Medical History:   Diagnosis Date    Anxiety     COVID 08/07/2022    NO CURRENT SYMPTOMS    Diabetes " mellitus     Foot pain, left     Hypertension     Migraines      Past Surgical History:   Procedure Laterality Date    FOOT SURGERY      DISTAL LEFT FIRST METATARSAL    HARDWARE REMOVAL Left 8/26/2022    Procedure: HARDWARE REMOVAL;  left first metatarsal shaft;  Surgeon: Michel Jaffe DPM;  Location: [facility] OR;  Service: Podiatry;  Laterality: Left;    ULNAR OSTEOPLASTY  2018     Family History   Adopted: Yes   Problem Relation Age of Onset    No Known Problems Mother     No Known Problems Father      Social History     Socioeconomic History    Marital status:    Tobacco Use    Smoking status: Never     Passive exposure: Never    Smokeless tobacco: Never   Vaping Use    Vaping status: Never Used   Substance and Sexual Activity    Alcohol use: Not Currently    Drug use: Not Currently    Sexual activity: Yes     Allergies   Allergen Reactions    Novolog [Insulin Aspart (Human Analog)] Hives       Current Outpatient Medications:     Alcohol Swabs pads, Use 1 Pad Daily., Disp: 100 each, Rfl: 1    aspirin-acetaminophen-caffeine (EXCEDRIN MIGRAINE) 250-250-65 MG per tablet, Take  by mouth., Disp: , Rfl:     Atogepant 60 MG tablet, Take 1 tablet by mouth Daily., Disp: 30 tablet, Rfl: 1    atorvastatin (LIPITOR) 20 MG tablet, Take 1 tablet by mouth Every Night., Disp: 90 tablet, Rfl: 1    BD Pen Needle Amberly 2nd Gen 32G X 4 MM misc, Use 1 each 5 (Five) Times a Day. use as directed, Disp: 200 each, Rfl: 3    chlorthalidone (HYGROTON) 25 MG tablet, TAKE 1 TABLET BY MOUTH DAILY, Disp: 90 tablet, Rfl: 1    Continuous Glucose Sensor (Dexcom G7 Sensor) misc, Use 1 each Every 10 (Ten) Days for 180 days., Disp: 3 each, Rfl: 5    dapagliflozin Propanediol (Farxiga) 10 MG tablet, Take 10 mg by mouth Daily., Disp: 90 tablet, Rfl: 3    Desvenlafaxine Succinate ER (Pristiq) 25 MG tablet sustained-release 24 hour, Take 1 tablet by mouth Daily. Take one tablet by mouth daily for 7 days, then increase to two tablets  "by mouth daily., Disp: 53 tablet, Rfl: 1    glucose blood (OneTouch Verio) test strip, Use as instructed for verification and calibration of DEXCOM G7 up to three times daily., Disp: 100 each, Rfl: 11    glucose monitor monitoring kit, Use 1 each Daily., Disp: 1 each, Rfl: 0    Insulin Glargine (Lantus SoloStar) 100 UNIT/ML injection pen, , Disp: , Rfl:     Insulin Lispro-aabc (Lyumjev KwikPen) 200 UNIT/ML solution pen-injector, Inject 80 Units under the skin into the appropriate area as directed 3 (Three) Times a Day Before Meals for 180 days., Disp: 110 mL, Rfl: 1    Insulin Syringe 31G X 5/16\" 1 ML misc, Use 1 syringe Daily., Disp: 100 each, Rfl: 1    lamoTRIgine (LaMICtal) 100 MG tablet, Take 1 tablet by mouth Daily., Disp: 30 tablet, Rfl: 1    Lancets (freestyle) lancets, Use 3 times per day as needed to check blood sugars, Disp: 100 each, Rfl: 4    levothyroxine (SYNTHROID, LEVOTHROID) 25 MCG tablet, TAKE 1 TABLET BY MOUTH EVERY MORNING, Disp: 30 tablet, Rfl: 1    lisinopril (PRINIVIL,ZESTRIL) 40 MG tablet, TAKE 1 TABLET BY MOUTH DAILY, Disp: 90 tablet, Rfl: 1    lurasidone (LATUDA) 40 MG tablet tablet, TAKE 1 TABLET BY MOUTH DAILY, Disp: 30 tablet, Rfl: 1    meclizine (ANTIVERT) 25 MG tablet, Take 1 tablet by mouth 3 (Three) Times a Day As Needed for Dizziness., Disp: 10 tablet, Rfl: 0    Norethin-Eth Estrad-Fe Biphas (Lo Loestrin Fe) 1 MG-10 MCG / 10 MCG tablet, Take 1 tablet by mouth Daily., Disp: 28 tablet, Rfl: 3    omeprazole (priLOSEC) 20 MG capsule, Take 1 capsule by mouth Daily., Disp: 90 capsule, Rfl: 1    ondansetron ODT (ZOFRAN-ODT) 4 MG disintegrating tablet, Place 1 tablet on the tongue Every 4 (Four) Hours As Needed for Nausea or Vomiting., Disp: 15 tablet, Rfl: 0    pioglitazone (Actos) 30 MG tablet, Take 1 tablet by mouth Daily for 360 days., Disp: 90 tablet, Rfl: 3    topiramate (TOPAMAX) 100 MG tablet, Take 1 tablet by mouth Daily. Please take the 25 mg tablet for two weeks in addition to " "your 50 mg topiramate tablet for total of 75 mg daily, then STOP the 50 mg and 25 mg tablets and take 1 100 mg tablet daily, Disp: 30 tablet, Rfl: 1    topiramate (Topamax) 25 MG tablet, Please take the 25 mg tablet for two weeks in addition to your 50 mg topiramate tablet for total of 75 mg daily, then STOP the 50 mg and 25 mg tablets and take 1 100 mg tablet daily, Disp: 14 tablet, Rfl: 0    Objective     Vitals:    12/11/24 1045   BP: (!) 140/111   BP Location: Left arm   Patient Position: Sitting   Cuff Size: Large Adult   Pulse: 80   Temp: 99.3 °F (37.4 °C)   TempSrc: Infrared   SpO2: 99%   Weight: 92.4 kg (203 lb 12.8 oz)   Height: 157.5 cm (62.01\")     Body mass index is 37.27 kg/m².    Physical Exam  Constitutional:       Appearance: Normal appearance. She is obese.      Comments: Severe Obesity with Comorbidity ( BMI 35 - 39.9) Pt Current BMI = 37.27 with comorbidities of hypertension, hyperlipidemia, and diabetes     HENT:      Head: Normocephalic and atraumatic.      Right Ear: External ear normal.      Left Ear: External ear normal.      Nose: Nose normal.   Eyes:      Extraocular Movements: Extraocular movements intact.      Conjunctiva/sclera: Conjunctivae normal.   Pulmonary:      Effort: Pulmonary effort is normal.   Musculoskeletal:         General: Normal range of motion.      Cervical back: Normal range of motion.   Skin:     General: Skin is warm and dry.   Neurological:      General: No focal deficit present.      Mental Status: She is alert and oriented to person, place, and time. Mental status is at baseline.   Psychiatric:         Mood and Affect: Mood normal.         Behavior: Behavior normal.         Thought Content: Thought content normal.         Judgment: Judgment normal.         Result Review :   The following data was reviewed by: JOSE Bliss on 12/09/2024:    Most Recent A1C          12/11/2024    10:52   HGBA1C Most Recent   Hemoglobin A1C 8.5        A1C Last 3 Results  "         8/14/2024    10:41 10/7/2024    13:32 12/11/2024    10:52   HGBA1C Last 3 Results   Hemoglobin A1C 9.20  9.6  8.5      A1c collected in the office today is 8.5%, indicating Uncontrolled Type II diabetes.  This result is down from the prior result of 9.6% collected on 10/7/2024    Glucose   Date Value Ref Range Status   12/11/2024 122 (H) 70 - 99 mg/dL Final     Comment:     Serial Number: [id number]:  [id number]     Point of care glucose in the office today is within normal limits for nonfasting glucose    Creatinine   Date Value Ref Range Status   10/25/2024 0.84 0.57 - 1.00 mg/dL Final   09/25/2024 0.67 0.57 - 1.00 mg/dL Final     eGFR   Date Value Ref Range Status   10/25/2024 101.5 >60.0 mL/min/1.73 Final   09/25/2024 127.7 >60.0 mL/min/1.73 Final     Labs collected on 10/25/2024 show Normal values    Microalbumin, Urine   Date Value Ref Range Status   02/06/2024 1.4 mg/dL Final   12/09/2022 2.4 mg/dL Final     Total Cholesterol   Date Value Ref Range Status   08/14/2024 235 (H) 0 - 200 mg/dL Final   05/14/2024 182 0 - 200 mg/dL Final   05/06/2019 128 <=199 mg/dL Final     Comment:     Age Range       Desirable       Borderline High       High Risk  Child/Adol.    <170 mg/dL      170-199 mg/dL         >=200 mg/dL  Adult          <200 mg/dL      200-239 mg/dL         >=240 mg/dL   07/18/2018 130 <=199 mg/dL Final     Comment:     Age Range       Desirable       Borderline High       High Risk  Child/Adol.    <170 mg/dL      170-199 mg/dL         >=200 mg/dL  Adult          <200 mg/dL      200-239 mg/dL         >=240 mg/dL     Triglycerides   Date Value Ref Range Status   08/14/2024 480 (H) 0 - 150 mg/dL Final   05/14/2024 373 (H) 0 - 150 mg/dL Final   05/06/2019 124 <=129 mg/dL Final     Comment:     Age Range      Acceptable     Borderline High    High           Very High  Child(2-9Yrs)  <75 mg/dL      75-99 mg/dL       >=100 mg/dL  Adolescent  10-18 Yrs      <90 mg/dL       mg/dL      >=130  mg/dL  Adult  >18 Yrs        <150 mg/dL     150-199 mg/dL     200-499 mg/dL    >=500mg/dL   07/18/2018 84 <=129 mg/dL Final     Comment:     Age Range      Acceptable     Borderline High    High           Very High  Child(2-9Yrs)  <75 mg/dL      75-99 mg/dL       >=100 mg/dL  Adolescent  10-18 Yrs      <90 mg/dL       mg/dL      >=130 mg/dL  Adult  >18 Yrs        <150 mg/dL     150-199 mg/dL     200-499 mg/dL    >=500mg/dL     HDL Cholesterol   Date Value Ref Range Status   08/14/2024 37 (L) 40 - 60 mg/dL Final   05/14/2024 30 (L) 40 - 60 mg/dL Final     LDL Cholesterol    Date Value Ref Range Status   08/14/2024 114 (H) 0 - 100 mg/dL Final   05/14/2024 90 0 - 100 mg/dL Final     LDL Chol Calc (NIH)   Date Value Ref Range Status   05/06/2019 75 <=129 mg/dL Final     Comment:     Age Range         Acceptable      Borderline High       High  Child/Adol.      <110 mg/dL       110-129 mg/dL        >=130 mg/dL  Adult            <100 mg/dL       100-159 mg/dL        >=160mg/dL   07/18/2018 76 <=129 mg/dL Final     Comment:     Age Range         Acceptable      Borderline High       High  Child/Adol.      <110 mg/dL       110-129 mg/dL        >=130 mg/dL  Adult            <100 mg/dL       100-159 mg/dL        >=160mg/dL     Lipid panel collected on 8/14/2024 shows Hyperlipidemia, Hypercholesterolemia, Hypertriglyceridemia, and low HDL           Assessment: Patient presents for 6-week follow-up.  Patient is managing the insulin pump without difficulty and her A1c has improved.  Patient's current A1c is 8.5% which is down from her previous result of 9.6% collected on 10/7/2024.  Patient's CGM/insulin pump report was reviewed and shows an average glucose of 164 mg/dL, with 62% in target range ( mgdL), 32% in the high range (181-250 mg/dL), 5.8% in the very high range (>250 mg/dL), 0.1% in the low range (54-70 mg/dL) and 0.5% in the very low range (<54 mg/dL).  Patient has had a significant improvement in her  average blood glucose and time in range from her last pump report.  Patient is hypertensive in the office today with a blood pressure of 140/111, and the patient states that she was just discharged from [facility] and her medication was changed while she was there.  Patient with a current BMI of 37.27 kg/m².      Diagnoses and all orders for this visit:    1. Type 2 diabetes mellitus with hyperglycemia, with long-term current use of insulin (Primary)  -     POC Glycosylated Hemoglobin (Hb A1C)    2. Type 2 diabetes mellitus with diabetic polyneuropathy, with long-term current use of insulin    3. Primary hypertension    4. Insulin pump in place    5. Class 2 severe obesity due to excess calories with serious comorbidity and body mass index (BMI) of 37.0 to 37.9 in adult    6. Uses self-applied continuous glucose monitoring device    Other orders  -     POC Glucose        Plan: No changes were made already but did not to the patient's plan of care.  Patient is encouraged continue to administer her boluses for meals 10 to 15 minutes prior to each meal and enter her carbs as accurately as possible.  No changes were made to the insulin pump settings.  Patient is encouraged continue focus on her diet and physical activity to assist with improvement in glycemic control and weight management.  Patient is scheduled for follow-up in 2 months, an A1c will be collected, and CGM report reviewed.    The patient will monitor her blood glucose levels per CGM.  If she develops problematic hyperglycemia or hypoglycemia or adverse drug reactions, she will contact the office for further instructions.        Follow Up     Return in about 2 months (around 2/11/2025) for CGM Follow-Up, Insulin Pump Follow-Up, Medication Management.    Patient was given instructions and counseling regarding her condition or for health maintenance advice. Please see specific information pulled into the AVS if appropriate.     Walter URENA  JOSE Tate  12/11/2024      Dictated Utilizing Dragon Dictation.  Please note that portions of this note were completed with a voice recognition program.  Part of this note may be an electronic transcription/translation of spoken language to printed text using the Dragon Dictation System.

## 2024-12-11 ENCOUNTER — OFFICE VISIT (OUTPATIENT)
Dept: DIABETES SERVICES | Facility: HOSPITAL | Age: 19
End: 2024-12-11
Payer: COMMERCIAL

## 2024-12-11 VITALS
HEART RATE: 80 BPM | OXYGEN SATURATION: 99 % | TEMPERATURE: 99.3 F | DIASTOLIC BLOOD PRESSURE: 111 MMHG | WEIGHT: 203.8 LBS | SYSTOLIC BLOOD PRESSURE: 140 MMHG | HEIGHT: 62 IN | BODY MASS INDEX: 37.5 KG/M2

## 2024-12-11 DIAGNOSIS — I10 PRIMARY HYPERTENSION: ICD-10-CM

## 2024-12-11 DIAGNOSIS — E66.01 CLASS 2 SEVERE OBESITY DUE TO EXCESS CALORIES WITH SERIOUS COMORBIDITY AND BODY MASS INDEX (BMI) OF 37.0 TO 37.9 IN ADULT: ICD-10-CM

## 2024-12-11 DIAGNOSIS — Z96.41 INSULIN PUMP IN PLACE: ICD-10-CM

## 2024-12-11 DIAGNOSIS — Z79.4 TYPE 2 DIABETES MELLITUS WITH DIABETIC POLYNEUROPATHY, WITH LONG-TERM CURRENT USE OF INSULIN: ICD-10-CM

## 2024-12-11 DIAGNOSIS — E66.812 CLASS 2 SEVERE OBESITY DUE TO EXCESS CALORIES WITH SERIOUS COMORBIDITY AND BODY MASS INDEX (BMI) OF 37.0 TO 37.9 IN ADULT: ICD-10-CM

## 2024-12-11 DIAGNOSIS — Z79.4 TYPE 2 DIABETES MELLITUS WITH HYPERGLYCEMIA, WITH LONG-TERM CURRENT USE OF INSULIN: Primary | ICD-10-CM

## 2024-12-11 DIAGNOSIS — E11.42 TYPE 2 DIABETES MELLITUS WITH DIABETIC POLYNEUROPATHY, WITH LONG-TERM CURRENT USE OF INSULIN: ICD-10-CM

## 2024-12-11 DIAGNOSIS — Z97.8 USES SELF-APPLIED CONTINUOUS GLUCOSE MONITORING DEVICE: ICD-10-CM

## 2024-12-11 DIAGNOSIS — E11.65 TYPE 2 DIABETES MELLITUS WITH HYPERGLYCEMIA, WITH LONG-TERM CURRENT USE OF INSULIN: Primary | ICD-10-CM

## 2024-12-11 LAB
EXPIRATION DATE: ABNORMAL
GLUCOSE BLDC GLUCOMTR-MCNC: 122 MG/DL (ref 70–99)
HBA1C MFR BLD: 8.5 % (ref 4.5–5.7)
Lab: ABNORMAL

## 2024-12-11 PROCEDURE — 82948 REAGENT STRIP/BLOOD GLUCOSE: CPT

## 2024-12-11 PROCEDURE — G0463 HOSPITAL OUTPT CLINIC VISIT: HCPCS

## 2024-12-11 PROCEDURE — 83036 HEMOGLOBIN GLYCOSYLATED A1C: CPT

## 2024-12-23 ENCOUNTER — TELEPHONE (OUTPATIENT)
Dept: PSYCHIATRY | Facility: CLINIC | Age: 19
End: 2024-12-23
Payer: COMMERCIAL

## 2024-12-23 RX ORDER — ACYCLOVIR 400 MG/1
1 TABLET ORAL TAKE AS DIRECTED
Qty: 1 EACH | Refills: 2 | Status: CANCELLED | OUTPATIENT
Start: 2024-12-23

## 2024-12-23 NOTE — TELEPHONE ENCOUNTER
PT CALLED STATES HER JOB NEEDS NOTE FOR 12/04/24 THROUGH 1209/24 AS SHE WAS ADMITTED IN [facility] SO SHE CAN KEEP HER JOB. PT STATES SHE IS SUPPOSED TO GET AN APPOINTMENT WITH JOAN IT JUST HAS NOT BEEN SCHEDULED YET. PT WANTING O SEE IF [facility] CAN PUT HER OFF UNTIL THAT APPOINTMENT OR 6 WEEKS.

## 2024-12-23 NOTE — TELEPHONE ENCOUNTER
This will need to be addressed by Valencia when she returns. I'll forward to her supervisor for his information as well.

## 2024-12-26 NOTE — TELEPHONE ENCOUNTER
Please schedule patient for 1/27/2025 @ 0261 & 0420 [employer] [facility] virtual visit. I phoned the patient and spoke with them at length.  Patient reports that she was placed on a 72-hour hold at [facility] earlier this month.  She reports that this admission, she felt, was nonproductive.  She experienced challenges with medication management.  She has been discharged to follow up for further intensive outpatient programming and individual psychotherapy possibly with [facility] therapist.  Allowed patient to discuss her inpatient psychiatric admission experience.  I provided reassurance and reflective listening.  I listened to personal concerns.  I allowed them to verbally release frustration and then explained these feelings.  I expressed empathy.  Patient has agreed and contracted for safety with me and currently denies suicidal ideation, planning, or intent to self-harm. We are going to schedule a follow-up and will provide a letter for approximately 4-6 weeks continued leave of absence from work. Patient is aware that she will need to obtain letter from [facility] to cover the dates of her inpatient admission.   Patient is agreeable to this plan.  Patient is also agreeable to call the office with any worsening of symptoms.

## 2024-12-30 ENCOUNTER — TELEPHONE (OUTPATIENT)
Dept: BEHAVIORAL HEALTH | Facility: CLINIC | Age: 19
End: 2024-12-30
Payer: COMMERCIAL

## 2024-12-30 NOTE — TELEPHONE ENCOUNTER
SPOKE TO PATIENT AND INFORMED HER THAT HER LETTER IS READY FOR  AND SHE SAID SHE WOULD BE HERE BEFORE 4 TODAY TO PICK IT UP.

## 2024-12-30 NOTE — TELEPHONE ENCOUNTER
Letter completed and signed. Please phone patient to inform her that letter is available for  at [facility] office.

## 2025-01-08 ENCOUNTER — OFFICE VISIT (OUTPATIENT)
Dept: DIABETES SERVICES | Facility: HOSPITAL | Age: 20
End: 2025-01-08
Payer: COMMERCIAL

## 2025-01-08 ENCOUNTER — TELEPHONE (OUTPATIENT)
Dept: DIABETES SERVICES | Facility: HOSPITAL | Age: 20
End: 2025-01-08
Payer: COMMERCIAL

## 2025-01-08 VITALS
DIASTOLIC BLOOD PRESSURE: 102 MMHG | BODY MASS INDEX: 38.63 KG/M2 | HEART RATE: 87 BPM | HEIGHT: 62 IN | OXYGEN SATURATION: 97 % | SYSTOLIC BLOOD PRESSURE: 174 MMHG | WEIGHT: 209.9 LBS

## 2025-01-08 DIAGNOSIS — Z96.41 INSULIN PUMP IN PLACE: ICD-10-CM

## 2025-01-08 DIAGNOSIS — Z79.4 TYPE 2 DIABETES MELLITUS WITH HYPERGLYCEMIA, WITH LONG-TERM CURRENT USE OF INSULIN: Primary | ICD-10-CM

## 2025-01-08 DIAGNOSIS — E66.01 CLASS 2 SEVERE OBESITY DUE TO EXCESS CALORIES WITH SERIOUS COMORBIDITY AND BODY MASS INDEX (BMI) OF 38.0 TO 38.9 IN ADULT: ICD-10-CM

## 2025-01-08 DIAGNOSIS — E11.65 TYPE 2 DIABETES MELLITUS WITH HYPERGLYCEMIA, WITH LONG-TERM CURRENT USE OF INSULIN: Primary | ICD-10-CM

## 2025-01-08 DIAGNOSIS — I10 PRIMARY HYPERTENSION: ICD-10-CM

## 2025-01-08 DIAGNOSIS — E66.812 CLASS 2 SEVERE OBESITY DUE TO EXCESS CALORIES WITH SERIOUS COMORBIDITY AND BODY MASS INDEX (BMI) OF 38.0 TO 38.9 IN ADULT: ICD-10-CM

## 2025-01-08 DIAGNOSIS — E11.42 TYPE 2 DIABETES MELLITUS WITH DIABETIC POLYNEUROPATHY, WITH LONG-TERM CURRENT USE OF INSULIN: ICD-10-CM

## 2025-01-08 DIAGNOSIS — Z97.8 USES SELF-APPLIED CONTINUOUS GLUCOSE MONITORING DEVICE: ICD-10-CM

## 2025-01-08 DIAGNOSIS — Z79.4 TYPE 2 DIABETES MELLITUS WITH DIABETIC POLYNEUROPATHY, WITH LONG-TERM CURRENT USE OF INSULIN: ICD-10-CM

## 2025-01-08 LAB
EXPIRATION DATE: ABNORMAL
GLUCOSE BLDC GLUCOMTR-MCNC: 207 MG/DL (ref 70–99)
HBA1C MFR BLD: 8.8 % (ref 4.5–5.7)
Lab: ABNORMAL

## 2025-01-08 PROCEDURE — 83036 HEMOGLOBIN GLYCOSYLATED A1C: CPT

## 2025-01-08 PROCEDURE — 82948 REAGENT STRIP/BLOOD GLUCOSE: CPT

## 2025-01-08 PROCEDURE — 95251 CONT GLUC MNTR ANALYSIS I&R: CPT

## 2025-01-08 PROCEDURE — 99214 OFFICE O/P EST MOD 30 MIN: CPT

## 2025-01-08 PROCEDURE — G0463 HOSPITAL OUTPT CLINIC VISIT: HCPCS

## 2025-01-08 RX ORDER — BLOOD-GLUCOSE SENSOR
2 EACH MISCELLANEOUS
Qty: 2 EACH | Refills: 5 | Status: SHIPPED | OUTPATIENT
Start: 2025-01-08 | End: 2025-07-07

## 2025-01-08 NOTE — PROGRESS NOTES
Chief Complaint  Diabetes (Follow up, med mgt, A1c eval, CGM eval)    Referred By: JOSE Mcadams presents to [facility] for insulin pump management    History of Present Illness    History of Present Illness  The patient presents for evaluation of diabetes.    She has been experiencing consistent issues with her Dexcom sensor, which frequently fails prematurely. She recalls an instance where the sensor malfunctioned within a span of 3 hours. Despite attempts to seek assistance from [facility], no resolution was provided. She expresses a desire to transition to the Mckayla 2 Plus system and is curious about the availability of samples. In the interim, she plans to continue using the Dexcom system until the Mckayla 2 Plus is available. She also mentions that she typically calculates her carbohydrate intake prior to administering a bolus dose of insulin.  Pump report reveals that the patient has not been consistently bolusing prior to meals, has been dependent upon the pump control IQ software to administer correction boluses.  Patient states that after her recent admission for mental health she has been struggling to administer her boluses prior to each meal.    MEDICATIONS  Current: insulin      Visit type:  follow-up  Diabetes type:  Type 2  Current diabetes status/concerns/issues: Patient experienced difficulties with her Dexcom G7 sensors malfunctioning at day 5, with a sensor that failed immediately prior to today's visit  Other health concerns: No new health concerns  Current Diabetes symptoms:    Polyuria: Yes     Polydipsia: Yes     Polyphagia: No   Blurred vision: Yes     Excessive fatigue: Yes    Known Diabetes complications:  Neuropathy: Numbness, Tingling, and Pain; Location: Feet, Hands, and Bilateral  Renal: No current urine microalbumin available and Normal eGFR per current labs  Eyes: No Retinopathy reported on current eye exam;  "Location: Bilateral  Amputation/Wounds: None  GI: Nausea  Cardiovascular: Hypertension, Hyperlipidemia, Hypercholesterolemia, and Hypertriglyceridemia  ED: N/A  Other: None  Hypoglycemia:  None reported at this time  Hypoglycemia Symptoms:  No hypoglycemia at this time  Current diabetes treatment:  Humalog U200 via Tandem t:slim X2 insulin pump  Blood glucose device:  Dexcom CGM  Blood glucose monitoring frequency:  Continuous per CGM  Blood glucose range/average:  The 14-day sensor report shows an average glucose of 237 mg/dL, with 25% in target range ( mgdL), 32% in the high range (181-250 mg/dL), 42% in the very high range (>250 mg/dL), 0% in the low range (54-70 mg/dL) and 0% in the very low range (<54 mg/dL).   Glucose Source: Device Reviewed  Diet:  Carbohydrate Counting, \"Eat what I want\"/No diet plan, Number of meals each day - 2-3; Number of snacks each day - 1-2  Activity/Exercise:   Active with work    Past Medical History:   Diagnosis Date    Anxiety     COVID 08/07/2022    NO CURRENT SYMPTOMS    Diabetes mellitus     Foot pain, left     Hypertension     Migraines      Past Surgical History:   Procedure Laterality Date    FOOT SURGERY      DISTAL LEFT FIRST METATARSAL    HARDWARE REMOVAL Left 8/26/2022    Procedure: HARDWARE REMOVAL;  left first metatarsal shaft;  Surgeon: Michel Jaffe DPM;  Location: [facility];  Service: Podiatry;  Laterality: Left;    ULNAR OSTEOPLASTY  2018     Family History   Adopted: Yes   Problem Relation Age of Onset    No Known Problems Mother     No Known Problems Father      Social History     Socioeconomic History    Marital status:    Tobacco Use    Smoking status: Never     Passive exposure: Never    Smokeless tobacco: Never   Vaping Use    Vaping status: Never Used   Substance and Sexual Activity    Alcohol use: Not Currently    Drug use: Not Currently    Sexual activity: Yes     Allergies   Allergen Reactions    Novolog [Insulin Aspart (Human " "Analog)] Hives       Current Outpatient Medications:     Alcohol Swabs pads, Use 1 Pad Daily., Disp: 100 each, Rfl: 1    aspirin-acetaminophen-caffeine (EXCEDRIN MIGRAINE) 250-250-65 MG per tablet, Take  by mouth., Disp: , Rfl:     Atogepant 60 MG tablet, Take 1 tablet by mouth Daily., Disp: 30 tablet, Rfl: 1    atorvastatin (LIPITOR) 20 MG tablet, Take 1 tablet by mouth Every Night., Disp: 90 tablet, Rfl: 1    BD Pen Needle Amberly 2nd Gen 32G X 4 MM misc, Use 1 each 5 (Five) Times a Day. use as directed, Disp: 200 each, Rfl: 3    chlorthalidone (HYGROTON) 25 MG tablet, TAKE 1 TABLET BY MOUTH DAILY, Disp: 90 tablet, Rfl: 1    Continuous Glucose Sensor (Dexcom G7 Sensor) misc, Use 1 each Every 10 (Ten) Days for 180 days., Disp: 3 each, Rfl: 5    dapagliflozin Propanediol (Farxiga) 10 MG tablet, Take 10 mg by mouth Daily., Disp: 90 tablet, Rfl: 3    Desvenlafaxine Succinate ER (Pristiq) 25 MG tablet sustained-release 24 hour, Take 1 tablet by mouth Daily. Take one tablet by mouth daily for 7 days, then increase to two tablets by mouth daily., Disp: 53 tablet, Rfl: 1    glucose blood (OneTouch Verio) test strip, Use as instructed for verification and calibration of DEXCOM G7 up to three times daily., Disp: 100 each, Rfl: 11    glucose monitor monitoring kit, Use 1 each Daily., Disp: 1 each, Rfl: 0    Insulin Glargine (Lantus SoloStar) 100 UNIT/ML injection pen, , Disp: , Rfl:     Insulin Lispro-aabc (Lyumjev KwikPen) 200 UNIT/ML solution pen-injector, Inject 80 Units under the skin into the appropriate area as directed 3 (Three) Times a Day Before Meals for 180 days., Disp: 110 mL, Rfl: 1    Insulin Syringe 31G X 5/16\" 1 ML misc, Use 1 syringe Daily., Disp: 100 each, Rfl: 1    lamoTRIgine (LaMICtal) 100 MG tablet, Take 1 tablet by mouth Daily., Disp: 30 tablet, Rfl: 1    Lancets (freestyle) lancets, Use 3 times per day as needed to check blood sugars, Disp: 100 each, Rfl: 4    levothyroxine (SYNTHROID, LEVOTHROID) 25 MCG " "tablet, TAKE 1 TABLET BY MOUTH EVERY MORNING, Disp: 30 tablet, Rfl: 1    lisinopril (PRINIVIL,ZESTRIL) 40 MG tablet, TAKE 1 TABLET BY MOUTH DAILY, Disp: 90 tablet, Rfl: 1    lurasidone (LATUDA) 40 MG tablet tablet, TAKE 1 TABLET BY MOUTH DAILY, Disp: 30 tablet, Rfl: 1    meclizine (ANTIVERT) 25 MG tablet, Take 1 tablet by mouth 3 (Three) Times a Day As Needed for Dizziness., Disp: 10 tablet, Rfl: 0    Norethin-Eth Estrad-Fe Biphas (Lo Loestrin Fe) 1 MG-10 MCG / 10 MCG tablet, Take 1 tablet by mouth Daily., Disp: 28 tablet, Rfl: 3    omeprazole (priLOSEC) 20 MG capsule, Take 1 capsule by mouth Daily., Disp: 90 capsule, Rfl: 1    ondansetron ODT (ZOFRAN-ODT) 4 MG disintegrating tablet, Place 1 tablet on the tongue Every 4 (Four) Hours As Needed for Nausea or Vomiting., Disp: 15 tablet, Rfl: 0    pioglitazone (Actos) 30 MG tablet, Take 1 tablet by mouth Daily for 360 days., Disp: 90 tablet, Rfl: 3    topiramate (TOPAMAX) 100 MG tablet, Take 1 tablet by mouth Daily. Please take the 25 mg tablet for two weeks in addition to your 50 mg topiramate tablet for total of 75 mg daily, then STOP the 50 mg and 25 mg tablets and take 1 100 mg tablet daily, Disp: 30 tablet, Rfl: 1    topiramate (Topamax) 25 MG tablet, Please take the 25 mg tablet for two weeks in addition to your 50 mg topiramate tablet for total of 75 mg daily, then STOP the 50 mg and 25 mg tablets and take 1 100 mg tablet daily, Disp: 14 tablet, Rfl: 0    Continuous Glucose Sensor (FreeStyle Mckayla 2 Plus Sensor) misc, Use 2 each Every 30 (Thirty) Days for 180 days., Disp: 2 each, Rfl: 5    Objective     Vitals:    01/08/25 1612   BP: (!) 174/102   BP Location: Right arm   Patient Position: Sitting   Cuff Size: Large Adult   Pulse: 87   SpO2: 97%   Weight: 95.2 kg (209 lb 14.4 oz)   Height: 157.5 cm (62.01\")     Body mass index is 38.38 kg/m².    Physical Exam  Constitutional:       Appearance: Normal appearance. She is obese.      Comments: Severe Obesity with " Comorbidity ( BMI 35 - 39.9) Pt Current BMI = 38.38 with comorbidities of hypertension, hyperlipidemia, diabetes     HENT:      Head: Normocephalic and atraumatic.      Right Ear: External ear normal.      Left Ear: External ear normal.      Nose: Nose normal.   Eyes:      Extraocular Movements: Extraocular movements intact.      Conjunctiva/sclera: Conjunctivae normal.   Pulmonary:      Effort: Pulmonary effort is normal.   Musculoskeletal:         General: Normal range of motion.      Cervical back: Normal range of motion.   Skin:     General: Skin is warm and dry.   Neurological:      General: No focal deficit present.      Mental Status: She is alert and oriented to person, place, and time. Mental status is at baseline.   Psychiatric:         Mood and Affect: Mood normal.         Behavior: Behavior normal.         Thought Content: Thought content normal.         Judgment: Judgment normal.       Result Review :   The following data was reviewed by: JOSE Bliss on 01/08/2025:    Most Recent A1C          1/8/2025    16:23   HGBA1C Most Recent   Hemoglobin A1C 8.8        A1C Last 3 Results          10/7/2024    13:32 12/11/2024    10:52 1/8/2025    16:23   HGBA1C Last 3 Results   Hemoglobin A1C 9.6  8.5  8.8      A1c collected in the office today is 8.8%, indicating Uncontrolled Type II diabetes.  This result is up from the prior result of 8.5% collected on 12/11/2024     Glucose   Date Value Ref Range Status   01/08/2025 207 (H) 70 - 99 mg/dL Final     Comment:     Serial Number: [id number]:  [id number]     Point of care glucose in the office today is  blood sugar is elevated at 207 mg/dL    Creatinine   Date Value Ref Range Status   10/25/2024 0.84 0.57 - 1.00 mg/dL Final   09/25/2024 0.67 0.57 - 1.00 mg/dL Final     eGFR   Date Value Ref Range Status   10/25/2024 101.5 >60.0 mL/min/1.73 Final   09/25/2024 127.7 >60.0 mL/min/1.73 Final     Labs collected on 10/25/2024 show Normal  values    Microalbumin, Urine   Date Value Ref Range Status   02/06/2024 1.4 mg/dL Final   12/09/2022 2.4 mg/dL Final     Total Cholesterol   Date Value Ref Range Status   08/14/2024 235 (H) 0 - 200 mg/dL Final   05/14/2024 182 0 - 200 mg/dL Final     Triglycerides   Date Value Ref Range Status   08/14/2024 480 (H) 0 - 150 mg/dL Final   05/14/2024 373 (H) 0 - 150 mg/dL Final     HDL Cholesterol   Date Value Ref Range Status   08/14/2024 37 (L) 40 - 60 mg/dL Final   05/14/2024 30 (L) 40 - 60 mg/dL Final     LDL Cholesterol    Date Value Ref Range Status   08/14/2024 114 (H) 0 - 100 mg/dL Final   05/14/2024 90 0 - 100 mg/dL Final     Lipid panel collected on 8/14/2024 shows Hyperlipidemia, Hypercholesterolemia, Hypertriglyceridemia, and low HDL            Diagnoses and all orders for this visit:    1. Type 2 diabetes mellitus with hyperglycemia, with long-term current use of insulin (Primary)  -     POC Glucose  -     POC Glycosylated Hemoglobin (Hb A1C)  -     Continuous Glucose Sensor (FreeStyle Mckayla 2 Plus Sensor) misc; Use 2 each Every 30 (Thirty) Days for 180 days.  Dispense: 2 each; Refill: 5    2. Type 2 diabetes mellitus with diabetic polyneuropathy, with long-term current use of insulin    3. Primary hypertension    4. Insulin pump in place    5. Uses self-applied continuous glucose monitoring device    6. Class 2 severe obesity due to excess calories with serious comorbidity and body mass index (BMI) of 38.0 to 38.9 in adult        Assessment & Plan  1. Diabetes mellitus.  The patient's Dexcom sensor has been consistently failing around the fifth day of use. A transition to the Mckayla 2 Plus system is planned, as it is compatible with her current pump. A prescription for the Mckayla 2 Plus, with a 15-day sensor, has been issued and sent to [facility]. The patient will continue using the Dexcom G7 sensor for the time being, with the expectation that it will last for an additional 5 days. She is  advised to ensure bolusing for meals to help manage her blood glucose levels effectively.  Patient experienced a 6 pound weight loss since her last office visit with a current BMI of 38.38 kg/m².  Patient's blood pressure is markedly elevated in the office today at 174/102, but patient denies signs or symptoms of endorgan damage, and states that this is her normal range.    Follow-up  The patient will follow up on 02/12/2025 to evaluate the effectiveness of the Mckayla 2 Plus sensors.      The patient will monitor her blood glucose levels per continuous glucose monitor.  If she develops problematic hyperglycemia or hypoglycemia or adverse drug reactions, she will contact the office for further instructions.        Follow Up     Return in about 4 weeks (around 2/5/2025) for CGM Follow-Up, Insulin Pump Follow-Up, Medication Management.    Patient was given instructions and counseling regarding her condition or for health maintenance advice. Please see specific information pulled into the AVS if appropriate.     JOSE Bliss  01/08/2025    Patient or patient representative verbalized consent for the use of Ambient Listening during the visit with  JOSE Bliss for chart documentation. 1/9/2025  16:32 EST    Dictated Utilizing Dragon Dictation.  Please note that portions of this note were completed with a voice recognition program.  Part of this note may be an electronic transcription/translation of spoken language to printed text using the Dragon Dictation System.

## 2025-01-08 NOTE — TELEPHONE ENCOUNTER
Dexcom G7 sensors have been failing on day 5 for every sensor since she's been using sensor and she would like to discuss switching to a different sensor that connects with T-Slim - Pt is currently on day 5 and sensor is constantly disconnecting and reading brief sensor issue- Pt has tried diff sites - after discussing with provider pt will be seen in office today 1-8-2024 for a same day

## 2025-01-24 ENCOUNTER — EDUCATION (OUTPATIENT)
Dept: DIABETES SERVICES | Facility: HOSPITAL | Age: 20
End: 2025-01-24
Payer: COMMERCIAL

## 2025-01-24 DIAGNOSIS — E11.8 TYPE 2 DIABETES MELLITUS WITH UNSPECIFIED COMPLICATIONS: Primary | ICD-10-CM

## 2025-01-27 NOTE — PROGRESS NOTES
Dexcom started on patient and paired with insulin pump.Instructed patient to contact [employer] regarding difficulties with pairing with insulin pump.

## 2025-02-03 ENCOUNTER — TELEPHONE (OUTPATIENT)
Dept: PSYCHIATRY | Facility: CLINIC | Age: 20
End: 2025-02-03
Payer: COMMERCIAL

## 2025-02-03 NOTE — TELEPHONE ENCOUNTER
Patient called and asked if provider could write her a letter for an emotional support animal.  I explained that that was usually discussed at an appointment and pt said that she could not wait until an appointment.  Provider please advise

## 2025-02-04 NOTE — TELEPHONE ENCOUNTER
CALLED  PT AND LET HER KNOW THAT THE PAPERWORK WAS PREPARED AND THAT SHE COULD PICK IT UP AT [facility]

## 2025-02-09 NOTE — PROGRESS NOTES
Chief Complaint  Diabetes (Med mgt, A1c eval, cgm/pump eval)    Referred By: JOSE Mcadams    Subjective          Patient or patient representative verbalized consent for the use of Ambient Listening during the visit with  JOSE Bliss for chart documentation. 2/12/2025  13:21 ANDREW Vinson presents to [facility] for insulin pump management    History of Present Illness    History of Present Illness  The patient presents for evaluation of diabetes.    She reports that her insulin pump is not connecting to the Mckayla 2 Plus sensor, prompting a switch back to Dexcom. She has been using the Tandem T: slim X2 insulin pump without a sensor due to these connectivity issues. She was provided with a Dexcom G7 sensor, which she used for 5 days before it malfunctioned. An attempt to use the Mckayla 2 Plus sensor resulted in it detaching after approximately an hour. She has previously used the Dexcom G6 sensor. She continues to take Farxiga and Lyumjev U-200 through her pump, and Actos. She requires refills for Farxiga and Actos. She reports that her insulin is no longer fully covered by her insurance, resulting in out-of-pocket costs of $ 90. She has 4 pens remaining. She expresses concern about her blood sugar levels, which were previously well-controlled but have become elevated since she stopped using the Mckayla 2 Plus sensor. She is considering increasing her Farxiga dosage.    MEDICATIONS  Current: Farxiga, Lyumjev, Actos      Visit type:  follow-up  Diabetes type:  Type 2  Current diabetes status/concerns/issues: Currently on the tandem t:slim X2 insulin pump without a sensor due to connectivity issues with mckayla 2+  Other health concerns: No new health concerns  Current Diabetes symptoms:    Polyuria: Yes     Polydipsia: Yes     Polyphagia: No   Blurred vision: Yes     Excessive fatigue: Yes    Known Diabetes complications:  Neuropathy: Numbness, Tingling, and Pain;  "Location: Feet, Hands, and Bilateral  Renal: No current urine microalbumin available and Normal eGFR per current labs  Eyes: No Retinopathy reported on current eye exam; Location: Bilateral  Amputation/Wounds: None  GI: Nausea  Cardiovascular: Hypertension, Hyperlipidemia, Hypercholesterolemia, and Hypertriglyceridemia  ED: N/A  Other: None  Hypoglycemia:  None reported at this time  Hypoglycemia Symptoms:  No hypoglycemia at this time  Current diabetes treatment:  Farxiga 10 mg daily, Lyumjev U-200 via Tandem t:slim insulin pump, Actos 30 mg daily  Blood glucose device:  Dexcom CGM and Mckayla CGM  Blood glucose monitoring frequency:  Continuous per CGM  Blood glucose range/average:  The 14-day sensor report shows an average glucose of 251mg/dL, with 22% in target range ( mgdL), 27% in the high range (181-250 mg/dL), 50% in the very high range (>250 mg/dL), 0% in the low range (54-70 mg/dL) and 0% in the very low range (<54 mg/dL).   Glucose Source: Device Reviewed  Diet:  Carbohydrate Counting, \"Eat what I want\"/No diet plan, Number of meals each day - 2-3; Number of snacks each day - 1-2  Activity/Exercise:  None    Past Medical History:   Diagnosis Date    Anxiety     COVID 08/07/2022    NO CURRENT SYMPTOMS    Diabetes mellitus     Foot pain, left     Hypertension     Migraines      Past Surgical History:   Procedure Laterality Date    FOOT SURGERY      DISTAL LEFT FIRST METATARSAL    HARDWARE REMOVAL Left 8/26/2022    Procedure: HARDWARE REMOVAL;  left first metatarsal shaft;  Surgeon: Michel Jaffe DPM;  Location: [facility];  Service: Podiatry;  Laterality: Left;    ULNAR OSTEOPLASTY  2018     Family History   Adopted: Yes   Problem Relation Age of Onset    No Known Problems Mother     No Known Problems Father      Social History     Socioeconomic History    Marital status:    Tobacco Use    Smoking status: Never     Passive exposure: Never    Smokeless tobacco: Never   Vaping Use    " Vaping status: Never Used   Substance and Sexual Activity    Alcohol use: Not Currently    Drug use: Not Currently    Sexual activity: Yes     Allergies   Allergen Reactions    Novolog [Insulin Aspart (Human Analog)] Hives       Current Outpatient Medications:     Alcohol Swabs pads, Use 1 Pad Daily., Disp: 100 each, Rfl: 1    aspirin-acetaminophen-caffeine (EXCEDRIN MIGRAINE) 250-250-65 MG per tablet, Take  by mouth., Disp: , Rfl:     Atogepant 60 MG tablet, Take 1 tablet by mouth Daily., Disp: 30 tablet, Rfl: 1    atorvastatin (LIPITOR) 20 MG tablet, Take 1 tablet by mouth Every Night., Disp: 90 tablet, Rfl: 1    BD Pen Needle Amberly 2nd Gen 32G X 4 MM misc, Use 1 each 5 (Five) Times a Day. use as directed, Disp: 200 each, Rfl: 3    chlorthalidone (HYGROTON) 25 MG tablet, TAKE 1 TABLET BY MOUTH DAILY, Disp: 90 tablet, Rfl: 1    Continuous Glucose Sensor (Dexcom G7 Sensor) misc, Use 1 each Every 10 (Ten) Days for 180 days., Disp: 3 each, Rfl: 5    Continuous Glucose Sensor (FreeStyle Mckayal 2 Plus Sensor) misc, Use 2 each Every 30 (Thirty) Days for 180 days., Disp: 2 each, Rfl: 5    dapagliflozin Propanediol (Farxiga) 10 MG tablet, Take 10 mg by mouth Daily., Disp: 90 tablet, Rfl: 3    Desvenlafaxine Succinate ER (Pristiq) 25 MG tablet sustained-release 24 hour, Take 1 tablet by mouth Daily. Take one tablet by mouth daily for 7 days, then increase to two tablets by mouth daily., Disp: 53 tablet, Rfl: 1    glucose blood (OneTouch Verio) test strip, Use as instructed for verification and calibration of DEXCOM G7 up to three times daily., Disp: 100 each, Rfl: 11    glucose monitor monitoring kit, Use 1 each Daily., Disp: 1 each, Rfl: 0    Insulin Glargine (Lantus SoloStar) 100 UNIT/ML injection pen, , Disp: , Rfl:     Insulin Lispro-aabc (Lyumjev KwikPen) 200 UNIT/ML solution pen-injector, Inject 80 Units under the skin into the appropriate area as directed 3 (Three) Times a Day Before Meals for 180 days., Disp: 110 mL,  "Rfl: 1    Insulin Syringe 31G X 5/16\" 1 ML misc, Use 1 syringe Daily., Disp: 100 each, Rfl: 1    lamoTRIgine (LaMICtal) 100 MG tablet, Take 1 tablet by mouth Daily., Disp: 30 tablet, Rfl: 1    Lancets (freestyle) lancets, Use 3 times per day as needed to check blood sugars, Disp: 100 each, Rfl: 4    levothyroxine (SYNTHROID, LEVOTHROID) 25 MCG tablet, TAKE 1 TABLET BY MOUTH EVERY MORNING, Disp: 30 tablet, Rfl: 1    lisinopril (PRINIVIL,ZESTRIL) 40 MG tablet, TAKE 1 TABLET BY MOUTH DAILY, Disp: 90 tablet, Rfl: 1    lurasidone (LATUDA) 40 MG tablet tablet, TAKE 1 TABLET BY MOUTH DAILY, Disp: 30 tablet, Rfl: 1    meclizine (ANTIVERT) 25 MG tablet, Take 1 tablet by mouth 3 (Three) Times a Day As Needed for Dizziness., Disp: 10 tablet, Rfl: 0    Norethin-Eth Estrad-Fe Biphas (Lo Loestrin Fe) 1 MG-10 MCG / 10 MCG tablet, Take 1 tablet by mouth Daily., Disp: 28 tablet, Rfl: 3    omeprazole (priLOSEC) 20 MG capsule, Take 1 capsule by mouth Daily., Disp: 90 capsule, Rfl: 1    ondansetron ODT (ZOFRAN-ODT) 4 MG disintegrating tablet, Place 1 tablet on the tongue Every 4 (Four) Hours As Needed for Nausea or Vomiting., Disp: 15 tablet, Rfl: 0    topiramate (TOPAMAX) 100 MG tablet, Take 1 tablet by mouth Daily. Please take the 25 mg tablet for two weeks in addition to your 50 mg topiramate tablet for total of 75 mg daily, then STOP the 50 mg and 25 mg tablets and take 1 100 mg tablet daily, Disp: 30 tablet, Rfl: 1    topiramate (Topamax) 25 MG tablet, Please take the 25 mg tablet for two weeks in addition to your 50 mg topiramate tablet for total of 75 mg daily, then STOP the 50 mg and 25 mg tablets and take 1 100 mg tablet daily, Disp: 14 tablet, Rfl: 0    pioglitazone (Actos) 45 MG tablet, Take 1 tablet by mouth Daily., Disp: 90 tablet, Rfl: 1    Objective     Vitals:    02/12/25 1300   BP: (!) 160/112   BP Location: Right arm   Patient Position: Sitting   Cuff Size: Large Adult   Pulse: 91   Temp: 98.1 °F (36.7 °C)   TempSrc: " "Temporal   SpO2: 99%   Weight: 95.1 kg (209 lb 9.6 oz)   Height: 157.5 cm (62.01\")     Body mass index is 38.32 kg/m².    Physical Exam  Constitutional:       Appearance: Normal appearance. She is obese.      Comments: Obesity (BMI 30 - 39.9) Pt Current BMI = 38.32      HENT:      Head: Normocephalic and atraumatic.      Right Ear: External ear normal.      Left Ear: External ear normal.      Nose: Nose normal.   Eyes:      Extraocular Movements: Extraocular movements intact.      Conjunctiva/sclera: Conjunctivae normal.   Pulmonary:      Effort: Pulmonary effort is normal.   Musculoskeletal:         General: Normal range of motion.      Cervical back: Normal range of motion.   Skin:     General: Skin is warm and dry.   Neurological:      General: No focal deficit present.      Mental Status: She is alert and oriented to person, place, and time. Mental status is at baseline.   Psychiatric:         Mood and Affect: Mood normal.         Behavior: Behavior normal.         Thought Content: Thought content normal.         Judgment: Judgment normal.       Result Review :   The following data was reviewed by: JOSE Bliss on 02/12/2025:    Most Recent A1C          2/12/2025    13:02   HGBA1C Most Recent   Hemoglobin A1C 9.3        A1C Last 3 Results          12/11/2024    10:52 1/8/2025    16:23 2/12/2025    13:02   HGBA1C Last 3 Results   Hemoglobin A1C 8.5  8.8  9.3      A1c collected in the office today is 9.3%, indicating Uncontrolled Type II diabetes.  This result is up from the prior result of 8.8% collected on 1/8/2025    Glucose   Date Value Ref Range Status   02/12/2025 335 (H) 70 - 99 mg/dL Final     Comment:     Serial Number: [id number]:  [id number]     Point of care glucose in the office today is  elevated at 335 mg/dL    Creatinine   Date Value Ref Range Status   10/25/2024 0.84 0.57 - 1.00 mg/dL Final   09/25/2024 0.67 0.57 - 1.00 mg/dL Final     eGFR   Date Value Ref Range Status   10/25/2024 " 101.5 >60.0 mL/min/1.73 Final   09/25/2024 127.7 >60.0 mL/min/1.73 Final     Labs collected on 10/25/2024 show Normal values    Microalbumin, Urine   Date Value Ref Range Status   02/06/2024 1.4 mg/dL Final   12/09/2022 2.4 mg/dL Final     Total Cholesterol   Date Value Ref Range Status   08/14/2024 235 (H) 0 - 200 mg/dL Final   05/14/2024 182 0 - 200 mg/dL Final     Triglycerides   Date Value Ref Range Status   08/14/2024 480 (H) 0 - 150 mg/dL Final   05/14/2024 373 (H) 0 - 150 mg/dL Final     HDL Cholesterol   Date Value Ref Range Status   08/14/2024 37 (L) 40 - 60 mg/dL Final   05/14/2024 30 (L) 40 - 60 mg/dL Final     LDL Cholesterol    Date Value Ref Range Status   08/14/2024 114 (H) 0 - 100 mg/dL Final   05/14/2024 90 0 - 100 mg/dL Final     Lipid panel collected on 8/14/2024 shows Hyperlipidemia, Hypercholesterolemia, Hypertriglyceridemia, and low HDL            Diagnoses and all orders for this visit:    1. Type 2 diabetes mellitus with hyperglycemia, with long-term current use of insulin (Primary)  -     Microalbumin / Creatinine Urine Ratio - Urine, Clean Catch; Future  -     POC Glycosylated Hemoglobin (Hb A1C)  -     Microalbumin / Creatinine Urine Ratio - Urine, Clean Catch  -     dapagliflozin Propanediol (Farxiga) 10 MG tablet; Take 10 mg by mouth Daily.  Dispense: 90 tablet; Refill: 3  -     Continuous Glucose Sensor (Dexcom G7 Sensor) misc; Use 1 each Every 10 (Ten) Days for 180 days.  Dispense: 3 each; Refill: 5  -     pioglitazone (Actos) 45 MG tablet; Take 1 tablet by mouth Daily.  Dispense: 90 tablet; Refill: 1    2. Type 2 diabetes mellitus with diabetic polyneuropathy, with long-term current use of insulin    3. Primary hypertension    4. Class 2 severe obesity due to excess calories with serious comorbidity and body mass index (BMI) of 38.0 to 38.9 in adult    5. Insulin pump in place    6. Uses self-applied continuous glucose monitoring device    Other orders  -     POC  Glucose        Assessment & Plan  1. Diabetes Mellitus.  Her average blood glucose level is 251 mg/dL, with a time in range of 22%. She experiences high glucose levels 27% of the time (181-250 mg/dL) and very high levels 50% of the time (above 250 mg/dL). Her A1c levels have been progressively increasing, from 8.5% on 12/11/2024, to 8.8% on 01/08/2025, and currently at 9.3%. She is currently using the Tandem T: slim X2 insulin pump without a sensor due to connectivity issues with the Mckayla 2 Plus. She is on Farxiga, Lyumjev U-200 insulin in her pump, and Actos. She is advised to ensure she presses the carbohydrate button during meals to aid in glucose control. A new prescription for Farxiga and  increased dose of Actos will be sent to [facility]. She will be provided with a Dexcom G7 sensor today. Basal rates on her insulin pump have been adjusted to help manage glucose levels when out of automated mode. Her midnight to 7:00 AM basal rate was increased from 1.29 units/h to 1.35 units/h, and her 7:00 AM to midnight basal rate was increased from 1.35 units/h to 1.5 units/h.    Follow-up  The patient will follow up in 1 month.    The patient will monitor her blood glucose levels per continuous glucose monitor.  If she develops problematic hyperglycemia or hypoglycemia or adverse drug reactions, she will contact the office for further instructions.        Follow Up     No follow-ups on file.    Patient was given instructions and counseling regarding her condition or for health maintenance advice. Please see specific information pulled into the AVS if appropriate.     Walter Tate, APRN  02/12/2025    Dictated Utilizing Dragon Dictation.  Please note that portions of this note were completed with a voice recognition program.  Part of this note may be an electronic transcription/translation of spoken language to printed text using the Dragon Dictation System.

## 2025-02-12 ENCOUNTER — OFFICE VISIT (OUTPATIENT)
Dept: DIABETES SERVICES | Facility: HOSPITAL | Age: 20
End: 2025-02-12
Payer: COMMERCIAL

## 2025-02-12 VITALS
TEMPERATURE: 98.1 F | WEIGHT: 209.6 LBS | HEIGHT: 62 IN | DIASTOLIC BLOOD PRESSURE: 112 MMHG | SYSTOLIC BLOOD PRESSURE: 160 MMHG | BODY MASS INDEX: 38.57 KG/M2 | HEART RATE: 91 BPM | OXYGEN SATURATION: 99 %

## 2025-02-12 DIAGNOSIS — E66.01 CLASS 2 SEVERE OBESITY DUE TO EXCESS CALORIES WITH SERIOUS COMORBIDITY AND BODY MASS INDEX (BMI) OF 38.0 TO 38.9 IN ADULT: ICD-10-CM

## 2025-02-12 DIAGNOSIS — E11.65 TYPE 2 DIABETES MELLITUS WITH HYPERGLYCEMIA, WITH LONG-TERM CURRENT USE OF INSULIN: Primary | ICD-10-CM

## 2025-02-12 DIAGNOSIS — Z79.4 TYPE 2 DIABETES MELLITUS WITH HYPERGLYCEMIA, WITH LONG-TERM CURRENT USE OF INSULIN: Primary | ICD-10-CM

## 2025-02-12 DIAGNOSIS — Z46.81 INSULIN PUMP TITRATION: ICD-10-CM

## 2025-02-12 DIAGNOSIS — Z79.4 TYPE 2 DIABETES MELLITUS WITH DIABETIC POLYNEUROPATHY, WITH LONG-TERM CURRENT USE OF INSULIN: ICD-10-CM

## 2025-02-12 DIAGNOSIS — E11.42 TYPE 2 DIABETES MELLITUS WITH DIABETIC POLYNEUROPATHY, WITH LONG-TERM CURRENT USE OF INSULIN: ICD-10-CM

## 2025-02-12 DIAGNOSIS — I10 PRIMARY HYPERTENSION: ICD-10-CM

## 2025-02-12 DIAGNOSIS — E66.812 CLASS 2 SEVERE OBESITY DUE TO EXCESS CALORIES WITH SERIOUS COMORBIDITY AND BODY MASS INDEX (BMI) OF 38.0 TO 38.9 IN ADULT: ICD-10-CM

## 2025-02-12 DIAGNOSIS — Z97.8 USES SELF-APPLIED CONTINUOUS GLUCOSE MONITORING DEVICE: ICD-10-CM

## 2025-02-12 LAB
ALBUMIN UR-MCNC: 10.2 MG/DL
CREAT UR-MCNC: 110.5 MG/DL
EXPIRATION DATE: ABNORMAL
GLUCOSE BLDC GLUCOMTR-MCNC: 335 MG/DL (ref 70–99)
HBA1C MFR BLD: 9.3 % (ref 4.5–5.7)
Lab: ABNORMAL
MICROALBUMIN/CREAT UR: 92.3 MG/G (ref 0–29)

## 2025-02-12 PROCEDURE — 95251 CONT GLUC MNTR ANALYSIS I&R: CPT

## 2025-02-12 PROCEDURE — G0463 HOSPITAL OUTPT CLINIC VISIT: HCPCS

## 2025-02-12 PROCEDURE — 99214 OFFICE O/P EST MOD 30 MIN: CPT

## 2025-02-12 PROCEDURE — 82948 REAGENT STRIP/BLOOD GLUCOSE: CPT

## 2025-02-12 PROCEDURE — 82570 ASSAY OF URINE CREATININE: CPT

## 2025-02-12 PROCEDURE — 82043 UR ALBUMIN QUANTITATIVE: CPT

## 2025-02-12 PROCEDURE — 83036 HEMOGLOBIN GLYCOSYLATED A1C: CPT

## 2025-02-12 RX ORDER — DAPAGLIFLOZIN 10 MG/1
10 TABLET, FILM COATED ORAL DAILY
Qty: 90 TABLET | Refills: 3 | Status: SHIPPED | OUTPATIENT
Start: 2025-02-12

## 2025-02-12 RX ORDER — PIOGLITAZONE 45 MG/1
45 TABLET ORAL DAILY
Qty: 90 TABLET | Refills: 1 | Status: SHIPPED | OUTPATIENT
Start: 2025-02-12

## 2025-02-12 RX ORDER — ACYCLOVIR 400 MG/1
1 TABLET ORAL
Qty: 3 EACH | Refills: 5 | Status: SHIPPED | OUTPATIENT
Start: 2025-02-12 | End: 2025-08-11

## 2025-02-26 ENCOUNTER — OFFICE VISIT (OUTPATIENT)
Dept: INTERNAL MEDICINE | Facility: CLINIC | Age: 20
End: 2025-02-26
Payer: COMMERCIAL

## 2025-02-26 VITALS
BODY MASS INDEX: 38.09 KG/M2 | HEIGHT: 62 IN | HEART RATE: 101 BPM | OXYGEN SATURATION: 98 % | SYSTOLIC BLOOD PRESSURE: 180 MMHG | DIASTOLIC BLOOD PRESSURE: 118 MMHG | TEMPERATURE: 98 F | RESPIRATION RATE: 16 BRPM | WEIGHT: 207 LBS

## 2025-02-26 DIAGNOSIS — Z91.199 NONCOMPLIANCE: ICD-10-CM

## 2025-02-26 DIAGNOSIS — G43.E19 INTRACTABLE CHRONIC MIGRAINE WITH AURA AND WITHOUT STATUS MIGRAINOSUS: ICD-10-CM

## 2025-02-26 DIAGNOSIS — F31.81 BIPOLAR 2 DISORDER: ICD-10-CM

## 2025-02-26 DIAGNOSIS — E03.9 HYPOTHYROIDISM, UNSPECIFIED TYPE: ICD-10-CM

## 2025-02-26 DIAGNOSIS — E11.65 TYPE 2 DIABETES MELLITUS WITH HYPERGLYCEMIA, WITHOUT LONG-TERM CURRENT USE OF INSULIN: ICD-10-CM

## 2025-02-26 DIAGNOSIS — E78.1 HYPERTRIGLYCERIDEMIA: ICD-10-CM

## 2025-02-26 DIAGNOSIS — I10 HYPERTENSION, UNSPECIFIED TYPE: Chronic | ICD-10-CM

## 2025-02-26 DIAGNOSIS — E34.8 PINEAL GLAND CYST: ICD-10-CM

## 2025-02-26 DIAGNOSIS — F33.0 MAJOR DEPRESSIVE DISORDER, RECURRENT EPISODE, MILD DEGREE: ICD-10-CM

## 2025-02-26 DIAGNOSIS — I10 PRIMARY HYPERTENSION: Primary | ICD-10-CM

## 2025-02-26 LAB
ALBUMIN SERPL-MCNC: 3.7 G/DL (ref 3.5–5.2)
ALBUMIN/GLOB SERPL: 1 G/DL
ALP SERPL-CCNC: 153 U/L (ref 39–117)
ALT SERPL W P-5'-P-CCNC: 61 U/L (ref 1–33)
ANION GAP SERPL CALCULATED.3IONS-SCNC: 14.3 MMOL/L (ref 5–15)
AST SERPL-CCNC: 145 U/L (ref 1–32)
BASOPHILS # BLD AUTO: 0.05 10*3/MM3 (ref 0–0.2)
BASOPHILS NFR BLD AUTO: 0.4 % (ref 0–1.5)
BILIRUB SERPL-MCNC: 0.3 MG/DL (ref 0–1.2)
BUN SERPL-MCNC: 9 MG/DL (ref 6–20)
BUN/CREAT SERPL: 10.6 (ref 7–25)
CALCIUM SPEC-SCNC: 10.2 MG/DL (ref 8.6–10.5)
CHLORIDE SERPL-SCNC: 99 MMOL/L (ref 98–107)
CHOLEST SERPL-MCNC: 211 MG/DL (ref 0–200)
CO2 SERPL-SCNC: 23.7 MMOL/L (ref 22–29)
CREAT SERPL-MCNC: 0.85 MG/DL (ref 0.57–1)
DEPRECATED RDW RBC AUTO: 39.9 FL (ref 37–54)
EGFRCR SERPLBLD CKD-EPI 2021: 99.5 ML/MIN/1.73
EOSINOPHIL # BLD AUTO: 0.2 10*3/MM3 (ref 0–0.4)
EOSINOPHIL NFR BLD AUTO: 1.5 % (ref 0.3–6.2)
ERYTHROCYTE [DISTWIDTH] IN BLOOD BY AUTOMATED COUNT: 12.8 % (ref 12.3–15.4)
GLOBULIN UR ELPH-MCNC: 3.8 GM/DL
GLUCOSE SERPL-MCNC: 261 MG/DL (ref 65–99)
HCT VFR BLD AUTO: 45.3 % (ref 34–46.6)
HDLC SERPL-MCNC: 23 MG/DL (ref 40–60)
HGB BLD-MCNC: 15.1 G/DL (ref 12–15.9)
IMM GRANULOCYTES # BLD AUTO: 0.09 10*3/MM3 (ref 0–0.05)
IMM GRANULOCYTES NFR BLD AUTO: 0.7 % (ref 0–0.5)
LDLC SERPL CALC-MCNC: 118 MG/DL (ref 0–100)
LDLC/HDLC SERPL: 4.72 {RATIO}
LYMPHOCYTES # BLD AUTO: 3.67 10*3/MM3 (ref 0.7–3.1)
LYMPHOCYTES NFR BLD AUTO: 28.2 % (ref 19.6–45.3)
MCH RBC QN AUTO: 28.9 PG (ref 26.6–33)
MCHC RBC AUTO-ENTMCNC: 33.3 G/DL (ref 31.5–35.7)
MCV RBC AUTO: 86.6 FL (ref 79–97)
MONOCYTES # BLD AUTO: 0.69 10*3/MM3 (ref 0.1–0.9)
MONOCYTES NFR BLD AUTO: 5.3 % (ref 5–12)
NEUTROPHILS NFR BLD AUTO: 63.9 % (ref 42.7–76)
NEUTROPHILS NFR BLD AUTO: 8.33 10*3/MM3 (ref 1.7–7)
NRBC BLD AUTO-RTO: 0 /100 WBC (ref 0–0.2)
PLATELET # BLD AUTO: 392 10*3/MM3 (ref 140–450)
PMV BLD AUTO: 11.2 FL (ref 6–12)
POTASSIUM SERPL-SCNC: 4.2 MMOL/L (ref 3.5–5.2)
PROT SERPL-MCNC: 7.5 G/DL (ref 6–8.5)
RBC # BLD AUTO: 5.23 10*6/MM3 (ref 3.77–5.28)
SODIUM SERPL-SCNC: 137 MMOL/L (ref 136–145)
T4 FREE SERPL-MCNC: 1.18 NG/DL (ref 0.92–1.68)
TRIGL SERPL-MCNC: 397 MG/DL (ref 0–150)
TSH SERPL DL<=0.05 MIU/L-ACNC: 8.42 UIU/ML (ref 0.27–4.2)
VLDLC SERPL-MCNC: 70 MG/DL (ref 5–40)
WBC NRBC COR # BLD AUTO: 13.03 10*3/MM3 (ref 3.4–10.8)

## 2025-02-26 PROCEDURE — 84439 ASSAY OF FREE THYROXINE: CPT | Performed by: PHYSICIAN ASSISTANT

## 2025-02-26 PROCEDURE — 80061 LIPID PANEL: CPT | Performed by: PHYSICIAN ASSISTANT

## 2025-02-26 PROCEDURE — 80050 GENERAL HEALTH PANEL: CPT | Performed by: PHYSICIAN ASSISTANT

## 2025-02-26 RX ORDER — CHLORTHALIDONE 25 MG/1
25 TABLET ORAL DAILY
Qty: 30 TABLET | Refills: 0 | Status: SHIPPED | OUTPATIENT
Start: 2025-02-26

## 2025-02-26 RX ORDER — ATORVASTATIN CALCIUM 20 MG/1
20 TABLET, FILM COATED ORAL NIGHTLY
Qty: 30 TABLET | Refills: 0 | Status: SHIPPED | OUTPATIENT
Start: 2025-02-26

## 2025-02-26 RX ORDER — LEVOTHYROXINE SODIUM 25 UG/1
25 TABLET ORAL
Qty: 30 TABLET | Refills: 0 | Status: SHIPPED | OUTPATIENT
Start: 2025-02-26

## 2025-02-26 RX ORDER — LISINOPRIL 40 MG/1
40 TABLET ORAL DAILY
Qty: 30 TABLET | Refills: 0 | Status: SHIPPED | OUTPATIENT
Start: 2025-02-26

## 2025-02-26 NOTE — PROGRESS NOTES
"Chief Complaint  Hypertension, Hypothyroidism, Headache, and Depression    Subjective          Amarilis Vinson presents to [facility]  History of Present Illness  Pt admitted to [facility] Nov 2024. States they \"messed up her medicine\"  She has been off medications since discharge     Bipolar: pt states she does not want to take any mental health  medication until she sees psych at [facility]   She has a constant thought of harming self but no plan  She denies current thoughts  She is with  in office today  Pt states she feels safe at home    Hypothyroid: has been off synthroid since Nov  HTN: has been off bp med since Nov  Denies chest pain, palpitations, ha, dizziness, sob, unilateral weakness, slurred speech  DM: Seeing endo. Has on insulin pump  Has not been taking any other medicine  Migraines: has been on several medications, wants to see neurology before restarting medicine  She uses excedrin otc  Past Medical History:   Diagnosis Date    Anxiety     COVID 08/07/2022    NO CURRENT SYMPTOMS    Diabetes mellitus     Foot pain, left     Hypertension     Migraines         Past Surgical History:   Procedure Laterality Date    FOOT SURGERY      DISTAL LEFT FIRST METATARSAL    HARDWARE REMOVAL Left 8/26/2022    Procedure: HARDWARE REMOVAL;  left first metatarsal shaft;  Surgeon: Michel Jaffe DPM;  Location: [facility] OR;  Service: Podiatry;  Laterality: Left;    ULNAR OSTEOPLASTY  2018        Current Outpatient Medications on File Prior to Visit   Medication Sig Dispense Refill    Alcohol Swabs pads Use 1 Pad Daily. 100 each 1    aspirin-acetaminophen-caffeine (EXCEDRIN MIGRAINE) 250-250-65 MG per tablet Take  by mouth.      Continuous Glucose Sensor (Dexcom G7 Sensor) misc Use 1 each Every 10 (Ten) Days for 180 days. 3 each 5    Insulin Lispro-aabc (Jaspreet Da Silva) 200 UNIT/ML solution pen-injector Inject 80 Units under the skin into the " "appropriate area as directed 3 (Three) Times a Day Before Meals for 180 days. 110 mL 1    Norethin-Eth Estrad-Fe Biphas (Lo Loestrin Fe) 1 MG-10 MCG / 10 MCG tablet Take 1 tablet by mouth Daily. 28 tablet 3    omeprazole (priLOSEC) 20 MG capsule Take 1 capsule by mouth Daily. 90 capsule 1    ondansetron ODT (ZOFRAN-ODT) 4 MG disintegrating tablet Place 1 tablet on the tongue Every 4 (Four) Hours As Needed for Nausea or Vomiting. 15 tablet 0    [DISCONTINUED] pioglitazone (Actos) 45 MG tablet Take 1 tablet by mouth Daily. (Patient not taking: Reported on 2/26/2025) 90 tablet 1    [DISCONTINUED] topiramate (TOPAMAX) 100 MG tablet Take 1 tablet by mouth Daily. Please take the 25 mg tablet for two weeks in addition to your 50 mg topiramate tablet for total of 75 mg daily, then STOP the 50 mg and 25 mg tablets and take 1 100 mg tablet daily (Patient not taking: Reported on 2/26/2025) 30 tablet 1    [DISCONTINUED] topiramate (Topamax) 25 MG tablet Please take the 25 mg tablet for two weeks in addition to your 50 mg topiramate tablet for total of 75 mg daily, then STOP the 50 mg and 25 mg tablets and take 1 100 mg tablet daily 14 tablet 0     No current facility-administered medications on file prior to visit.        Allergies   Allergen Reactions    Novolog [Insulin Aspart (Human Analog)] Hives       Social History     Tobacco Use   Smoking Status Never    Passive exposure: Never   Smokeless Tobacco Never          Objective   Vital Signs:   BP (!) 180/118 (BP Location: Left arm, Patient Position: Sitting, Cuff Size: Adult)   Pulse 101   Temp 98 °F (36.7 °C) (Temporal)   Resp 16   Ht 157.5 cm (62.01\")   Wt 93.9 kg (207 lb)   SpO2 98%   BMI 37.85 kg/m²     Physical Exam  Vitals reviewed.   Constitutional:       Appearance: Normal appearance.   HENT:      Head: Normocephalic and atraumatic.      Nose: Nose normal.      Mouth/Throat:      Mouth: Mucous membranes are moist.   Eyes:      Extraocular Movements: " Extraocular movements intact.      Conjunctiva/sclera: Conjunctivae normal.      Pupils: Pupils are equal, round, and reactive to light.   Cardiovascular:      Rate and Rhythm: Normal rate and regular rhythm.   Pulmonary:      Effort: Pulmonary effort is normal.      Breath sounds: Normal breath sounds.   Abdominal:      General: Abdomen is flat. Bowel sounds are normal.      Palpations: Abdomen is soft.   Musculoskeletal:         General: Normal range of motion.   Neurological:      General: No focal deficit present.      Mental Status: She is alert and oriented to person, place, and time.   Psychiatric:         Mood and Affect: Mood normal.        Result Review :                           Assessment and Plan    Diagnoses and all orders for this visit:    1. Primary hypertension (Primary)  Assessment & Plan:  Discussed blood pressure elevation at today's visit. Risks of blood pressure elevation include death, heart attack, stroke, kidney disease, blindness. Low salt diet, increase exercise. Discussed importance of medication compliance and not missing doses. Restart medications at this time. We will monitor at follow up and adjust medications if still elevated. To er if chest pain, palpitations, vision loss, unilateral weakness, altered mental status. Pt understands and agrees with plan.      Orders:  -     Comprehensive Metabolic Panel  -     CBC & Differential    2. Hypertriglyceridemia  Assessment & Plan:  Labs today, will restart statin. Discussed importance with HTN and DM to decrease cardiac risk factors.     Orders:  -     Lipid Panel    3. Bipolar 2 disorder    4. Intractable chronic migraine with aura and without status migrainosus  Assessment & Plan:  Will refer to neuro per patient request.              Orders:  -     Ambulatory Referral to Neurology    5. Hypothyroidism, unspecified type  -     TSH  -     T4, Free    6. Type 2 diabetes mellitus with hyperglycemia, without long-term current use of  insulin  -     atorvastatin (LIPITOR) 20 MG tablet; Take 1 tablet by mouth Every Night.  Dispense: 30 tablet; Refill: 0    7. Pineal gland cyst  -     Ambulatory Referral to Neurology    8. Hypertension, unspecified type  -     lisinopril (PRINIVIL,ZESTRIL) 40 MG tablet; Take 1 tablet by mouth Daily.  Dispense: 30 tablet; Refill: 0    9. Type 2 diabetes mellitus with hyperglycemia, without long-term current use of insulin  Comments:  Follow up with endo as directed.  Orders:  -     atorvastatin (LIPITOR) 20 MG tablet; Take 1 tablet by mouth Every Night.  Dispense: 30 tablet; Refill: 0    10. Noncompliance    11. Major depressive disorder, recurrent episode, mild degree  Assessment & Plan:  Discussed mood with patient and need to restart medications, patient declined.  Referral placed to psych per requested to [facility]. Pt given number to call/schedule as well.      Other orders  -     chlorthalidone (HYGROTON) 25 MG tablet; Take 1 tablet by mouth Daily.  Dispense: 30 tablet; Refill: 0  -     levothyroxine (SYNTHROID, LEVOTHROID) 25 MCG tablet; Take 1 tablet by mouth Every Morning.  Dispense: 30 tablet; Refill: 0        Follow Up   Return in about 1 week (around 3/5/2025).  Patient was given instructions and counseling regarding her condition or for health maintenance advice. Please see specific information pulled into the AVS if appropriate.

## 2025-02-27 DIAGNOSIS — R79.89 ELEVATED LFTS: Primary | ICD-10-CM

## 2025-02-27 NOTE — ASSESSMENT & PLAN NOTE
Labs today, will restart statin. Discussed importance with HTN and DM to decrease cardiac risk factors.

## 2025-02-27 NOTE — ASSESSMENT & PLAN NOTE
Discussed mood with patient and need to restart medications, patient declined.  Referral placed to psych per requested to [facility]. Pt given number to call/schedule as well.

## 2025-03-04 ENCOUNTER — TELEPHONE (OUTPATIENT)
Dept: PSYCHIATRY | Facility: CLINIC | Age: 20
End: 2025-03-04
Payer: COMMERCIAL

## 2025-03-04 ENCOUNTER — HOSPITAL ENCOUNTER (OUTPATIENT)
Dept: ULTRASOUND IMAGING | Facility: HOSPITAL | Age: 20
Discharge: HOME | End: 2025-03-04
Admitting: PHYSICIAN ASSISTANT
Payer: COMMERCIAL

## 2025-03-04 DIAGNOSIS — R79.89 ELEVATED LFTS: ICD-10-CM

## 2025-03-04 PROCEDURE — 76705 ECHO EXAM OF ABDOMEN: CPT

## 2025-03-04 NOTE — TELEPHONE ENCOUNTER
[facility], called [facility] to confirm apt, spoke with nolberto, states patient was admitted for iop on 09/24/24, and then admitted for inpatient on 12/04/24, states she will contact medical records to fax over notes. Closing out referral

## 2025-03-06 DIAGNOSIS — R79.89 ELEVATED LFTS: ICD-10-CM

## 2025-03-06 DIAGNOSIS — K76.0 FATTY LIVER: Primary | ICD-10-CM

## 2025-03-09 NOTE — PROGRESS NOTES
Chief Complaint  Diabetes (Med mgt, A1C eval, CGM/PUMP eval)    Referred By: JOSE Mcadams    Subjective          Patient or patient representative verbalized consent for the use of Ambient Listening during the visit with  JOSE Bliss for chart documentation. 3/13/2025  13:50 EDT    Amarilis Vinson presents to [facility] for insulin pump management    History of Present Illness    History of Present Illness  The patient presents for evaluation of diabetes.    She has been utilizing the Tandem janes to manage her diabetes, which she has successfully linked with her pump. Upon entering the clinic, she applied a new Dexcom sensor, which initially read a blood glucose level of 138. However, a subsequent reading indicated a level of 179. Following calibration, the device now shows a reading of 173. She has expressed interest in trying both the Dexcom and Mckayla systems concurrently. She has been making efforts to reduce her carbohydrate intake. She has been adhering to a low-cholesterol diet due to a recently diagnosed liver cyst. She reports an incident where her pump was dislodged while at a friend's house, and she did not have a replacement with her. She is currently experiencing difficulties in obtaining her insulin from [facility]. She is uncertain about her current medication regimen, but believes she is still taking Actos. She was unable to refill her Farxiga prescription due to cost concerns.    MEDICATIONS  Current: Actos, Farxiga      Visit type:  follow-up  Diabetes type:  Type 2  Current diabetes status/concerns/issues:  No concerns  Other health concerns: No new health concerns  Current Diabetes symptoms:    Polyuria: Yes     Polydipsia: Yes     Polyphagia: No   Blurred vision: Yes     Excessive fatigue: Yes    Known Diabetes complications:  Neuropathy: Numbness, Tingling, and Pain; Location: Feet, Hands, and Bilateral  Renal: Normal eGFR per current labs and  "Microalbuminuria - POSITIVE  Eyes: No Retinopathy reported on current eye exam; Location: Bilateral  Amputation/Wounds: None  GI: Nausea  Cardiovascular: Hypertension, Hyperlipidemia, Hypercholesterolemia, and Hypertriglyceridemia  ED: N/A  Other: None  Hypoglycemia:  None reported at this time  Hypoglycemia Symptoms:  No hypoglycemia at this time  Current diabetes treatment: Farxiga 10 mg daily Lyumjev U200 via tandem t:slim insulin pump, Actos 30 mg daily  Blood glucose device:  Dexcom CGM and Mckayla CGM  Blood glucose monitoring frequency:  Continuous per CGM  Blood glucose range/average:  The 14-day sensor report shows an average glucose of 262mg/dL, with 19% in target range ( mgdL), 29% in the high range (181-250 mg/dL), 52% in the very high range (>250 mg/dL), 0% in the low range (54-70 mg/dL) and 0% in the very low range (<54 mg/dL).   Glucose Source: Device Reviewed  Diet:  Carbohydrate Counting, \"Eat what I want\"/No diet plan, Number of meals each day - 2-3; Number of snacks each day - 1-2  Activity/Exercise:  None    Past Medical History:   Diagnosis Date    Anxiety     COVID 08/07/2022    NO CURRENT SYMPTOMS    Diabetes mellitus     Foot pain, left     Hypertension     Migraines      Past Surgical History:   Procedure Laterality Date    FOOT SURGERY      DISTAL LEFT FIRST METATARSAL    HARDWARE REMOVAL Left 8/26/2022    Procedure: HARDWARE REMOVAL;  left first metatarsal shaft;  Surgeon: Michel Jaffe DPM;  Location: [facility];  Service: Podiatry;  Laterality: Left;    ULNAR OSTEOPLASTY  2018     Family History   Adopted: Yes   Problem Relation Age of Onset    No Known Problems Mother     No Known Problems Father      Social History     Socioeconomic History    Marital status:    Tobacco Use    Smoking status: Never     Passive exposure: Never    Smokeless tobacco: Never   Vaping Use    Vaping status: Never Used   Substance and Sexual Activity    Alcohol use: Not Currently    " "Drug use: Not Currently    Sexual activity: Yes     Allergies   Allergen Reactions    Novolog [Insulin Aspart (Human Analog)] Hives       Current Outpatient Medications:     Alcohol Swabs pads, Use 1 Pad Daily., Disp: 100 each, Rfl: 1    aspirin-acetaminophen-caffeine (EXCEDRIN MIGRAINE) 250-250-65 MG per tablet, Take  by mouth., Disp: , Rfl:     atorvastatin (LIPITOR) 20 MG tablet, Take 1 tablet by mouth Every Night., Disp: 30 tablet, Rfl: 0    chlorthalidone (HYGROTON) 25 MG tablet, Take 1 tablet by mouth Daily., Disp: 30 tablet, Rfl: 0    Continuous Glucose Sensor (Dexcom G7 Sensor) misc, Use 1 each Every 10 (Ten) Days for 180 days., Disp: 3 each, Rfl: 5    Insulin Lispro-aabc (Lyumjev KwikPen) 200 UNIT/ML solution pen-injector, Inject 80 Units under the skin into the appropriate area as directed 3 (Three) Times a Day Before Meals for 180 days., Disp: 110 mL, Rfl: 1    Norethin-Eth Estrad-Fe Biphas (Lo Loestrin Fe) 1 MG-10 MCG / 10 MCG tablet, Take 1 tablet by mouth Daily., Disp: 28 tablet, Rfl: 3    ondansetron ODT (ZOFRAN-ODT) 4 MG disintegrating tablet, Place 1 tablet on the tongue Every 4 (Four) Hours As Needed for Nausea or Vomiting., Disp: 15 tablet, Rfl: 0    amLODIPine (NORVASC) 10 MG tablet, Take 1 tablet by mouth Daily., Disp: 30 tablet, Rfl: 1    levothyroxine (SYNTHROID, LEVOTHROID) 25 MCG tablet, Take 1 tablet by mouth Every Morning., Disp: 30 tablet, Rfl: 1    losartan (Cozaar) 100 MG tablet, Take 1 tablet by mouth Daily., Disp: 30 tablet, Rfl: 1    omeprazole (priLOSEC) 20 MG capsule, Take 1 capsule by mouth Daily., Disp: 90 capsule, Rfl: 1    pioglitazone (Actos) 45 MG tablet, Take 1 tablet by mouth Daily., Disp: 30 tablet, Rfl: 2    Objective     Vitals:    03/12/25 1328   BP: (!) 173/124   BP Location: Left arm   Patient Position: Sitting   Cuff Size: Adult   Pulse: 88   SpO2: 97%   Weight: 93.6 kg (206 lb 6.4 oz)   Height: 157.5 cm (62.01\")     Body mass index is 37.74 kg/m².    Physical " Exam  Constitutional:       Appearance: Normal appearance. She is obese.      Comments: Severe Obesity with Comorbidity ( BMI 35 - 39.9) Pt Current BMI = 37.74 with comorbidities of hypertension, hyperlipidemia, and diabetes     HENT:      Head: Normocephalic and atraumatic.      Right Ear: External ear normal.      Left Ear: External ear normal.      Nose: Nose normal.   Eyes:      Extraocular Movements: Extraocular movements intact.      Conjunctiva/sclera: Conjunctivae normal.   Pulmonary:      Effort: Pulmonary effort is normal.   Musculoskeletal:         General: Normal range of motion.      Cervical back: Normal range of motion.   Skin:     General: Skin is warm and dry.   Neurological:      General: No focal deficit present.      Mental Status: She is alert and oriented to person, place, and time. Mental status is at baseline.   Psychiatric:         Mood and Affect: Mood normal.         Behavior: Behavior normal.         Thought Content: Thought content normal.         Judgment: Judgment normal.         Result Review :   The following data was reviewed by: JOSE Bliss on 03/12/2025:    Most Recent A1C          3/12/2025    13:38   HGBA1C Most Recent   Hemoglobin A1C 9.3        A1C Last 3 Results          1/8/2025    16:23 2/12/2025    13:02 3/12/2025    13:38   HGBA1C Last 3 Results   Hemoglobin A1C 8.8  9.3  9.3      A1c collected in the office today is 9.3%, indicating Uncontrolled Type II diabetes.  This result is unchanged from the prior result of 9.3% collected on 2/12/2025    Glucose   Date Value Ref Range Status   03/12/2025 173 (H) 70 - 99 mg/dL Final     Comment:     Serial Number: [id number]:  [id number]     Point of care glucose in the office today is within normal limits for nonfasting glucose    Creatinine   Date Value Ref Range Status   02/26/2025 0.85 0.57 - 1.00 mg/dL Final   10/25/2024 0.84 0.57 - 1.00 mg/dL Final     eGFR   Date Value Ref Range Status   02/26/2025 99.5 >60.0  mL/min/1.73 Final   10/25/2024 101.5 >60.0 mL/min/1.73 Final     Labs collected on 2/26/2025 show Normal values    Microalbumin, Urine   Date Value Ref Range Status   02/12/2025 10.2 mg/dL Final   02/06/2024 1.4 mg/dL Final     Creatinine, Urine   Date Value Ref Range Status   02/12/2025 110.5 mg/dL Final     Microalbumin/Creatinine Ratio   Date Value Ref Range Status   02/12/2025 92.3 (H) 0.0 - 29.0 mg/g Final     Urine microalbuminuria collected on 2/12/2025 is positive for microalbuminuria    Total Cholesterol   Date Value Ref Range Status   02/26/2025 211 (H) 0 - 200 mg/dL Final   08/14/2024 235 (H) 0 - 200 mg/dL Final     Triglycerides   Date Value Ref Range Status   02/26/2025 397 (H) 0 - 150 mg/dL Final   08/14/2024 480 (H) 0 - 150 mg/dL Final     HDL Cholesterol   Date Value Ref Range Status   02/26/2025 23 (L) 40 - 60 mg/dL Final   08/14/2024 37 (L) 40 - 60 mg/dL Final     LDL Cholesterol    Date Value Ref Range Status   02/26/2025 118 (H) 0 - 100 mg/dL Final   08/14/2024 114 (H) 0 - 100 mg/dL Final     Lipid panel collected on 2/26/2025 shows Hyperlipidemia, Hypercholesterolemia, Hypertriglyceridemia, and low HDL            Diagnoses and all orders for this visit:    1. Type 2 diabetes mellitus with hyperglycemia, with long-term current use of insulin (Primary)  -     POC Glycosylated Hemoglobin (Hb A1C)  -     pioglitazone (Actos) 45 MG tablet; Take 1 tablet by mouth Daily.  Dispense: 30 tablet; Refill: 2    2. Type 2 diabetes mellitus with diabetic polyneuropathy, with long-term current use of insulin    3. Primary hypertension    4. Insulin pump titration    5. Uses self-applied continuous glucose monitoring device    6. Class 2 severe obesity due to excess calories with serious comorbidity and body mass index (BMI) of 37.0 to 37.9 in adult    Other orders  -     POC Glucose        Assessment & Plan  1. Diabetes Mellitus.  Her A1c levels have remained stable. The sensor report indicates an average blood  glucose level of 262 mg/dL over the past 14 days, with 19 percent of readings within the target range, 29 percent in the high range (181 to 250 mg/dL), and a significant portion exceeding 250 mg/dL. There have been no instances of hypoglycemia or severe hypoglycemia. She will be provided with two Mckayla 2+ sensors. She is advised to delete the Mckayla janes from her phone and use the Tandem janes instead. Minor adjustments have been made to her insulin pump settings: the basal rate from midnight to 7 AM has been increased from 1.35 to 1.75 units per hour, and the correction factor has been adjusted to 1 unit for every 28 mg/dL above 110 mg/dL. From 7 AM to midnight, the basal rate has been increased from 1.5 to 2 units per hour, with a correction factor of 1 unit for every 25 mg/dL above 110 mg/dL. A prescription for Actos (pioglitazone) has been refilled. She is advised to purchase this medication from [facility] if [facility] charges excessively. Four boxes of Farxiga, each containing a week's supply, have been provided. She is also given skin  for her sensors.    Follow-up  The patient is scheduled for a follow-up visit in 1 month.    The patient will monitor her blood glucose levels per continuous glucose monitor.  If she develops problematic hyperglycemia or hypoglycemia or adverse drug reactions, she will contact the office for further instructions.        Follow Up     Return in about 4 weeks (around 4/9/2025) for Medication Management, Insulin Pump Follow-Up, CGM Follow-Up.    Patient was given instructions and counseling regarding her condition or for health maintenance advice. Please see specific information pulled into the AVS if appropriate.     Walter Tate, JOSE  03/12/2025    Dictated Utilizing Dragon Dictation.  Please note that portions of this note were completed with a voice recognition program.  Part of this note may be an electronic transcription/translation of spoken language to printed text using  the Dragon Dictation System.

## 2025-03-12 ENCOUNTER — OFFICE VISIT (OUTPATIENT)
Dept: INTERNAL MEDICINE | Facility: CLINIC | Age: 20
End: 2025-03-12
Payer: COMMERCIAL

## 2025-03-12 ENCOUNTER — OFFICE VISIT (OUTPATIENT)
Dept: DIABETES SERVICES | Facility: HOSPITAL | Age: 20
End: 2025-03-12
Payer: COMMERCIAL

## 2025-03-12 ENCOUNTER — PRIOR AUTHORIZATION (OUTPATIENT)
Dept: INTERNAL MEDICINE | Facility: CLINIC | Age: 20
End: 2025-03-12
Payer: COMMERCIAL

## 2025-03-12 VITALS
SYSTOLIC BLOOD PRESSURE: 173 MMHG | HEART RATE: 88 BPM | BODY MASS INDEX: 37.98 KG/M2 | WEIGHT: 206.4 LBS | HEIGHT: 62 IN | OXYGEN SATURATION: 97 % | DIASTOLIC BLOOD PRESSURE: 124 MMHG

## 2025-03-12 VITALS
HEIGHT: 62 IN | HEART RATE: 106 BPM | DIASTOLIC BLOOD PRESSURE: 120 MMHG | OXYGEN SATURATION: 98 % | TEMPERATURE: 98.2 F | SYSTOLIC BLOOD PRESSURE: 178 MMHG | WEIGHT: 209.2 LBS | BODY MASS INDEX: 38.5 KG/M2

## 2025-03-12 DIAGNOSIS — Z79.4 TYPE 2 DIABETES MELLITUS WITH HYPERGLYCEMIA, WITH LONG-TERM CURRENT USE OF INSULIN: Primary | ICD-10-CM

## 2025-03-12 DIAGNOSIS — E03.9 HYPOTHYROIDISM, UNSPECIFIED TYPE: ICD-10-CM

## 2025-03-12 DIAGNOSIS — E66.01 CLASS 2 SEVERE OBESITY DUE TO EXCESS CALORIES WITH SERIOUS COMORBIDITY AND BODY MASS INDEX (BMI) OF 37.0 TO 37.9 IN ADULT: ICD-10-CM

## 2025-03-12 DIAGNOSIS — E11.42 TYPE 2 DIABETES MELLITUS WITH DIABETIC POLYNEUROPATHY, WITH LONG-TERM CURRENT USE OF INSULIN: ICD-10-CM

## 2025-03-12 DIAGNOSIS — I10 PRIMARY HYPERTENSION: Primary | ICD-10-CM

## 2025-03-12 DIAGNOSIS — Z79.4 TYPE 2 DIABETES MELLITUS WITH DIABETIC POLYNEUROPATHY, WITH LONG-TERM CURRENT USE OF INSULIN: ICD-10-CM

## 2025-03-12 DIAGNOSIS — K21.9 GERD WITHOUT ESOPHAGITIS: ICD-10-CM

## 2025-03-12 DIAGNOSIS — Z97.8 USES SELF-APPLIED CONTINUOUS GLUCOSE MONITORING DEVICE: ICD-10-CM

## 2025-03-12 DIAGNOSIS — E11.65 TYPE 2 DIABETES MELLITUS WITH HYPERGLYCEMIA, WITH LONG-TERM CURRENT USE OF INSULIN: Primary | ICD-10-CM

## 2025-03-12 DIAGNOSIS — I10 PRIMARY HYPERTENSION: ICD-10-CM

## 2025-03-12 DIAGNOSIS — Z46.81 INSULIN PUMP TITRATION: ICD-10-CM

## 2025-03-12 DIAGNOSIS — E66.812 CLASS 2 SEVERE OBESITY DUE TO EXCESS CALORIES WITH SERIOUS COMORBIDITY AND BODY MASS INDEX (BMI) OF 37.0 TO 37.9 IN ADULT: ICD-10-CM

## 2025-03-12 LAB
EXPIRATION DATE: ABNORMAL
GLUCOSE BLDC GLUCOMTR-MCNC: 173 MG/DL (ref 70–99)
HBA1C MFR BLD: 9.3 % (ref 4.5–5.7)
Lab: ABNORMAL

## 2025-03-12 PROCEDURE — 99214 OFFICE O/P EST MOD 30 MIN: CPT

## 2025-03-12 PROCEDURE — 95251 CONT GLUC MNTR ANALYSIS I&R: CPT

## 2025-03-12 PROCEDURE — 83036 HEMOGLOBIN GLYCOSYLATED A1C: CPT

## 2025-03-12 PROCEDURE — 99213 OFFICE O/P EST LOW 20 MIN: CPT | Performed by: PHYSICIAN ASSISTANT

## 2025-03-12 PROCEDURE — G0463 HOSPITAL OUTPT CLINIC VISIT: HCPCS

## 2025-03-12 PROCEDURE — 82948 REAGENT STRIP/BLOOD GLUCOSE: CPT

## 2025-03-12 RX ORDER — AMLODIPINE BESYLATE 10 MG/1
10 TABLET ORAL DAILY
Qty: 90 TABLET | OUTPATIENT
Start: 2025-03-12

## 2025-03-12 RX ORDER — OMEPRAZOLE 20 MG/1
20 CAPSULE, DELAYED RELEASE ORAL DAILY
Qty: 90 CAPSULE | Refills: 1 | Status: SHIPPED | OUTPATIENT
Start: 2025-03-12

## 2025-03-12 RX ORDER — LOSARTAN POTASSIUM 100 MG/1
100 TABLET ORAL DAILY
Qty: 30 TABLET | Refills: 1 | Status: SHIPPED | OUTPATIENT
Start: 2025-03-12

## 2025-03-12 RX ORDER — LEVOTHYROXINE SODIUM 25 UG/1
25 TABLET ORAL
Qty: 30 TABLET | Refills: 1 | Status: SHIPPED | OUTPATIENT
Start: 2025-03-12

## 2025-03-12 RX ORDER — AMLODIPINE BESYLATE 10 MG/1
10 TABLET ORAL DAILY
Qty: 30 TABLET | Refills: 1 | Status: SHIPPED | OUTPATIENT
Start: 2025-03-12

## 2025-03-12 RX ORDER — PIOGLITAZONE 45 MG/1
45 TABLET ORAL DAILY
Qty: 30 TABLET | Refills: 2 | Status: SHIPPED | OUTPATIENT
Start: 2025-03-12

## 2025-03-12 RX ORDER — LOSARTAN POTASSIUM 100 MG/1
100 TABLET ORAL DAILY
Qty: 90 TABLET | OUTPATIENT
Start: 2025-03-12

## 2025-03-12 NOTE — PROGRESS NOTES
Chief Complaint  Hypertension    Subjective          Amariils Vinson presents to [facility]  History of Present Illness  HTN: pt denies chest pain, palpitations, unilateral weakness, slurred speech  Pt states she does not feel lisinopril helps bp at all any more  Taking daily    DM: was seen by endo today, restarted actos    Past Medical History:   Diagnosis Date    Anxiety     COVID 08/07/2022    NO CURRENT SYMPTOMS    Diabetes mellitus     Foot pain, left     Hypertension     Migraines         Past Surgical History:   Procedure Laterality Date    FOOT SURGERY      DISTAL LEFT FIRST METATARSAL    HARDWARE REMOVAL Left 8/26/2022    Procedure: HARDWARE REMOVAL;  left first metatarsal shaft;  Surgeon: Michel Jaffe DPM;  Location: [facility];  Service: Podiatry;  Laterality: Left;    ULNAR OSTEOPLASTY  2018        Current Outpatient Medications on File Prior to Visit   Medication Sig Dispense Refill    Alcohol Swabs pads Use 1 Pad Daily. 100 each 1    aspirin-acetaminophen-caffeine (EXCEDRIN MIGRAINE) 250-250-65 MG per tablet Take  by mouth.      atorvastatin (LIPITOR) 20 MG tablet Take 1 tablet by mouth Every Night. 30 tablet 0    chlorthalidone (HYGROTON) 25 MG tablet Take 1 tablet by mouth Daily. 30 tablet 0    Continuous Glucose Sensor (Dexcom G7 Sensor) misc Use 1 each Every 10 (Ten) Days for 180 days. 3 each 5    Insulin Lispro-aabc (Lyumjev KwikPen) 200 UNIT/ML solution pen-injector Inject 80 Units under the skin into the appropriate area as directed 3 (Three) Times a Day Before Meals for 180 days. 110 mL 1    Norethin-Eth Estrad-Fe Biphas (Lo Loestrin Fe) 1 MG-10 MCG / 10 MCG tablet Take 1 tablet by mouth Daily. 28 tablet 3    ondansetron ODT (ZOFRAN-ODT) 4 MG disintegrating tablet Place 1 tablet on the tongue Every 4 (Four) Hours As Needed for Nausea or Vomiting. 15 tablet 0    pioglitazone (Actos) 45 MG tablet Take 1 tablet by mouth Daily. 30  "tablet 2     No current facility-administered medications on file prior to visit.        Allergies   Allergen Reactions    Novolog [Insulin Aspart (Human Analog)] Hives       Social History     Tobacco Use   Smoking Status Never    Passive exposure: Never   Smokeless Tobacco Never          Objective   Vital Signs:   BP (!) 178/120 (BP Location: Left arm, Patient Position: Sitting)   Pulse 106   Temp 98.2 °F (36.8 °C) (Temporal)   Ht 157.5 cm (62.01\")   Wt 94.9 kg (209 lb 3.2 oz)   SpO2 98%   BMI 38.25 kg/m²     Physical Exam  Vitals reviewed.   Constitutional:       Appearance: Normal appearance.   HENT:      Head: Normocephalic and atraumatic.      Nose: Nose normal.      Mouth/Throat:      Mouth: Mucous membranes are moist.   Eyes:      Extraocular Movements: Extraocular movements intact.      Conjunctiva/sclera: Conjunctivae normal.      Pupils: Pupils are equal, round, and reactive to light.   Cardiovascular:      Rate and Rhythm: Normal rate and regular rhythm.   Pulmonary:      Effort: Pulmonary effort is normal.      Breath sounds: Normal breath sounds.   Abdominal:      General: Abdomen is flat. Bowel sounds are normal.      Palpations: Abdomen is soft.   Musculoskeletal:         General: Normal range of motion.   Neurological:      General: No focal deficit present.      Mental Status: She is alert and oriented to person, place, and time.   Psychiatric:         Mood and Affect: Mood normal.        Result Review :   The following data was reviewed by: Bonnie Shoemaker PA-C on 03/12/2025:  Common labs          2/12/2025    13:02 2/12/2025    13:15 2/26/2025    15:45 3/12/2025    13:38   Common Labs   Glucose   261     BUN   9     Creatinine   0.85     Sodium   137     Potassium   4.2     Chloride   99     Calcium   10.2     Albumin   3.7     Total Bilirubin   0.3     Alkaline Phosphatase   153     AST (SGOT)   145     ALT (SGPT)   61     WBC   13.03     Hemoglobin   15.1     Hematocrit   45.3   "   Platelets   392     Total Cholesterol   211     Triglycerides   397     HDL Cholesterol   23     LDL Cholesterol    118     Hemoglobin A1C 9.3    9.3    Microalbumin, Urine  10.2                     Assessment and Plan    Diagnoses and all orders for this visit:    1. Primary hypertension (Primary)  Assessment & Plan:  Discussed blood pressure elevation at today's visit. Risks of blood pressure elevation include death, heart attack, stroke, kidney disease, blindness. Low salt diet, increase exercise. Discussed importance of medication compliance and not missing doses. Will change Lisinopril to Losartan. Will also add amlodipine and refer to cardiology. We will monitor at follow up and adjust medications if still elevated. To er if chest pain, palpitations, vision loss, unilateral weakness, altered mental status. Pt understands and agrees with plan.      Orders:  -     Blood Pressure Device  -     Ambulatory Referral to Cardiology    2. Hypothyroidism, unspecified type  Comments:  reviewed recent labs. Will repeat thyroid labs in 6 wks    3. GERD without esophagitis  Comments:  Improved with PPI daily. Discussed long term risks but benefits of GERD control > long term risks at this time.    Other orders  -     losartan (Cozaar) 100 MG tablet; Take 1 tablet by mouth Daily.  Dispense: 30 tablet; Refill: 1  -     amLODIPine (NORVASC) 10 MG tablet; Take 1 tablet by mouth Daily.  Dispense: 30 tablet; Refill: 1  -     levothyroxine (SYNTHROID, LEVOTHROID) 25 MCG tablet; Take 1 tablet by mouth Every Morning.  Dispense: 30 tablet; Refill: 1  -     omeprazole (priLOSEC) 20 MG capsule; Take 1 capsule by mouth Daily.  Dispense: 90 capsule; Refill: 1        Follow Up   Return in about 2 weeks (around 3/26/2025).  Patient was given instructions and counseling regarding her condition or for health maintenance advice. Please see specific information pulled into the AVS if appropriate.

## 2025-03-12 NOTE — TELEPHONE ENCOUNTER
Omeprazole 20MG dr capsules    As long as you remain covered by your prescription drug plan and there are no changes to your  plan benefits, this request is approved from 03/12/2025 to 12/31/2039. When this approval  expires, please speak to your doctor about your treatment.

## 2025-03-13 NOTE — ASSESSMENT & PLAN NOTE
Discussed blood pressure elevation at today's visit. Risks of blood pressure elevation include death, heart attack, stroke, kidney disease, blindness. Low salt diet, increase exercise. Discussed importance of medication compliance and not missing doses. Will change Lisinopril to Losartan. Will also add amlodipine and refer to cardiology. We will monitor at follow up and adjust medications if still elevated. To er if chest pain, palpitations, vision loss, unilateral weakness, altered mental status. Pt understands and agrees with plan.

## 2025-03-19 ENCOUNTER — OFFICE VISIT (OUTPATIENT)
Dept: GASTROENTEROLOGY | Facility: CLINIC | Age: 20
End: 2025-03-19
Payer: COMMERCIAL

## 2025-03-19 ENCOUNTER — TELEPHONE (OUTPATIENT)
Dept: GASTROENTEROLOGY | Facility: CLINIC | Age: 20
End: 2025-03-19

## 2025-03-19 ENCOUNTER — PRIOR AUTHORIZATION (OUTPATIENT)
Age: 20
End: 2025-03-19
Payer: COMMERCIAL

## 2025-03-19 VITALS
OXYGEN SATURATION: 98 % | WEIGHT: 211.8 LBS | BODY MASS INDEX: 38.98 KG/M2 | HEIGHT: 62 IN | DIASTOLIC BLOOD PRESSURE: 127 MMHG | HEART RATE: 106 BPM | SYSTOLIC BLOOD PRESSURE: 169 MMHG

## 2025-03-19 DIAGNOSIS — K59.04 CHRONIC IDIOPATHIC CONSTIPATION: ICD-10-CM

## 2025-03-19 DIAGNOSIS — K76.0 FATTY LIVER: ICD-10-CM

## 2025-03-19 DIAGNOSIS — E66.812 CLASS 2 SEVERE OBESITY WITH SERIOUS COMORBIDITY AND BODY MASS INDEX (BMI) OF 38.0 TO 38.9 IN ADULT, UNSPECIFIED OBESITY TYPE: ICD-10-CM

## 2025-03-19 DIAGNOSIS — R10.84 GENERALIZED ABDOMINAL PAIN: ICD-10-CM

## 2025-03-19 DIAGNOSIS — E66.01 CLASS 2 SEVERE OBESITY WITH SERIOUS COMORBIDITY AND BODY MASS INDEX (BMI) OF 38.0 TO 38.9 IN ADULT, UNSPECIFIED OBESITY TYPE: ICD-10-CM

## 2025-03-19 DIAGNOSIS — R94.5 ABNORMAL RESULTS OF LIVER FUNCTION STUDIES: Primary | ICD-10-CM

## 2025-03-19 DIAGNOSIS — R63.0 DECREASED APPETITE: ICD-10-CM

## 2025-03-19 DIAGNOSIS — R11.0 NAUSEA: ICD-10-CM

## 2025-03-19 DIAGNOSIS — R12 HEARTBURN: ICD-10-CM

## 2025-03-19 RX ORDER — DOCUSATE SODIUM 100 MG/1
100 CAPSULE, LIQUID FILLED ORAL 2 TIMES DAILY
Qty: 60 CAPSULE | Refills: 3 | Status: SHIPPED | OUTPATIENT
Start: 2025-03-19

## 2025-03-19 NOTE — PATIENT INSTRUCTIONS
Fatty Liver Disease (Steatotic Liver Disease): What to Know    Your liver is an organ with many jobs. It makes proteins and helps change food into energy. It also gets rid of harmful things in your blood and absorbs vitamins from food.  Fatty liver disease happens when too much fat builds up in your liver cells. It's also called steatotic liver disease.  In many cases, fatty liver disease doesn't cause symptoms. But over time, it can cause irritation and swelling. This can lead to other liver problems, such as:  Cirrhosis, or scarring of the liver.  Liver cancer.  Liver failure.  What are the causes?  Fatty liver disease may be caused by:  Being overweight.  Having:  High cholesterol.  High blood pressure.  Cushing syndrome.  Not getting enough nutrients in your diet.  Other causes include:  Certain drugs.  Poisons.  Some infections caused by a germ called a virus.  What increases the risk?  You're more likely to get fatty liver disease if:  You drink alcohol.  You're overweight.  You have diabetes.  You have hepatitis.  You have a high triglyceride level.  You're pregnant.  What are the signs or symptoms?  You may not have symptoms. If you do, they may include:  Feeling weak and tired.  Losing weight.  Feeling like you may throw up.  Throwing up.  Jaundice. This is when your skin or the white parts of your eyes turn yellow.  Swelling in your belly or legs.  Tenderness in the right-upper part of your belly.  How is this diagnosed?  Fatty liver disease may be diagnosed based on your medical history and an exam. You may also need tests. These may include:  Blood tests.  An ultrasound.  A CT scan.  An MRI.  A biopsy. This is when a small piece of tissue is removed from your liver for testing.  How is this treated?  Fatty liver disease is often caused by other conditions. You may need to take medicines and make changes to your daily life. These changes may help you manage conditions, such as:  Alcohol use disorder. This  is a condition where you may not be able to stop drinking.  High cholesterol.  Diabetes.  Being overweight.  Follow these instructions at home:  Eat healthy. Work with your health care provider or an expert in healthy eating called a dietitian. They can help you make an eating plan.  Get enough exercise.  This can help you lose weight. It can also help you manage your cholesterol and diabetes.  Talk to your provider about an exercise plan. Ask what things are best for you to do.  Do not drink alcohol. If you have trouble quitting, ask your provider for help.  Take your medicines only as told.  Keep all follow-up visits. Your provider will check if you're getting better.  Contact a health care provider if:  You can't control your blood sugar. This is extra important if you have diabetes.  You have a fever.  You have swelling in your belly or legs.  You have belly pain.  You have jaundice.  You feel like you may throw up.  You throw up.  Get help right away if:  You throw up, and it looks like:  Bright red blood.  Coffee grounds.  You throw up something that looks like coffee ground.  Your poop looks bloody or black.  You get confused.  These symptoms may be an emergency. Call 911 right away.  Do not wait to see if the symptoms will go away.  Do not drive yourself to the hospital.  This information is not intended to replace advice given to you by your health care provider. Make sure you discuss any questions you have with your health care provider.  Document Revised: 06/06/2024 Document Reviewed: 06/06/2024  [employer] Patient Education © 2024 [employer]

## 2025-03-19 NOTE — TELEPHONE ENCOUNTER
Sent cardiac clearance to Kacy GARCIA. Pt has a cardiac appt on 3.28.25. Pt is schedule for an EGD on  4.22.25.

## 2025-03-19 NOTE — PROGRESS NOTES
Patient Name: Amarilis Vinson   Visit Date: 03/19/2025   Patient ID: [id number]  Provider: JOSE Patel    Sex: female  Location:  Location Address:  Location Phone: [phone]  [place] [id number] [phone]    YOB: 2005  Age: 20 y.o.   Primary Care Provider Ruthie Segal APRN      Referring Provider: Bonnie Shoemaker PA-C        Chief Complaint  Elevated Hepatic Enzymes (Patient has had recent labs and imaging and was diagnosed with fatty liver. Patient had a 1.4 cm cyst in the hepatic lob on the US. Patient has never seen a gastro provider before. Patient denies any alcohol consumption. Patient has generalized abdominal pain weekly. Patient goes between diarrhea and constipation weekly, but denies any OTC medication to help with either.)    History of Present Illness  History of Present Illness  The patient is a female who presents with elevated liver enzymes.    She reports experiencing abdominal discomfort, which she describes as an uneasy relationship with her stomach. This discomfort manifests as a lack of appetite on certain days, while on others, she experiences sharp, localized pain that alternates between the right and left sides of her abdomen. She has been experiencing this abdominal pain for a long time. She also reports episodes of nausea and vomiting, although these have been less frequent recently due to the use of Zofran. She has been on omeprazole for a short duration to manage severe heartburn, which she finds effective when taken daily.    She reports no alcohol consumption.   She has been struggling with weight loss since middle school, despite discontinuing medications prescribed by her psychiatrist in 12/2024, which had led to weight gain. She has discussed weight loss medications with her primary care physician but is hesitant to use Ozempic or Mounjaro due to past experiences of hematemesis. She attempts to adhere to a diabetic diet, but financial  constraints limit her food choices. She consumes Novalact skim milk, which she finds easier on her stomach, and drinks a lot of milk and water. She has been informed that she has a liver cyst and is concerned about its potential impact on her health.    She has been dealing with blood pressure issues since she was 10 years old, with readings as high as 200/170. She has been referred to cardiology but has not yet scheduled an appointment. She was asymptomatic today.    She has been experiencing bowel irregularities, alternating between constipation and diarrhea, with no presence of blood in the stool. She has not attempted to use stool softeners due to fear of inducing diarrhea. She typically has a bowel movement daily, with stools ranging [place] # 6 to 7 during episodes of diarrhea and type 1 during periods of constipation.    She has been diagnosed with type 2 diabetes with type 1 features, as she has been told her pancreas is non-functional.  She is on an insulin pump.    Ultrasound liver 3/4/2025: Hepatomegaly with increased echogenicity suggesting steatosis, 1.4 cm probable cyst in the liver-right hepatic lobe  Liver enzymes have been increasing, September AST 78, up to 145 in February, ALT 55 up to 61, alk phos 115 up to 153    Supplemental Information  She has a brain cyst and continues to experience migraines and fainting spells. She has been experiencing constant migraines. She has been experiencing random fainting spells, which have made it difficult for her to perform daily activities such as showering without assistance. She is currently seeking disability benefits.    SOCIAL HISTORY  She reports no alcohol intake.    MEDICATIONS  Current: insulin, Zofran, omeprazole      Past Medical History:   Diagnosis Date    Anxiety     Coronary artery disease     COVID 08/07/2022    NO CURRENT SYMPTOMS    Diabetes mellitus     Fatty liver     Foot pain, left     GERD (gastroesophageal reflux disease)     Graves  "disease     Hyperlipidemia     Hypertension     Migraines        Past Surgical History:   Procedure Laterality Date    FOOT SURGERY      DISTAL LEFT FIRST METATARSAL    HARDWARE REMOVAL Left 8/26/2022    Procedure: HARDWARE REMOVAL;  left first metatarsal shaft;  Surgeon: Michel Jaffe DPM;  [facility] MAIN OR;  Service: Podiatry;  Laterality: Left;    ULNAR OSTEOPLASTY  2018       Allergies   Allergen Reactions    Novolog [Insulin Aspart (Human Analog)] Hives       Family History   Adopted: Yes   Problem Relation Age of Onset    Alcohol abuse Mother         Bio mother    Alcohol abuse Father         Bio dad    Diabetes Father     Colon cancer Neg Hx         Social History     Tobacco Use    Smoking status: Never     Passive exposure: Never    Smokeless tobacco: Never   Vaping Use    Vaping status: Never Used   Substance Use Topics    Alcohol use: Not Currently    Drug use: Not Currently       Objective     Vital Signs:   BP (!) 169/127 (BP Location: Left arm, Patient Position: Sitting, Cuff Size: Adult)   Pulse 106   Ht 157.5 cm (62.01\")   Wt 96.1 kg (211 lb 12.8 oz)   SpO2 98%   BMI 38.73 kg/m²       Physical Exam  Constitutional:       General: The patient is not in acute distress.     Appearance: Normal appearance.   HENT:      Head: Normocephalic and atraumatic.      Nose: Nose normal.   Pulmonary:      Effort: Pulmonary effort is normal. No respiratory distress.   Abdominal:      General: Abdomen is flat.      Palpations: Abdomen is soft. There is no mass.      Tenderness: There is no abdominal tenderness. There is no guarding.   Musculoskeletal:      Cervical back: Neck supple.      Right lower leg: No edema.      Left lower leg: No edema.   Skin:     General: Skin is warm and dry.   Neurological:      General: No focal deficit present.      Mental Status: The patient is alert and oriented to person, place, and time.      Gait: Gait normal.   Psychiatric:         Mood and Affect: Mood " normal.         Speech: Speech normal.         Behavior: Behavior normal.         Thought Content: Thought content normal.     Result Review :   The following data was reviewed by: JOSE Patel on 03/19/2025:    CBC w/diff          5/14/2024    12:09 8/14/2024    10:41 2/26/2025    15:45   CBC w/Diff   WBC 8.22  10.41  13.03    RBC 4.78  4.83  5.23    Hemoglobin 13.9  14.3  15.1    Hematocrit 41.2  42.4  45.3    MCV 86.2  87.8  86.6    MCH 29.1  29.6  28.9    MCHC 33.7  33.7  33.3    RDW 12.0  12.7  12.8    Platelets 299  311  392    Neutrophil Rel % 54.6  60.3  63.9    Immature Granulocyte Rel % 0.5  0.6  0.7    Lymphocyte Rel % 36.7  31.1  28.2    Monocyte Rel % 5.5  5.9  5.3    Eosinophil Rel % 2.1  1.7  1.5    Basophil Rel % 0.6  0.4  0.4      CMP          9/25/2024    12:10 10/25/2024    14:27 2/26/2025    15:45   CMP   Glucose 281  164  261    BUN 11  19  9    Creatinine 0.67  0.84  0.85    EGFR 127.7  101.5  99.5    Sodium 132  136  137    Potassium 4.1  3.6  4.2    Chloride 95  96  99    Calcium 10.5  10.5  10.2    Total Protein 7.8  7.7  7.5    Albumin 4.6  4.3  3.7    Globulin 3.2  3.4  3.8    Total Bilirubin <0.2  <0.2  0.3    Alkaline Phosphatase 115  115  153    AST (SGOT) 78  109  145    ALT (SGPT) 55  52  61    Albumin/Globulin Ratio 1.4  1.3  1.0    BUN/Creatinine Ratio 16.4  22.6  10.6    Anion Gap 15.6  19.3  14.3        Liver Workup   Ceruloplasmin   Date Value Ref Range Status   07/18/2018 23 20 - 43 mg/dL Final     Comment:     REFERENCE INTERVAL: Ceruloplasmin  Access complete set of age- and/or gender-specific reference  intervals for this test in the [facility] Test Directory  ([email or web address]).  Performed by [facility],  [address] [place] [address] [phone]  [email or web address], Dennis Hudson MD - Lab. Director     Ferritin   Date Value Ref Range Status   01/07/2021 16 10 - 200 ng/mL Final     Comment:     <10 ng/ml usually associated with Iron Deficiency  "Anemia.  Above normal range levels may be due to Hepatic and/or  Chronic Inflammatory Disease.       Acute HEP Panel No results found for: \"HEPBSAG\", \"HEPAIGM\", \"HEPBIGMCORE\", \"HEPCVIRUSABY\"            Assessment and Plan    Diagnoses and all orders for this visit:    1. Abnormal results of liver function studies (Primary)  -     AFP Tumor Marker; Future  -     Alpha - 1 - Antitrypsin Phenotype; Future  -     MONTY; Future  -     Hepatic Function Panel; Future  -     Hepatitis Panel, Acute; Future  -     Iron Profile; Future  -     Ferritin; Future  -     Copper, Serum; Future  -     Protime-INR; Future  -     Ceruloplasmin; Future  -     Mitochondrial Antibodies, M2; Future  -     Anti-Smooth Muscle Antibody Titer; Future  -     RENETTA + PE; Future    2. Decreased appetite  -     NM Gastric Emptying; Future  -     Case Request; Standing  -     Case Request    3. Nausea  -     NM Gastric Emptying; Future  -     Case Request; Standing  -     Case Request    4. Generalized abdominal pain  -     Case Request; Standing  -     Case Request    5. Chronic idiopathic constipation    6. Heartburn  -     Case Request; Standing  -     Case Request    7. Fatty liver    8. Class 2 severe obesity with serious comorbidity and body mass index (BMI) of 38.0 to 38.9 in adult, unspecified obesity type    Other orders  -     Follow Anesthesia Guidelines / Protocol; Standing  -     Follow Anesthesia Guidelines / Protocol; Future  -     Verify NPO; Standing  -     docusate sodium (Colace) 100 MG capsule; Take 1 capsule by mouth 2 (Two) Times a Day.  Dispense: 60 capsule; Refill: 3          Assessment & Plan  1. Elevated liver enzymes/fatty liver.  Her elevated liver enzymes may be attributed to fatty liver disease, a common comorbidity in diabetic patients. The presence of a liver cyst is not a cause for concern at this stage. She has been advised to adopt a healthy weight loss regimen, emphasizing the importance of not skipping meals. A " low-sugar diet has been recommended, including the avoidance of regular sodas, juices, high fructose corn syrups, sweet tea, and Gatorade. She has been encouraged to consume 2 to 4 cups of coffee daily and to limit her intake of pasta, rice, and potatoes. A Mediterranean diet has been suggested as a healthier alternative. She has been reassured that she can continue to consume Fairlife skim milk, provided the sugar and carbohydrate content is not excessively high. A comprehensive liver workup will be conducted to identify potential causes of the elevated liver enzymes. An upper endoscopy will be performed to investigate the persistent heartburn and intermittent abdominal pain.     2. Nausea and abd pain  A gastric emptying scan will also be conducted to assess for delayed gastric emptying, a common issue in diabetic patients that can lead to constipation, decreased appetite, and nausea.  R/b discussed regarding EGD, and cardio clearance will be requested.     3. Hypertension.  Her blood pressure readings were significantly elevated during this visit, with a diastolic reading of 127.  She reports being referred to cardiology for this and this has been an ongoing problem for her since middle school.  She reports not taking her medications yet today.  She has been advised to seek immediate medical attention at the emergency room if she experiences any chest pain or visual disturbances. She has been instructed to monitor her blood pressure at home, particularly a few hours post-medication, to ensure it is decreasing. She has been reminded to limit her salt intake.    4. Bowel irregularities.  Her diarrhea could be a symptom of overflow constipation. She has been advised to start taking Colace, a stool softener, twice daily. She has been informed that she may experience loose stools initially, but if she is constipated, it is important to allow the stool to pass. If she continues to experience diarrhea after the second  week of Colace treatment, she has been instructed to inform us.                Follow Up   Return for follow up after procedure.    Patient or patient representative verbalized consent for the use of Ambient Listening during the visit with  JOSE Patel for chart documentation. 3/19/2025  12:01 EDT    Patient was given instructions and counseling regarding her condition or for health maintenance advice. Please see specific information pulled into the AVS if appropriate.

## 2025-03-19 NOTE — TELEPHONE ENCOUNTER
3/19/2025    Dear Kacy GARCIA ,      Patient Name: Amarilis Vinson  : 2005      This patient is waiting to have a Colonoscopy and/or Esophagogastroduodenoscopy which I will perform at [facility] on 25. Please respond to this request noting your recommendations regarding clearance from a Cardiac  standpoint.  You may contact our office at [phone] Option 2 with any questions. I appreciate your prompt response in this matter. Please return this form to our office as soon as possible to [phone].    ____ I approve my patient from a Cardiac  standpoint    ____ I do NOT approve my patient from a Cardiac  standpoint at this time    Please inform our office if the patient requires additional follow-up from your office prior to scheduled procedure date.      Please specify clearance expiration date:____________________________________      Approving physician name (please print): _____________________________________________      Approving physician signature: ________________________________ Date:________________  Sincerely,  [facility] - Gastroenterology   Dr. Reno           Please fax approval or denial to our office as soon as possible.

## 2025-03-21 ENCOUNTER — PATIENT ROUNDING (BHMG ONLY) (OUTPATIENT)
Dept: GASTROENTEROLOGY | Facility: CLINIC | Age: 20
End: 2025-03-21
Payer: COMMERCIAL

## 2025-03-21 NOTE — PROGRESS NOTES
"3/21/2025      Hello, may I speak with Amarilis Vinson     My name is Joselito. I am calling from [facility]. I show that you had a recent visit with JOSE Barahona.    Before we get started may I verify your date of birth? 2005    I am calling to officially welcome you to our practice and ask about your recent visit. Is this a good time to talk? Yes    Tell me about your visit with us. What things went well? \"Everything went well with my visit with Cora. She really listened to my concerns and took me seriously.\"     We strive to ensure that we protect your safety and privacy. Is there anything we could have done to improve this during your visit? No       We're always looking for ways to make our patients' experiences even better. Do you have recommendations on ways we may improve? No    Overall were you satisfied with your first visit to our practice? Yes    I appreciate you taking the time to speak with me today. Is there anything else I can do for you? I provided patient with scheduling's number per her request so she can schedule her gastric emptying study    I am glad to hear that you had a very good visit and I appreciate you taking the time to provide feedback on this call. We would greatly appreciate you filling out a survey if you receive one in the mail, email or text. This is a great opportunity to provide any additional feedback that you may think of after this call as well.       Thank you, and have a great day.  "

## 2025-03-27 ENCOUNTER — APPOINTMENT (OUTPATIENT)
Dept: GENERAL RADIOLOGY | Facility: HOSPITAL | Age: 20
End: 2025-03-27
Payer: COMMERCIAL

## 2025-03-27 ENCOUNTER — APPOINTMENT (OUTPATIENT)
Dept: CT IMAGING | Facility: HOSPITAL | Age: 20
End: 2025-03-27
Payer: COMMERCIAL

## 2025-03-27 ENCOUNTER — HOSPITAL ENCOUNTER (EMERGENCY)
Facility: HOSPITAL | Age: 20
Discharge: HOME | End: 2025-03-28
Attending: EMERGENCY MEDICINE
Payer: COMMERCIAL

## 2025-03-27 DIAGNOSIS — I10 HYPERTENSION, UNSPECIFIED TYPE: ICD-10-CM

## 2025-03-27 DIAGNOSIS — R55 SYNCOPE AND COLLAPSE: Primary | ICD-10-CM

## 2025-03-27 DIAGNOSIS — S00.93XA CONTUSION OF HEAD, UNSPECIFIED PART OF HEAD, INITIAL ENCOUNTER: ICD-10-CM

## 2025-03-27 DIAGNOSIS — R79.89 ELEVATED LFTS: ICD-10-CM

## 2025-03-27 LAB
ALBUMIN SERPL-MCNC: 4.3 G/DL (ref 3.5–5.2)
ALBUMIN/GLOB SERPL: 1.2 G/DL
ALP SERPL-CCNC: 150 U/L (ref 39–117)
ALT SERPL W P-5'-P-CCNC: 69 U/L (ref 1–33)
ANION GAP SERPL CALCULATED.3IONS-SCNC: 13.1 MMOL/L (ref 5–15)
AST SERPL-CCNC: 108 U/L (ref 1–32)
BASOPHILS # BLD AUTO: 0.08 10*3/MM3 (ref 0–0.2)
BASOPHILS NFR BLD AUTO: 0.6 % (ref 0–1.5)
BILIRUB SERPL-MCNC: 0.3 MG/DL (ref 0–1.2)
BUN SERPL-MCNC: 8 MG/DL (ref 6–20)
BUN/CREAT SERPL: 11.3 (ref 7–25)
CALCIUM SPEC-SCNC: 9.7 MG/DL (ref 8.6–10.5)
CHLORIDE SERPL-SCNC: 101 MMOL/L (ref 98–107)
CO2 SERPL-SCNC: 22.9 MMOL/L (ref 22–29)
CREAT SERPL-MCNC: 0.71 MG/DL (ref 0.57–1)
DEPRECATED RDW RBC AUTO: 36.6 FL (ref 37–54)
EGFRCR SERPLBLD CKD-EPI 2021: 123.5 ML/MIN/1.73
EOSINOPHIL # BLD AUTO: 0.22 10*3/MM3 (ref 0–0.4)
EOSINOPHIL NFR BLD AUTO: 1.6 % (ref 0.3–6.2)
ERYTHROCYTE [DISTWIDTH] IN BLOOD BY AUTOMATED COUNT: 12.4 % (ref 12.3–15.4)
GLOBULIN UR ELPH-MCNC: 3.6 GM/DL
GLUCOSE SERPL-MCNC: 185 MG/DL (ref 65–99)
HCT VFR BLD AUTO: 44.8 % (ref 34–46.6)
HGB BLD-MCNC: 15.2 G/DL (ref 12–15.9)
HOLD SPECIMEN: NORMAL
HOLD SPECIMEN: NORMAL
IMM GRANULOCYTES # BLD AUTO: 0.06 10*3/MM3 (ref 0–0.05)
IMM GRANULOCYTES NFR BLD AUTO: 0.4 % (ref 0–0.5)
LYMPHOCYTES # BLD AUTO: 4.14 10*3/MM3 (ref 0.7–3.1)
LYMPHOCYTES NFR BLD AUTO: 30.4 % (ref 19.6–45.3)
MAGNESIUM SERPL-MCNC: 2.1 MG/DL (ref 1.6–2.6)
MCH RBC QN AUTO: 27.8 PG (ref 26.6–33)
MCHC RBC AUTO-ENTMCNC: 33.9 G/DL (ref 31.5–35.7)
MCV RBC AUTO: 82.1 FL (ref 79–97)
MONOCYTES # BLD AUTO: 0.72 10*3/MM3 (ref 0.1–0.9)
MONOCYTES NFR BLD AUTO: 5.3 % (ref 5–12)
NEUTROPHILS NFR BLD AUTO: 61.7 % (ref 42.7–76)
NEUTROPHILS NFR BLD AUTO: 8.42 10*3/MM3 (ref 1.7–7)
NRBC BLD AUTO-RTO: 0 /100 WBC (ref 0–0.2)
PLATELET # BLD AUTO: 371 10*3/MM3 (ref 140–450)
PMV BLD AUTO: 9.8 FL (ref 6–12)
POTASSIUM SERPL-SCNC: 4.3 MMOL/L (ref 3.5–5.2)
PROT SERPL-MCNC: 7.9 G/DL (ref 6–8.5)
QT INTERVAL: 360 MS
QTC INTERVAL: 449 MS
RBC # BLD AUTO: 5.46 10*6/MM3 (ref 3.77–5.28)
SODIUM SERPL-SCNC: 137 MMOL/L (ref 136–145)
TROPONIN T SERPL HS-MCNC: <6 NG/L
WBC NRBC COR # BLD AUTO: 13.64 10*3/MM3 (ref 3.4–10.8)
WHOLE BLOOD HOLD COAG: NORMAL
WHOLE BLOOD HOLD SPECIMEN: NORMAL

## 2025-03-27 PROCEDURE — 70450 CT HEAD/BRAIN W/O DYE: CPT

## 2025-03-27 PROCEDURE — 83735 ASSAY OF MAGNESIUM: CPT | Performed by: EMERGENCY MEDICINE

## 2025-03-27 PROCEDURE — 85025 COMPLETE CBC W/AUTO DIFF WBC: CPT

## 2025-03-27 PROCEDURE — 80053 COMPREHEN METABOLIC PANEL: CPT | Performed by: EMERGENCY MEDICINE

## 2025-03-27 PROCEDURE — 84702 CHORIONIC GONADOTROPIN TEST: CPT | Performed by: NURSE PRACTITIONER

## 2025-03-27 PROCEDURE — 81001 URINALYSIS AUTO W/SCOPE: CPT | Performed by: NURSE PRACTITIONER

## 2025-03-27 PROCEDURE — 84484 ASSAY OF TROPONIN QUANT: CPT | Performed by: EMERGENCY MEDICINE

## 2025-03-27 PROCEDURE — 99285 EMERGENCY DEPT VISIT HI MDM: CPT

## 2025-03-27 PROCEDURE — 82077 ASSAY SPEC XCP UR&BREATH IA: CPT | Performed by: NURSE PRACTITIONER

## 2025-03-27 PROCEDURE — 71045 X-RAY EXAM CHEST 1 VIEW: CPT

## 2025-03-27 PROCEDURE — 36415 COLL VENOUS BLD VENIPUNCTURE: CPT

## 2025-03-27 PROCEDURE — 93005 ELECTROCARDIOGRAM TRACING: CPT | Performed by: EMERGENCY MEDICINE

## 2025-03-27 PROCEDURE — 80307 DRUG TEST PRSMV CHEM ANLYZR: CPT | Performed by: NURSE PRACTITIONER

## 2025-03-27 RX ORDER — SODIUM CHLORIDE 0.9 % (FLUSH) 0.9 %
10 SYRINGE (ML) INJECTION AS NEEDED
Status: DISCONTINUED | OUTPATIENT
Start: 2025-03-27 | End: 2025-03-28 | Stop reason: HOSPADM

## 2025-03-28 VITALS
RESPIRATION RATE: 16 BRPM | DIASTOLIC BLOOD PRESSURE: 108 MMHG | WEIGHT: 215.83 LBS | HEIGHT: 62 IN | TEMPERATURE: 98.6 F | OXYGEN SATURATION: 96 % | BODY MASS INDEX: 39.72 KG/M2 | SYSTOLIC BLOOD PRESSURE: 169 MMHG | HEART RATE: 94 BPM

## 2025-03-28 LAB
AMPHET+METHAMPHET UR QL: NEGATIVE
AMPHETAMINES UR QL: NEGATIVE
BACTERIA UR QL AUTO: ABNORMAL /HPF
BARBITURATES UR QL SCN: NEGATIVE
BENZODIAZ UR QL SCN: NEGATIVE
BILIRUB UR QL STRIP: NEGATIVE
BUPRENORPHINE SERPL-MCNC: NEGATIVE NG/ML
CANNABINOIDS SERPL QL: NEGATIVE
CLARITY UR: CLEAR
COCAINE UR QL: NEGATIVE
COLOR UR: YELLOW
ETHANOL BLD-MCNC: <10 MG/DL (ref 0–10)
ETHANOL UR QL: <0.01 %
FENTANYL UR-MCNC: NEGATIVE NG/ML
GEN 5 1HR TROPONIN T REFLEX: <6 NG/L
GLUCOSE UR STRIP-MCNC: ABNORMAL MG/DL
HCG INTACT+B SERPL-ACNC: <0.5 MIU/ML
HGB UR QL STRIP.AUTO: NEGATIVE
HYALINE CASTS UR QL AUTO: ABNORMAL /LPF
KETONES UR QL STRIP: NEGATIVE
LEUKOCYTE ESTERASE UR QL STRIP.AUTO: NEGATIVE
METHADONE UR QL SCN: NEGATIVE
NITRITE UR QL STRIP: NEGATIVE
OPIATES UR QL: NEGATIVE
OXYCODONE UR QL SCN: NEGATIVE
PCP UR QL SCN: NEGATIVE
PH UR STRIP.AUTO: 7.5 [PH] (ref 5–8)
PROT UR QL STRIP: ABNORMAL
RBC # UR STRIP: ABNORMAL /HPF
REF LAB TEST METHOD: ABNORMAL
SP GR UR STRIP: >1.03 (ref 1–1.03)
SQUAMOUS #/AREA URNS HPF: ABNORMAL /HPF
TRICYCLICS UR QL SCN: NEGATIVE
TROPONIN T NUMERIC DELTA: NORMAL
UROBILINOGEN UR QL STRIP: ABNORMAL
WBC # UR STRIP: ABNORMAL /HPF

## 2025-03-28 PROCEDURE — 84484 ASSAY OF TROPONIN QUANT: CPT | Performed by: EMERGENCY MEDICINE

## 2025-03-28 PROCEDURE — 96374 THER/PROPH/DIAG INJ IV PUSH: CPT

## 2025-03-28 PROCEDURE — 96375 TX/PRO/DX INJ NEW DRUG ADDON: CPT

## 2025-03-28 PROCEDURE — 25010000002 HYDRALAZINE PER 20 MG: Performed by: NURSE PRACTITIONER

## 2025-03-28 RX ORDER — HYDRALAZINE HYDROCHLORIDE 20 MG/ML
20 INJECTION INTRAMUSCULAR; INTRAVENOUS ONCE
Status: COMPLETED | OUTPATIENT
Start: 2025-03-28 | End: 2025-03-28

## 2025-03-28 RX ORDER — ACETAMINOPHEN 325 MG/1
975 TABLET ORAL ONCE
Status: COMPLETED | OUTPATIENT
Start: 2025-03-28 | End: 2025-03-28

## 2025-03-28 RX ADMIN — ACETAMINOPHEN 975 MG: 325 TABLET ORAL at 00:42

## 2025-03-28 RX ADMIN — HYDRALAZINE HYDROCHLORIDE 20 MG: 20 INJECTION INTRAMUSCULAR; INTRAVENOUS at 01:38

## 2025-03-28 RX ADMIN — METOPROLOL TARTRATE 5 MG: 1 INJECTION, SOLUTION INTRAVENOUS at 00:53

## 2025-03-28 NOTE — ED PROVIDER NOTES
Time: 11:18 PM EDT  Date of encounter:  3/27/2025  Independent Historian/Clinical History and Information was obtained by:   Patient and Family    History is limited by: Acuity of Condition    Chief Complaint: Fall      History of Present Illness:  Patient is a 20 y.o. year old female who presents to the emergency department for evaluation of fall that occurred at home.  Patient is lethargic upon arrival and most history is obtained from family.  Family states that they found her down by the bathtub and she states that she did fall.  She remembers going to the bathroom and trying to have a bowel movement as the last thing.  Patient is complaining of headache.  Patient was confused upon arrival but has not been able to answer questions appropriately.  Patient did have unresponsive pupils upon arrival, but this is since resolved as well.  Patient denies any history of head injuries or concussions.  (Bailey Seaver, APRN, FNP-C)  Patient reports feeling dizzy earlier today but otherwise has been well      Patient Care Team  Primary Care Provider: Ruthie Segal APRN    Past Medical History:     Allergies   Allergen Reactions    Novolog [Insulin Aspart (Human Analog)] Hives     Past Medical History:   Diagnosis Date    Anxiety     Coronary artery disease     COVID 08/07/2022    NO CURRENT SYMPTOMS    Diabetes mellitus     Fatty liver     Foot pain, left     GERD (gastroesophageal reflux disease)     Graves disease     Hyperlipidemia     Hypertension     Migraines      Past Surgical History:   Procedure Laterality Date    FOOT SURGERY      DISTAL LEFT FIRST METATARSAL    HARDWARE REMOVAL Left 8/26/2022    Procedure: HARDWARE REMOVAL;  left first metatarsal shaft;  Surgeon: Michel Jaffe DPM;  Location: [facility];  Service: Podiatry;  Laterality: Left;    ULNAR OSTEOPLASTY  2018     Family History   Adopted: Yes   Problem Relation Age of Onset    Alcohol abuse Mother         Bio mother    Alcohol abuse  Father         Bio dad    Diabetes Father     Colon cancer Neg Hx        Home Medications:  Prior to Admission medications    Medication Sig Start Date End Date Taking? Authorizing Provider   Alcohol Swabs pads Use 1 Pad Daily. 2/7/24   Jessica Russell APRN   amLODIPine (NORVASC) 10 MG tablet Take 1 tablet by mouth Daily. 3/12/25   Bonnie Shoemaker PA-C   aspirin-acetaminophen-caffeine (EXCEDRIN MIGRAINE) 250-250-65 MG per tablet Take  by mouth.    Provider, MD Edison   atorvastatin (LIPITOR) 20 MG tablet Take 1 tablet by mouth Every Night. 2/26/25   Bonnie Shoemaker PA-C   chlorthalidone (HYGROTON) 25 MG tablet Take 1 tablet by mouth Daily. 2/26/25   Bonnie Shoemaker PA-C   Continuous Glucose Sensor (Dexcom G7 Sensor) misc Use 1 each Every 10 (Ten) Days for 180 days. 2/12/25 8/11/25  Walter Tate APRN   docusate sodium (Colace) 100 MG capsule Take 1 capsule by mouth 2 (Two) Times a Day. 3/19/25   Cora Cruz APRN   Insulin Lispro-aabc (Lyumjev KwikPen) 200 UNIT/ML solution pen-injector Inject 80 Units under the skin into the appropriate area as directed 3 (Three) Times a Day Before Meals for 180 days. 10/7/24 4/5/25  Walter Tate APRN   levothyroxine (SYNTHROID, LEVOTHROID) 25 MCG tablet Take 1 tablet by mouth Every Morning. 3/12/25   Bonnie Shoemaker PA-C   losartan (Cozaar) 100 MG tablet Take 1 tablet by mouth Daily. 3/12/25   Bonnie Shoemaker PA-C   Norethin-Eth Estrad-Fe Biphas (Lo Loestrin Fe) 1 MG-10 MCG / 10 MCG tablet Take 1 tablet by mouth Daily. 9/9/24   Ruthie Segal APRN   omeprazole (priLOSEC) 20 MG capsule Take 1 capsule by mouth Daily. 3/12/25   Bonnie Shoemaker PA-C   ondansetron ODT (ZOFRAN-ODT) 4 MG disintegrating tablet Place 1 tablet on the tongue Every 4 (Four) Hours As Needed for Nausea or Vomiting. 10/25/24   Ruthie Segal APRN   pioglitazone (Actos) 45 MG tablet Take 1 tablet by mouth Daily. 3/12/25   Walter Tate APRN        Social History:  "  Social History     Tobacco Use    Smoking status: Never     Passive exposure: Never    Smokeless tobacco: Never   Vaping Use    Vaping status: Never Used   Substance Use Topics    Alcohol use: Not Currently    Drug use: Not Currently         Review of Systems:  Review of Systems   Unable to perform ROS: Acuity of condition   Neurological:  Positive for dizziness, syncope and headaches.        Physical Exam:  BP (!) 169/108   Pulse 87   Temp 98.6 °F (37 °C) (Oral)   Resp 16   Ht 157.5 cm (62.01\")   Wt 97.9 kg (215 lb 13.3 oz)   SpO2 96%   BMI 39.47 kg/m²     Physical Exam  Vitals and nursing note reviewed.   Constitutional:       General: She is not in acute distress.     Appearance: Normal appearance. She is obese. She is not toxic-appearing.   HENT:      Head: Normocephalic and atraumatic.      Right Ear: Tympanic membrane, ear canal and external ear normal.      Left Ear: Tympanic membrane, ear canal and external ear normal.      Nose: Nose normal.      Mouth/Throat:      Mouth: Mucous membranes are moist.   Eyes:      General: No scleral icterus.     Extraocular Movements: Extraocular movements intact.      Conjunctiva/sclera: Conjunctivae normal.      Pupils: Pupils are equal, round, and reactive to light.   Cardiovascular:      Rate and Rhythm: Normal rate and regular rhythm.      Heart sounds: Normal heart sounds.   Pulmonary:      Effort: Pulmonary effort is normal. No respiratory distress.      Breath sounds: Normal breath sounds.   Abdominal:      General: Bowel sounds are normal. There is no distension.      Palpations: Abdomen is soft.      Tenderness: There is no abdominal tenderness. There is no right CVA tenderness.   Musculoskeletal:         General: Normal range of motion.      Cervical back: Normal range of motion and neck supple. No tenderness.   Skin:     General: Skin is warm and dry.   Neurological:      General: No focal deficit present.      Mental Status: She is alert and oriented to " person, place, and time. Mental status is at baseline.      Cranial Nerves: No cranial nerve deficit.      Sensory: No sensory deficit.      Motor: No weakness.      Comments: Patient denies any current symptoms of mild headache   Psychiatric:         Mood and Affect: Mood normal.         Behavior: Behavior normal.                Medical Decision Making:      Comorbidities that affect care:    Coronary Artery Disease, Diabetes, Hypertension, Obesity    External Notes reviewed:    Previous Clinic Note: Patient last seen by GI on 19 March for nausea and constipation      The following orders were placed and all results were independently analyzed by me:  Orders Placed This Encounter   Procedures    CT Head Without Contrast    XR Chest 1 View    [place] Draw    Comprehensive Metabolic Panel    Magnesium    High Sensitivity Troponin T    CBC Auto Differential    Urinalysis With Microscopic If Indicated (No Culture) - Urine, Clean Catch    Urine Drug Screen - Urine, Clean Catch    hCG, Quantitative, Pregnancy    Ethanol    High Sensitivity Troponin T 1Hr    Fentanyl, Urine - Urine, Clean Catch    Urinalysis, Microscopic Only - Urine, Clean Catch    Ambulatory Referral to Cardiology    NPO Diet NPO Type: Strict NPO    Undress & Gown    Continuous Pulse Oximetry    Vital Signs    Orthostatic Blood Pressure    Oxygen Therapy- Nasal Cannula; Titrate 1-6 LPM Per SpO2; 90 - 95%    POC Glucose Once    ECG 12 Lead Syncope    Insert Peripheral IV    CBC & Differential    Green Top (Gel)    Lavender Top    Gold Top - SST    Light Blue Top       Medications Given in the Emergency Department:  Medications   sodium chloride 0.9 % flush 10 mL (has no administration in time range)   acetaminophen (TYLENOL) tablet 975 mg (975 mg Oral Given 3/28/25 0042)   metoprolol tartrate (LOPRESSOR) injection 5 mg (5 mg Intravenous Given 3/28/25 0053)   hydrALAZINE (APRESOLINE) injection 20 mg (20 mg Intravenous Given 3/28/25 0138)        ED  Course:    ED Course as of 03/28/25 0148   u Mar 27, 2025   2320 PROVIDER IN TRIAGE  Patient was evaluated by me in triage, Bailey Seaver, APRN, FNP-C.  Orders were placed and patient is currently awaiting final results and disposition.   [AS]   2355 Comprehensive Metabolic Panel(!)  Elevated LFTs and alk phos equipment if not decreased from 4 weeks ago [DS]   2356 WBC(!): 13.64  4 weeks ago white count was 13,000 [DS]   Fri Mar 28, 2025   0021 CT Head Without Contrast  No acute findings [DS]   0043 XR Chest 1 View  No acute findings [DS]      ED Course User Index  [AS] Seaver, Alyce B, APRN  [DS] Suzi Nicolas APRN       Labs:    Lab Results (last 24 hours)       Procedure Component Value Units Date/Time    CBC & Differential [[id number]]  (Abnormal) Collected: 03/27/25 2323    Specimen: Blood Updated: 03/27/25 2329    Narrative:      The following orders were created for panel order CBC & Differential.  Procedure                               Abnormality         Status                     ---------                               -----------         ------                     CBC Auto Differential[[id number]]        Abnormal            Final result                 Please view results for these tests on the individual orders.    Comprehensive Metabolic Panel [[id number]]  (Abnormal) Collected: 03/27/25 2323    Specimen: Blood from Arm, Right Updated: 03/27/25 2351     Glucose 185 mg/dL      BUN 8 mg/dL      Creatinine 0.71 mg/dL      Sodium 137 mmol/L      Potassium 4.3 mmol/L      Chloride 101 mmol/L      CO2 22.9 mmol/L      Calcium 9.7 mg/dL      Total Protein 7.9 g/dL      Albumin 4.3 g/dL      ALT (SGPT) 69 U/L      AST (SGOT) 108 U/L      Alkaline Phosphatase 150 U/L      Total Bilirubin 0.3 mg/dL      Globulin 3.6 gm/dL      A/G Ratio 1.2 g/dL      BUN/Creatinine Ratio 11.3     Anion Gap 13.1 mmol/L      eGFR 123.5 mL/min/1.73     Narrative:      GFR Categories in Chronic Kidney Disease (CKD)      GFR Category           GFR (mL/min/1.73)    Interpretation  G1                     90 or greater         Normal or high (1)  G2                      60-89                Mild decrease (1)  G3a                   45-59                Mild to moderate decrease  G3b                   30-44                Moderate to severe decrease  G4                    15-29                Severe decrease  G5                    14 or less           Kidney failure          (1)In the absence of evidence of kidney disease, neither GFR category G1 or G2 fulfill the criteria for CKD.    eGFR calculation 2021 CKD-EPI creatinine equation, which does not include race as a factor    Magnesium [[id number]]  (Normal) Collected: 03/27/25 2323    Specimen: Blood from Arm, Right Updated: 03/27/25 2351     Magnesium 2.1 mg/dL     High Sensitivity Troponin T [[id number]]  (Normal) Collected: 03/27/25 2323    Specimen: Blood from Arm, Right Updated: 03/27/25 2351     HS Troponin T <6 ng/L     Narrative:      High Sensitive Troponin T Reference Range:  <14.0 ng/L- Negative Female for AMI  <22.0 ng/L- Negative Male for AMI  >=14 - Abnormal Female indicating possible myocardial injury.  >=22 - Abnormal Male indicating possible myocardial injury.   Clinicians would have to utilize clinical acumen, EKG, Troponin, and serial changes to determine if it is an Acute Myocardial Infarction or myocardial injury due to an underlying chronic condition.         CBC Auto Differential [[id number]]  (Abnormal) Collected: 03/27/25 2323    Specimen: Blood Updated: 03/27/25 2329     WBC 13.64 10*3/mm3      RBC 5.46 10*6/mm3      Hemoglobin 15.2 g/dL      Hematocrit 44.8 %      MCV 82.1 fL      MCH 27.8 pg      MCHC 33.9 g/dL      RDW 12.4 %      RDW-SD 36.6 fl      MPV 9.8 fL      Platelets 371 10*3/mm3      Neutrophil % 61.7 %      Lymphocyte % 30.4 %      Monocyte % 5.3 %      Eosinophil % 1.6 %      Basophil % 0.6 %      Immature Grans % 0.4 %      Neutrophils, Absolute 8.42 10*3/mm3       Lymphocytes, Absolute 4.14 10*3/mm3      Monocytes, Absolute 0.72 10*3/mm3      Eosinophils, Absolute 0.22 10*3/mm3      Basophils, Absolute 0.08 10*3/mm3      Immature Grans, Absolute 0.06 10*3/mm3      nRBC 0.0 /100 WBC     hCG, Quantitative, Pregnancy [[id number]] Collected: 03/27/25 2323    Specimen: Blood from Arm, Right Updated: 03/28/25 0027     HCG Quantitative <0.50 mIU/mL     Narrative:      HCG Ranges by Gestational Age    Females - non-pregnant premenopausal   </= 1mIU/mL HCG  Females - postmenopausal               </= 7mIU/mL HCG    3 Weeks       5.4   -      72 mIU/mL  4 Weeks      10.2   -     708 mIU/mL  5 Weeks       217   -   8,245 mIU/mL  6 Weeks       152   -  32,177 mIU/mL  7 Weeks     4,059   - 153,767 mIU/mL  8 Weeks    31,366   - 149,094 mIU/mL  9 Weeks    59,109   - 135,901 mIU/mL  10 Weeks   44,186   - 170,409 mIU/mL  12 Weeks   27,107   - 201,615 mIU/mL  14 Weeks   24,302   -  93,646 mIU/mL  15 Weeks   12,540   -  69,747 mIU/mL  16 Weeks    8,904   -  55,332 mIU/mL  17 Weeks    8,240   -  51,793 mIU/mL  18 Weeks    9,649   -  55,271 mIU/mL      Ethanol [[id number]] Collected: 03/27/25 2323    Specimen: Blood from Arm, Right Updated: 03/28/25 0019     Ethanol <10 mg/dL      Ethanol % <0.010 %     Narrative:      Ethanol (Plasma)  <10 Essentially Negative    Toxic Concentrations           mg/dL    Flushing, slowing of reflexes    Impaired visual activity         Depression of CNS              >100  Possible Coma                  >300       Urinalysis With Microscopic If Indicated (No Culture) - Urine, Clean Catch [[id number]]  (Abnormal) Collected: 03/27/25 2355    Specimen: Urine, Clean Catch Updated: 03/28/25 0008     Color, UA Yellow     Appearance, UA Clear     pH, UA 7.5     Specific Gravity, UA >1.030     Glucose,  mg/dL (2+)     Ketones, UA Negative     Bilirubin, UA Negative     Blood, UA Negative     Protein, UA 30 mg/dL (1+)     Leuk Esterase, UA Negative      Nitrite, UA Negative     Urobilinogen, UA 1.0 E.U./dL    Urine Drug Screen - Urine, Clean Catch [[id number]]  (Normal) Collected: 03/27/25 2355    Specimen: Urine, Clean Catch Updated: 03/28/25 0012     THC, Screen, Urine Negative     Phencyclidine (PCP), Urine Negative     Cocaine Screen, Urine Negative     Methamphetamine, Ur Negative     Opiate Screen Negative     Amphetamine Screen, Urine Negative     Benzodiazepine Screen, Urine Negative     Tricyclic Antidepressants Screen Negative     Methadone Screen, Urine Negative     Barbiturates Screen, Urine Negative     Oxycodone Screen, Urine Negative     Buprenorphine, Screen, Urine Negative    Narrative:      Cutoff For Drugs Screened:    Amphetamines               500 ng/ml  Barbiturates               200 ng/ml  Benzodiazepines            150 ng/ml  Cocaine                    150 ng/ml  Methadone                  200 ng/ml  Opiates                    100 ng/ml  Phencyclidine               25 ng/ml  THC                         50 ng/ml  Methamphetamine            500 ng/ml  Tricyclic Antidepressants  300 ng/ml  Oxycodone                  100 ng/ml  Buprenorphine               10 ng/ml    The normal value for all drugs tested is negative. This report includes unconfirmed screening results, with the cutoff values listed, to be used for medical treatment purposes only.  Unconfirmed results must not be used for non-medical purposes such as employment or legal testing.  Clinical consideration should be applied to any drug of abuse test, particularly when unconfirmed results are used.      Fentanyl, Urine - Urine, Clean Catch [[id number]]  (Normal) Collected: 03/27/25 2355    Specimen: Urine, Clean Catch Updated: 03/28/25 0014     Fentanyl, Urine Negative    Narrative:      Negative Threshold:      Fentanyl 5 ng/mL     The normal value for the drug tested is negative. This report includes final unconfirmed screening results to be used for medical treatment purposes only.  Unconfirmed results must not be used for non-medical purposes such as employment or legal testing. Clinical consideration should be applied to any drug of abuse test, particularly when unconfirmed results are used.           Urinalysis, Microscopic Only - Urine, Clean Catch [[id number]]  (Abnormal) Collected: 03/27/25 2355    Specimen: Urine, Clean Catch Updated: 03/28/25 0008     RBC, UA 0-2 /HPF      WBC, UA 0-2 /HPF      Bacteria, UA Trace /HPF      Squamous Epithelial Cells, UA 0-2 /HPF      Hyaline Casts, UA None Seen /LPF      Methodology Automated Microscopy    High Sensitivity Troponin T 1Hr [[id number]] Collected: 03/28/25 0051    Specimen: Blood from Hand, Left Updated: 03/28/25 0126     HS Troponin T <6 ng/L      Comment: Specimen hemolyzed.  Results may be falsely decreased.        Troponin T Numeric Delta --     Comment: Unable to calculate.       Narrative:      High Sensitive Troponin T Reference Range:  <14.0 ng/L- Negative Female for AMI  <22.0 ng/L- Negative Male for AMI  >=14 - Abnormal Female indicating possible myocardial injury.  >=22 - Abnormal Male indicating possible myocardial injury.   Clinicians would have to utilize clinical acumen, EKG, Troponin, and serial changes to determine if it is an Acute Myocardial Infarction or myocardial injury due to an underlying chronic condition.                  Imaging:    XR Chest 1 View  Result Date: 3/28/2025  XR CHEST 1 VW-  Date of exam: 3/28/2025 12:23 AM.  Comparison: 11/11/2020.  INDICATIONS: 20-year-old female with unspecified weakness.  FINDINGS: A single AP (or PA) upright portable chest radiograph was performed. No cardiac enlargement is seen. No acute infiltrate is appreciated. No pleural effusion or pneumothorax is identified. External artifacts obscure detail. There is slight pulmonary hypoinflation. No significant interval change is seen since the prior study (or studies).       No acute infiltrate is appreciated.    Portions of this note  were completed with a voice recognition program.  3/28/2025 12:35 AM by Louis Jimenez MD on Workstation: [employer]      CT Head Without Contrast  Result Date: 3/27/2025  CT HEAD WO CONTRAST-  Date of exam: 3/27/2025, 11:40 P.M.  Indications: h/o fall w/ + LOC; + leukocytosis  Comparisons: 10/15/2024; 8/4/2023.  TECHNIQUE: Axial CT images were obtained of the head/brain without contrast administration. Reconstructed 2D (two-dimensional) coronal and sagittal images were also obtained. Automated exposure control and iterative construction methods were used. Additionally, the radiation dose reduction device was turned on for each scan per the ALARA (As Low as Reasonably Achievable) protocol. Please see the electronic medical record (EMR; Epic) for the recorded radiation dose. The [facility] CT Department has been made aware of this persistent artifact on several head CT scans.  FINDINGS: A routine nonenhanced head CT was performed. No acute brain abnormality is identified. No acute intracranial hemorrhage. No acute infarct is seen. The gray-white matter differentiation is preserved. No midline shift or acute intracranial mass effect is seen. The ventricular size and configuration are within normal limits. The extra-axial spaces are within normal limits. No acute skull fracture. No significant acute findings are seen with regard to the imaged orbits, paranasal sinuses, or middle ear clefts. There is chronic leftward nasal septal deviation with an associated nasal spur, seen previously. 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       No acute brain abnormality is seen. Specifically, no acute intracranial hemorrhage, no acute infarct, no significant intracranial mass lesion, and no acute intracranial mass effect are appreciated.    Portions of this note were completed with a voice recognition program.  3/27/2025 11:58 PM by Louis Jimenez MD on Workstation: [employer]           Differential Diagnosis and Discussion:    Syncope: Differential diagnosis includes but is not limited to TIA, hyperventilation, aortic stenosis, pulmonary emboli, myocardial disease, bradycardia arrhythmia, heart block, tachyarrhythmia, vasovagal, orthostatic hypotension, ruptured AAA, aortic dissection, subarachnoid hemorrhage, seizure, hypoglycemia.    PROCEDURES:    Labs were collected in the emergency department and all labs were reviewed and interpreted by me.  X-ray were performed in the emergency department and all X-ray impressions were independently interpreted by me.  An EKG was performed and the EKG was interpreted by supervising attending.  CT scan was performed in the emergency department and the CT scan radiology impression was interpreted by me.    ECG 12 Lead Syncope   Preliminary Result   HEART RATE=93  bpm   RR Plowrvmg=494  ms   CA Ybvuuxvb=135  ms   P Horizontal Axis=-3  deg   P Front Axis=29  deg   QRSD Interval=79  ms   QT Rgzhbbep=192  ms   VStK=739  ms   QRS Axis=-4  deg   T Wave Axis=3  deg   - NORMAL ECG -   Sinus rhythm   Date and Time of Study:2025-03-27 23:34:28          Procedures    MDM  Number of Diagnoses or Management Options  Contusion of head, unspecified part of head, initial encounter  Elevated LFTs  Hypertension, unspecified type  Syncope and collapse  Diagnosis management comments: The patient presented with a history of the syncopal episode. The patient is now resting comfortably and feels better, is alert, and is in no distress. The repeat examination is unremarkable and benign. The patient is neurologically intact, has a normal mental status, and is ambulatory in the ED. The electrocardiogram shows no signs of acute ischemia and the history, exam, diagnostic testing and current condition do not suggest that the patient is having an acute myocardial infarction, significant arrhythmia, unstable angina, a stroke or TIA, a significant vascular event, gastrointestinal  bleeding, sepsis, or other significant pathology that would warrant further testing, continued ED treatment, admission, or other specialist consultation at this point. The vital signs have been stable. The patient's condition is stable and appropriate for discharge. The patient will pursue further outpatient evaluation with the primary care physician, or designated physician. The patient was counseled to return to the ED for continued syncope, chest pain, palpitations, blood in their stool, or weakness or light-headedness that does not improve. They were advised to return to the ED for any symptoms with which they may feel concerned. The patient and family have expressed a clear and thorough understanding and agreed to follow-up as instructed.       Amount and/or Complexity of Data Reviewed  Clinical lab tests: reviewed and ordered  Tests in the radiology section of CPT®: reviewed and ordered  Tests in the medicine section of CPT®: ordered and reviewed  Decide to obtain previous medical records or to obtain history from someone other than the patient: yes    Risk of Complications, Morbidity, and/or Mortality  Presenting problems: moderate  Diagnostic procedures: moderate  Management options: low    Patient Progress  Patient progress: stable           Patient was placed on the cardiac monitor after being given Lopressor and hydralazine.  They were monitored for ventricular ectopy, arrhythmia, tachycardia, hypoxia, and changes in blood pressure.  Patient was rechecked several times throughout their stay for mental status decline and for reassessment of worsening changes in vital signs.     Total Critical Care time of 40 minutes. Total critical care time documented does not include time spent on separately billed procedures for services of nurses or physician assistants. I personally saw and examined the patient. I have reviewed all diagnostic interpretations and treatment plans as written. I was present for the key  portions of any procedures performed and the inclusive time noted in any critical care statement. Critical care time includes patient management by me, time spent at the patients bedside,  time to review lab and imaging results, discussing patient care, documentation in the medical record, and time spent with family or caregiver.          Patient Care Considerations:    CONSULT: I considered consulting neurology, however patient has no neurologic deficits.  Reviewed events is likely a vagal syncopal episode after trying to go the bathroom      Consultants/Shared Management Plan:    None    Social Determinants of Health:    Patient is independent, reliable, and has access to care.       Disposition and Care Coordination:    Discharged: I considered escalation of care by admitting this patient to the hospital, however all testing was unremarkable did not show any emergent findings.  Patient's blood pressure was elevated but she did not think she took her home blood pressure medicine today so IV meds were given to a more tolerable level and patient will continue her meds as prescribed    I have explained the patient´s condition, diagnoses and treatment plan based on the information available to me at this time. I have answered questions and addressed any concerns. The patient has a good  understanding of the patient´s diagnosis, condition, and treatment plan as can be expected at this point. The vital signs have been stable. The patient´s condition is stable and appropriate for discharge from the emergency department.      The patient will pursue further outpatient evaluation with the primary care physician or other designated or consulting physician as outlined in the discharge instructions. They are agreeable to this plan of care and follow-up instructions have been explained in detail. The patient has received these instructions in written format and has expressed an understanding of the discharge instructions. The  patient is aware that any significant change in condition or worsening of symptoms should prompt an immediate return to this or the closest emergency department or call to 911.  I have explained discharge medications and the need for follow up with the patient/caretakers. This was also printed in the discharge instructions. Patient was discharged with the following medications and follow up:      Medication List      No changes were made to your prescriptions during this visit.      [facility] CARDIOLOGY  [address] [facility] [id number] [phone]           Final diagnoses:   Syncope and collapse   Contusion of head, unspecified part of head, initial encounter   Elevated LFTs   Hypertension, unspecified type        ED Disposition       ED Disposition   Discharge    Condition   Stable    Comment   --               This medical record created using voice recognition software.             Suzi Nicolas, JOSE  03/28/25 0148

## 2025-03-28 NOTE — DISCHARGE INSTRUCTIONS
Continue to follow-up with your doctor for monitoring of your elevated LFTs that were previously detected and evaluated.    Testing otherwise here today was unremarkable.  As we discussed unsure if you may have had a vasovagal syncopal episode from trying to go to the bathroom but no other acute abnormalities were found here today.    Your blood pressure readings were still elevated in the emergency department please be sure to you take your home blood pressure medication as prescribed    Follow-up with cardiology.    Rest.  Plenty fluids.  Alternate Tylenol and Motrin for headache

## 2025-04-04 ENCOUNTER — TELEPHONE (OUTPATIENT)
Dept: CARDIOLOGY | Facility: CLINIC | Age: 20
End: 2025-04-04
Payer: COMMERCIAL

## 2025-04-04 NOTE — TELEPHONE ENCOUNTER
Caller: Amarilis Vinson    Relationship to patient: Self    Best call back number:   Telephone Information:   Mobile [phone]       Chief complaint: CARDIAC CLEARANCE/ PRIMARY HYPERTENSION    Type of visit: FOLLOW UP    Requested date: ASAP     If rescheduling, when is the original appointment: 4-21-25 PATIENT WAS ORIGINALLY SCHEDULED 3-28-25 BEFORE THIS    Additional notes:PATIENT IS HAVING A SCOPE ON 4-22-25.  THEY NEED CARDIAC CLEARANCE FOR THIS NOW.        REQUEST FOR CARDIAC CLEARANCE    Surgeon's name: DR. BULLOCK    Type of planned surgery: SCOPE    Date of planned surgery: 4-22-25    Have you been experiencing chest pain or shortness of breath? NO    Is your doctor requesting for you to stop any of your medications prior to your surgery? NO    Where should we fax the clearance to? DR. BULLOCK

## 2025-04-07 NOTE — TELEPHONE ENCOUNTER
Attempted to call patient. Left VM with call back number. Explained patient will need to be seen prior to clearance. Patient has not completed testing ordered previously, offered assistance in getting testing rescheduled. Left Call back number.

## 2025-04-07 NOTE — TELEPHONE ENCOUNTER
S/w patient, she stated she is going 4.11.2025 for cardiac testing. Explained why we needed clearance and that she will have to clearance to go through with her procedure. Patient gave verbal understanding.

## 2025-04-11 ENCOUNTER — OFFICE VISIT (OUTPATIENT)
Dept: CARDIOLOGY | Facility: CLINIC | Age: 20
End: 2025-04-11
Payer: COMMERCIAL

## 2025-04-11 VITALS
HEART RATE: 86 BPM | SYSTOLIC BLOOD PRESSURE: 195 MMHG | BODY MASS INDEX: 37.54 KG/M2 | DIASTOLIC BLOOD PRESSURE: 149 MMHG | WEIGHT: 204 LBS | HEIGHT: 62 IN

## 2025-04-11 DIAGNOSIS — Z01.810 PRE-OPERATIVE CARDIOVASCULAR EXAMINATION: ICD-10-CM

## 2025-04-11 DIAGNOSIS — I10 PRIMARY HYPERTENSION: Primary | ICD-10-CM

## 2025-04-11 DIAGNOSIS — E78.2 MIXED HYPERLIPIDEMIA: ICD-10-CM

## 2025-04-11 DIAGNOSIS — R55 SYNCOPE, UNSPECIFIED SYNCOPE TYPE: ICD-10-CM

## 2025-04-11 PROCEDURE — 99214 OFFICE O/P EST MOD 30 MIN: CPT

## 2025-04-11 RX ORDER — INSULIN LISPRO-AABC 200 [IU]/ML
INJECTION, SOLUTION SUBCUTANEOUS
COMMUNITY
Start: 2025-03-20

## 2025-04-13 LAB
QT INTERVAL: 360 MS
QTC INTERVAL: 449 MS

## 2025-04-14 NOTE — PROGRESS NOTES
Chief Complaint  Diabetes (Follow up, med mgt, A1C eval, CGM/Pump eval)    Referred By: JOSE Mcadams    Subjective          Patient or patient representative verbalized consent for the use of Ambient Listening during the visit with  JOSE Bliss for chart documentation. 4/16/2025  13:52 EDT    Amarilis Vinson presents to [facility] for insulin pump management    History of Present Illness    History of Present Illness  The patient presents for evaluation of diabetes mellitus.    The chief complaint includes persistent fatigue and difficulty maintaining a healthy diet due to her current living situation, which involves residing in a car with her . This situation has led to suboptimal blood glucose control. She is currently on U-200 insulin and has expressed concerns about the potential degradation of her insulin due to exposure to high temperatures in the car. An additional Dexcom device has been requested as her previous one was lost. Assistance with Medicaid enrollment is being sought to alleviate the financial burden of insulin costs. She is currently under the care of JOSE Mcadams for primary care.    Blood sugar levels have been challenging to manage, with an average blood sugar of 231 mg/dL over the last 14 days. The patient has been in the target range of 70 to 180 mg/dL, 35% of the time, in the high range of 181 to 250 mg/dL, 23% of the time, and in the very high range 42% of the time. Her A1c has improved from 9.3% on 03/12/2025 to 8.8% today.    She was supposed to get a scope done on 04/22/2025, but it has been postponed because cardiology clearance is required. She experienced a concussion when she passed out and hit her head against the door while using the bathroom. She could not remember who her  was or his best friend. She went to the ER, and they told her she was fine, but she continues to have issues.      Visit type:   "follow-up  Diabetes type:  Type 2  Current diabetes status/concerns/issues:  Concerns with insulin not being refrigerate and possible being homeless  Other health concerns:  Hypertension  Current Diabetes symptoms:    Polyuria: Yes     Polydipsia: Yes     Polyphagia: No   Blurred vision: Yes     Excessive fatigue: Yes    Known Diabetes complications:  Neuropathy: Numbness, Tingling, and Pain; Location: Feet, Hands, and Bilateral  Renal: Normal eGFR per current labs and Microalbuminuria - POSITIVE  Eyes: No Retinopathy reported on current eye exam and Without Macular Edema; Location: Bilateral  Amputation/Wounds: None  GI: Nausea  Cardiovascular: Hypertension, Hyperlipidemia, Hypercholesterolemia, and Hypertriglyceridemia  ED: N/A  Other: None  Hypoglycemia:  None reported at this time  Hypoglycemia Symptoms:  No hypoglycemia at this time  Current diabetes treatment:  Lyumjev U200 via tandem t:slim X2, Actos 45 mg daily, Farxiga 10 mg daily  Blood glucose device:  Dexcom CGM and Mckayla CGM  Blood glucose monitoring frequency:  Continuous per CGM  Blood glucose range/average:  The 14-day sensor report shows an average glucose of 231 mg/dL, with 35% in target range ( mgdL), 23% in the high range (181-250 mg/dL), 42% in the very high range (>250 mg/dL), 0% in the low range (54-70 mg/dL) and 0.1% in the very low range (<54 mg/dL).   Glucose Source: Device Reviewed  Diet:  Carbohydrate Counting, \"Eat what I want\"/No diet plan, Number of meals each day - 2-3; Number of snacks each day - 1-2  Activity/Exercise:  None    Past Medical History:   Diagnosis Date    Anxiety     Coronary artery disease     COVID 08/07/2022    NO CURRENT SYMPTOMS    Diabetes mellitus     Fatty liver     Foot pain, left     GERD (gastroesophageal reflux disease)     Graves disease     Hyperlipidemia     Hypertension     Migraines      Past Surgical History:   Procedure Laterality Date    FOOT SURGERY      DISTAL LEFT FIRST METATARSAL    " HARDWARE REMOVAL Left 8/26/2022    Procedure: HARDWARE REMOVAL;  left first metatarsal shaft;  Surgeon: Michel Jaffe DPM;  Location: [facility] MAIN OR;  Service: Podiatry;  Laterality: Left;    ULNAR OSTEOPLASTY  2018     Family History   Adopted: Yes   Problem Relation Age of Onset    Alcohol abuse Mother         Bio mother    Alcohol abuse Father         Bio dad    Diabetes Father     Colon cancer Neg Hx      Social History     Socioeconomic History    Marital status:    Tobacco Use    Smoking status: Never     Passive exposure: Never    Smokeless tobacco: Never   Vaping Use    Vaping status: Never Used   Substance and Sexual Activity    Alcohol use: Not Currently    Drug use: Not Currently    Sexual activity: Yes     Partners: Male     Birth control/protection: Condom, Birth control pill     Allergies   Allergen Reactions    Novolog [Insulin Aspart (Human Analog)] Hives       Current Outpatient Medications:     Alcohol Swabs pads, Use 1 Pad Daily., Disp: 100 each, Rfl: 1    amLODIPine (NORVASC) 10 MG tablet, Take 1 tablet by mouth Daily., Disp: 30 tablet, Rfl: 1    aspirin-acetaminophen-caffeine (EXCEDRIN MIGRAINE) 250-250-65 MG per tablet, Take  by mouth., Disp: , Rfl:     atorvastatin (LIPITOR) 20 MG tablet, Take 1 tablet by mouth Every Night., Disp: 30 tablet, Rfl: 0    chlorthalidone (HYGROTON) 25 MG tablet, Take 1 tablet by mouth Daily., Disp: 30 tablet, Rfl: 0    Continuous Glucose Sensor (Dexcom G7 Sensor) misc, Use 1 each Every 10 (Ten) Days for 180 days., Disp: 3 each, Rfl: 5    docusate sodium (Colace) 100 MG capsule, Take 1 capsule by mouth 2 (Two) Times a Day., Disp: 60 capsule, Rfl: 3    levothyroxine (SYNTHROID, LEVOTHROID) 25 MCG tablet, Take 1 tablet by mouth Every Morning., Disp: 30 tablet, Rfl: 1    losartan (Cozaar) 100 MG tablet, Take 1 tablet by mouth Daily., Disp: 30 tablet, Rfl: 1    Lyumjev KwikPen 200 UNIT/ML solution pen-injector, INJECT 80 UNITS UNDER THE SKIN THREE TIMES  "DAILY BEFORE A MEAL, Disp: , Rfl:     Norethin-Eth Estrad-Fe Biphas (Lo Loestrin Fe) 1 MG-10 MCG / 10 MCG tablet, Take 1 tablet by mouth Daily., Disp: 28 tablet, Rfl: 3    omeprazole (priLOSEC) 20 MG capsule, Take 1 capsule by mouth Daily., Disp: 90 capsule, Rfl: 1    ondansetron ODT (ZOFRAN-ODT) 4 MG disintegrating tablet, Place 1 tablet on the tongue Every 4 (Four) Hours As Needed for Nausea or Vomiting., Disp: 15 tablet, Rfl: 0    pioglitazone (Actos) 45 MG tablet, Take 1 tablet by mouth Daily., Disp: 30 tablet, Rfl: 2    Objective     Vitals:    04/16/25 1320 04/16/25 1333   BP: (!) 193/133 (!) 176/118  Comment: manual bp recheck   BP Location: Left arm Left arm   Patient Position: Sitting Sitting   Cuff Size: Adult Adult   Pulse: 100    SpO2: 99%    Weight: 95.1 kg (209 lb 9.6 oz)    Height: 157.5 cm (62.01\")      Body mass index is 38.32 kg/m².    Physical Exam  Constitutional:       Appearance: Normal appearance. She is obese.      Comments: Severe Obesity with Comorbidity ( BMI 35 - 39.9) Pt Current BMI = 38.32 with comorbidities of hypertension, hyperlipidemie, and diabetes     HENT:      Head: Normocephalic and atraumatic.      Right Ear: External ear normal.      Left Ear: External ear normal.      Nose: Nose normal.   Eyes:      Extraocular Movements: Extraocular movements intact.      Conjunctiva/sclera: Conjunctivae normal.   Pulmonary:      Effort: Pulmonary effort is normal.   Musculoskeletal:         General: Normal range of motion.      Cervical back: Normal range of motion.   Skin:     General: Skin is warm and dry.   Neurological:      General: No focal deficit present.      Mental Status: She is alert and oriented to person, place, and time. Mental status is at baseline.   Psychiatric:         Mood and Affect: Mood normal.         Behavior: Behavior normal.         Thought Content: Thought content normal.         Judgment: Judgment normal.         Result Review :   The following data was " reviewed by: JOSE Bliss on 04/16/2025:    Most Recent A1C          4/16/2025    13:32   HGBA1C Most Recent   Hemoglobin A1C 8.8        A1C Last 3 Results          2/12/2025    13:02 3/12/2025    13:38 4/16/2025    13:32   HGBA1C Last 3 Results   Hemoglobin A1C 9.3  9.3  8.8      A1c collected in the office today is 8.8%, indicating Uncontrolled Type II diabetes.  This result is down from the prior result of 9.3% collected on 3/12/2025    Glucose   Date Value Ref Range Status   04/16/2025 231 (H) 70 - 99 mg/dL Final     Comment:     Serial Number: [id number]:  [id number]     Point of care glucose in the office today is  elevated at 231 mg/dL    Creatinine   Date Value Ref Range Status   03/27/2025 0.71 0.57 - 1.00 mg/dL Final   02/26/2025 0.85 0.57 - 1.00 mg/dL Final     eGFR   Date Value Ref Range Status   03/27/2025 123.5 >60.0 mL/min/1.73 Final   02/26/2025 99.5 >60.0 mL/min/1.73 Final     Labs collected on 3/27/2025 show Normal values    Microalbumin, Urine   Date Value Ref Range Status   02/12/2025 10.2 mg/dL Final   02/06/2024 1.4 mg/dL Final     Creatinine, Urine   Date Value Ref Range Status   02/12/2025 110.5 mg/dL Final     Microalbumin/Creatinine Ratio   Date Value Ref Range Status   02/12/2025 92.3 (H) 0.0 - 29.0 mg/g Final     Urine microalbuminuria collected on 2/12/2025 is positive for microalbuminuria    Total Cholesterol   Date Value Ref Range Status   02/26/2025 211 (H) 0 - 200 mg/dL Final   08/14/2024 235 (H) 0 - 200 mg/dL Final     Triglycerides   Date Value Ref Range Status   02/26/2025 397 (H) 0 - 150 mg/dL Final   08/14/2024 480 (H) 0 - 150 mg/dL Final     HDL Cholesterol   Date Value Ref Range Status   02/26/2025 23 (L) 40 - 60 mg/dL Final   08/14/2024 37 (L) 40 - 60 mg/dL Final     LDL Cholesterol    Date Value Ref Range Status   02/26/2025 118 (H) 0 - 100 mg/dL Final   08/14/2024 114 (H) 0 - 100 mg/dL Final     Lipid panel collected on 2/26/2025 shows Hyperlipidemia,  Hypercholesterolemia, Hypertriglyceridemia, and low HDL            Diagnoses and all orders for this visit:    1. Type 2 diabetes mellitus with hyperglycemia, with long-term current use of insulin (Primary)  -     POC Glucose  -     POC Glycosylated Hemoglobin (Hb A1C)    2. Type 2 diabetes mellitus with diabetic polyneuropathy, with long-term current use of insulin    3. Primary hypertension    4. Insulin pump titration    5. Uses self-applied continuous glucose monitoring device    6. Class 2 severe obesity due to excess calories with serious comorbidity and body mass index (BMI) of 38.0 to 38.9 in adult        Assessment & Plan  1. Diabetes Mellitus.  - Her A1c levels have shown a decrease from 9.3% on 03/12/2025 to 8.8% as of today.  - The sensor report indicates that her average blood glucose level over the past 14 days has been 231 mg/dL. She has been within the target range of 70 to 180 mg/dL for 35% of the time, between 181 to 250 mg/dL for 23% of the time, and above 250 mg/dL for 42% of the time.  - The current correction factor is set at 1:28 from midnight to 7 AM and 1:25 from 7 AM to midnight. The carbohydrate-to-insulin ratio appears to be appropriate.  - The correction factor will be adjusted to 1:25 from midnight to 7 AM and intensified to 1:20 during the day. No changes will be made to the carbohydrate ratio. She is advised to contact us if she experiences a shortage of insulin. A message will be sent to Yamel, the chronic care manager, regarding her case.    Follow-up: The patient will follow up in a few months.    The patient will monitor her blood glucose levels per continuous glucose monitor.  If she develops problematic hyperglycemia or hypoglycemia or adverse drug reactions, she will contact the office for further instructions.        Follow Up     Return in about 2 months (around 6/16/2025) for CGM Follow-Up, Medication Management, Insulin Pump Follow-Up.    Patient was given instructions and  counseling regarding her condition or for health maintenance advice. Please see specific information pulled into the AVS if appropriate.     Walter Tate, APRN  04/16/2025    Dictated Utilizing Dragon Dictation.  Please note that portions of this note were completed with a voice recognition program.  Part of this note may be an electronic transcription/translation of spoken language to printed text using the Dragon Dictation System.

## 2025-04-14 NOTE — PROGRESS NOTES
Chief Complaint  cardiac clearance (PT needs cardiac clearance before having scope done by dr hernandez)    Subjective        History of Present Illness  Amarilis Vinson presents to [facility] CARDIOLOGY for follow up.   Patient is a 20-year-old female with past medical history outlined below, significant for hypertension, hyperlipidemia and diabetes.  She continues to experience syncopal episodes.  She is seeking preoperative cardiovascular risk stratification for EGD.  She notes a recent syncopal episode where she lost consciousness and hit her head.  She denies any precipitating factors.  She notes no chest pain or discomfort.  She has no palpitations or edema.  She has no dyspnea.    Past Medical History:   Diagnosis Date    Anxiety     Coronary artery disease     COVID 08/07/2022    NO CURRENT SYMPTOMS    Diabetes mellitus     Fatty liver     Foot pain, left     GERD (gastroesophageal reflux disease)     Graves disease     Hyperlipidemia     Hypertension     Migraines        ALLERGY  Allergies   Allergen Reactions    Novolog [Insulin Aspart (Human Analog)] Hives        Past Surgical History:   Procedure Laterality Date    FOOT SURGERY      DISTAL LEFT FIRST METATARSAL    HARDWARE REMOVAL Left 8/26/2022    Procedure: HARDWARE REMOVAL;  left first metatarsal shaft;  Surgeon: Michel Jaffe DPM;  Location: [facility];  Service: Podiatry;  Laterality: Left;    ULNAR OSTEOPLASTY  2018        Social History     Socioeconomic History    Marital status:    Tobacco Use    Smoking status: Never     Passive exposure: Never    Smokeless tobacco: Never   Vaping Use    Vaping status: Never Used   Substance and Sexual Activity    Alcohol use: Not Currently    Drug use: Not Currently    Sexual activity: Yes     Partners: Male     Birth control/protection: Condom, Birth control pill       Family History   Adopted: Yes   Problem Relation Age of Onset    Alcohol abuse Mother         Bio mother     "Alcohol abuse Father         Bio dad    Diabetes Father     Colon cancer Neg Hx         Current Outpatient Medications on File Prior to Visit   Medication Sig    Alcohol Swabs pads Use 1 Pad Daily.    amLODIPine (NORVASC) 10 MG tablet Take 1 tablet by mouth Daily.    aspirin-acetaminophen-caffeine (EXCEDRIN MIGRAINE) 250-250-65 MG per tablet Take  by mouth.    atorvastatin (LIPITOR) 20 MG tablet Take 1 tablet by mouth Every Night.    chlorthalidone (HYGROTON) 25 MG tablet Take 1 tablet by mouth Daily.    Continuous Glucose Sensor (Dexcom G7 Sensor) misc Use 1 each Every 10 (Ten) Days for 180 days.    docusate sodium (Colace) 100 MG capsule Take 1 capsule by mouth 2 (Two) Times a Day.    levothyroxine (SYNTHROID, LEVOTHROID) 25 MCG tablet Take 1 tablet by mouth Every Morning.    losartan (Cozaar) 100 MG tablet Take 1 tablet by mouth Daily.    Lyumjev KwikPen 200 UNIT/ML solution pen-injector INJECT 80 UNITS UNDER THE SKIN THREE TIMES DAILY BEFORE A MEAL    Norethin-Eth Estrad-Fe Biphas (Lo Loestrin Fe) 1 MG-10 MCG / 10 MCG tablet Take 1 tablet by mouth Daily.    omeprazole (priLOSEC) 20 MG capsule Take 1 capsule by mouth Daily.    ondansetron ODT (ZOFRAN-ODT) 4 MG disintegrating tablet Place 1 tablet on the tongue Every 4 (Four) Hours As Needed for Nausea or Vomiting.    pioglitazone (Actos) 45 MG tablet Take 1 tablet by mouth Daily.     No current facility-administered medications on file prior to visit.       Objective   Vitals:    04/11/25 1105   BP: (!) 195/149  Comment: pt has not had her meds this morning   BP Location: Left arm   Patient Position: Sitting   Cuff Size: Adult   Pulse: 86   Weight: 92.5 kg (204 lb)   Height: 157.5 cm (62.01\")       Physical Exam  Constitutional:       General: She is awake. She is not in acute distress.     Appearance: Normal appearance.   HENT:      Head: Normocephalic.      Nose: Nose normal. No congestion.   Eyes:      Extraocular Movements: Extraocular movements intact.      " Conjunctiva/sclera: Conjunctivae normal.      Pupils: Pupils are equal, round, and reactive to light.   Neck:      Thyroid: No thyromegaly.      Vascular: No JVD.   Cardiovascular:      Rate and Rhythm: Normal rate and regular rhythm.      Chest Wall: PMI is not displaced.      Pulses: Normal pulses.      Heart sounds: Normal heart sounds, S1 normal and S2 normal. No murmur heard.     No friction rub. No gallop. No S3 or S4 sounds.   Pulmonary:      Effort: Pulmonary effort is normal.      Breath sounds: Normal breath sounds. No wheezing, rhonchi or rales.   Abdominal:      General: Bowel sounds are normal.      Palpations: Abdomen is soft.      Tenderness: There is no abdominal tenderness.   Musculoskeletal:      Cervical back: No tenderness.      Right lower leg: No edema.      Left lower leg: No edema.   Lymphadenopathy:      Cervical: No cervical adenopathy.   Skin:     General: Skin is warm and dry.      Capillary Refill: Capillary refill takes less than 2 seconds.      Coloration: Skin is not cyanotic.      Findings: No petechiae or rash.      Nails: There is no clubbing.   Neurological:      Mental Status: She is alert.   Psychiatric:         Mood and Affect: Mood normal.         Behavior: Behavior is cooperative.           Result Review     The following data was reviewed by JOSE Mclean on 04/14/25.      CMP          10/25/2024    14:27 2/26/2025    15:45 3/27/2025    23:23   CMP   Glucose 164  261  185    BUN 19  9  8    Creatinine 0.84  0.85  0.71    EGFR 101.5  99.5  123.5    Sodium 136  137  137    Potassium 3.6  4.2  4.3    Chloride 96  99  101    Calcium 10.5  10.2  9.7    Total Protein 7.7  7.5  7.9    Albumin 4.3  3.7  4.3    Globulin 3.4  3.8  3.6    Total Bilirubin <0.2  0.3  0.3    Alkaline Phosphatase 115  153  150    AST (SGOT) 109  145  108    ALT (SGPT) 52  61  69    Albumin/Globulin Ratio 1.3  1.0  1.2    BUN/Creatinine Ratio 22.6  10.6  11.3    Anion Gap 19.3  14.3  13.1       CBC w/diff          8/14/2024    10:41 2/26/2025    15:45 3/27/2025    23:23   CBC w/Diff   WBC 10.41  13.03  13.64    RBC 4.83  5.23  5.46    Hemoglobin 14.3  15.1  15.2    Hematocrit 42.4  45.3  44.8    MCV 87.8  86.6  82.1    MCH 29.6  28.9  27.8    MCHC 33.7  33.3  33.9    RDW 12.7  12.8  12.4    Platelets 311  392  371    Neutrophil Rel % 60.3  63.9  61.7    Immature Granulocyte Rel % 0.6  0.7  0.4    Lymphocyte Rel % 31.1  28.2  30.4    Monocyte Rel % 5.9  5.3  5.3    Eosinophil Rel % 1.7  1.5  1.6    Basophil Rel % 0.4  0.4  0.6       Lipid Panel          5/14/2024    12:09 8/14/2024    10:41 2/26/2025    15:45   Lipid Panel   Total Cholesterol 182  235  211    Triglycerides 373  480  397    HDL Cholesterol 30  37  23    VLDL Cholesterol 62  84  70    LDL Cholesterol  90  114  118    LDL/HDL Ratio 2.58  2.76  4.72            No results found for this or any previous visit.          Procedures      Assessment & Plan  Primary hypertension  Blood pressure is highly elevated today however patient reports significant anxiety when seeing the doctor.  She is advised to monitor her blood pressure at home and notify us if it remains elevated.  Mixed hyperlipidemia  Continue statin therapy.  Syncope, unspecified syncope type  I am concerned about her syncopal episodes.  They are ongoing.  She will be scheduled for tilt table testing.  Recommended cardiac event monitor but patient reports these make her too anxious.  Pre-operative cardiovascular examination  Hold clearance for further testing.                     Follow Up   Return in 6 months (on 10/11/2025), or if symptoms worsen or fail to improve.    Patient was given instructions and counseling regarding her condition or for health maintenance advice. Please see specific information pulled into the AVS if appropriate.     Kacy Vanessa, APRN  04/14/25  11:18 EDT    Dictated Utilizing Dragon Dictation

## 2025-04-14 NOTE — ASSESSMENT & PLAN NOTE
Blood pressure is highly elevated today however patient reports significant anxiety when seeing the doctor.  She is advised to monitor her blood pressure at home and notify us if it remains elevated.

## 2025-04-14 NOTE — TELEPHONE ENCOUNTER
Pt went to her appt with cardio on 4.11.2025. resent clearance to office, will send to Dr.Haides HWANG once response is given.

## 2025-04-16 ENCOUNTER — OFFICE VISIT (OUTPATIENT)
Dept: DIABETES SERVICES | Facility: HOSPITAL | Age: 20
End: 2025-04-16
Payer: COMMERCIAL

## 2025-04-16 VITALS
DIASTOLIC BLOOD PRESSURE: 118 MMHG | HEART RATE: 100 BPM | SYSTOLIC BLOOD PRESSURE: 176 MMHG | HEIGHT: 62 IN | WEIGHT: 209.6 LBS | OXYGEN SATURATION: 99 % | BODY MASS INDEX: 38.57 KG/M2

## 2025-04-16 DIAGNOSIS — E11.65 TYPE 2 DIABETES MELLITUS WITH HYPERGLYCEMIA, WITH LONG-TERM CURRENT USE OF INSULIN: Primary | ICD-10-CM

## 2025-04-16 DIAGNOSIS — Z46.81 INSULIN PUMP TITRATION: ICD-10-CM

## 2025-04-16 DIAGNOSIS — Z79.4 TYPE 2 DIABETES MELLITUS WITH HYPERGLYCEMIA, WITH LONG-TERM CURRENT USE OF INSULIN: Primary | ICD-10-CM

## 2025-04-16 DIAGNOSIS — E11.42 TYPE 2 DIABETES MELLITUS WITH DIABETIC POLYNEUROPATHY, WITH LONG-TERM CURRENT USE OF INSULIN: ICD-10-CM

## 2025-04-16 DIAGNOSIS — E66.01 CLASS 2 SEVERE OBESITY DUE TO EXCESS CALORIES WITH SERIOUS COMORBIDITY AND BODY MASS INDEX (BMI) OF 38.0 TO 38.9 IN ADULT: ICD-10-CM

## 2025-04-16 DIAGNOSIS — E66.812 CLASS 2 SEVERE OBESITY DUE TO EXCESS CALORIES WITH SERIOUS COMORBIDITY AND BODY MASS INDEX (BMI) OF 38.0 TO 38.9 IN ADULT: ICD-10-CM

## 2025-04-16 DIAGNOSIS — I10 PRIMARY HYPERTENSION: ICD-10-CM

## 2025-04-16 DIAGNOSIS — Z79.4 TYPE 2 DIABETES MELLITUS WITH DIABETIC POLYNEUROPATHY, WITH LONG-TERM CURRENT USE OF INSULIN: ICD-10-CM

## 2025-04-16 DIAGNOSIS — Z97.8 USES SELF-APPLIED CONTINUOUS GLUCOSE MONITORING DEVICE: ICD-10-CM

## 2025-04-16 LAB
EXPIRATION DATE: ABNORMAL
GLUCOSE BLDC GLUCOMTR-MCNC: 231 MG/DL (ref 70–99)
HBA1C MFR BLD: 8.8 % (ref 4.5–5.7)
Lab: ABNORMAL

## 2025-04-16 PROCEDURE — G0463 HOSPITAL OUTPT CLINIC VISIT: HCPCS

## 2025-04-16 PROCEDURE — 95251 CONT GLUC MNTR ANALYSIS I&R: CPT

## 2025-04-16 PROCEDURE — 99214 OFFICE O/P EST MOD 30 MIN: CPT

## 2025-04-16 PROCEDURE — 82948 REAGENT STRIP/BLOOD GLUCOSE: CPT

## 2025-04-16 PROCEDURE — 83036 HEMOGLOBIN GLYCOSYLATED A1C: CPT

## 2025-04-17 ENCOUNTER — PATIENT OUTREACH (OUTPATIENT)
Dept: CASE MANAGEMENT | Facility: OTHER | Age: 20
End: 2025-04-17
Payer: COMMERCIAL

## 2025-04-17 DIAGNOSIS — I10 PRIMARY HYPERTENSION: ICD-10-CM

## 2025-04-17 DIAGNOSIS — E11.65 TYPE 2 DIABETES MELLITUS WITH HYPERGLYCEMIA, WITHOUT LONG-TERM CURRENT USE OF INSULIN: Primary | ICD-10-CM

## 2025-04-17 NOTE — OUTREACH NOTE
AMBULATORY CASE MANAGEMENT NOTE    Names and Relationships of Patient/Support Persons: Contact: Amarilis Vinson; Relationship: Self -     Patient Outreach    I spoke with patient on the phone after JOSE Pierre referred patient to me. Patient states she currently lives on post in an apartment. She stated that her name is on the lease as a person who lives there but her mother is the primary person on the lease, although she has never lived there. The patient and her mother are no longer speaking. The mother reportedly told the patient that she has to be out by the end of the month because she is moving someone else into the apartment. Patient is concerned that she will soon be homeless.    She is also concerned about her insurance coverage because she is currently being covered by her mother's health insurance and she believes her mother is going to have her removed. She stated that someone from the hospital has been attempting to help her get signed up for Medicaid but she is unsure where she is in the process.    Patient was seen by PCP office last month. BP is uncontrolled. Medications changed. Patient stated that she does have her medications in her possession and takes them, although she does miss doses, due to forgetting. I explained the importance of taking these medications as prescribed due to risk of stroke, heart attach, death with her BP as high as it has been for a prolonged period of time. She verbalized understanding.    Patient is being followed by JOSE Prado for her T2DM. She has a CGM and an insulin pump currently. Her most recent A1c was 8.8. This is elevated but lower than it was 2 months ago. We discussed this, as well.    Noni established with patient. Patient given my direct number to call with further questions or concerns      Care Coordination    I have placed a social work referral for possible homelessness and medical insurance information.      Maryam CHAMBERS  Ambulatory Case  Management    4/17/2025, 13:57 EDT

## 2025-04-18 ENCOUNTER — TELEPHONE (OUTPATIENT)
Dept: GASTROENTEROLOGY | Facility: CLINIC | Age: 20
End: 2025-04-18
Payer: COMMERCIAL

## 2025-04-18 NOTE — TELEPHONE ENCOUNTER
Amarilis Vinson  2005     Patient had to reschedule their EGD. I have offered to reschedule this patient and patient has agreed. Patient has been rescheduled to 5/29/25 @ 11:00 am.    Reason for rescheduling: Did not have Cardiac Clearance. Tilt Table Test is scheduled for 4.30.25    Original date of case request: 3/19/25    This procedure was ordered by JOSE Barahona for an important reason. I have thoroughly discussed with the patient that there are risks associated with not proceeding with the procedure at this time such as a delay in diagnosis, risk of incurable disease, or cancer. Patient verbalized understanding the risks discussed.    Updated clearance needed?: Yes - Cardiac    Is there a follow up appointment that needs to be rescheduled after the procedure date? No    Is the patient currently on any injectable medications for weight loss or diabetes? No    Has endo scheduling been notified? Yes    If yes, who did you speak to? Nabila    Has the case request been updated? Yes    5.29 11:00 am

## 2025-04-20 DIAGNOSIS — Z78.9 USES BIRTH CONTROL: ICD-10-CM

## 2025-04-21 ENCOUNTER — PATIENT OUTREACH (OUTPATIENT)
Age: 20
End: 2025-04-21
Payer: COMMERCIAL

## 2025-04-21 DIAGNOSIS — Z78.9 USES BIRTH CONTROL: ICD-10-CM

## 2025-04-21 RX ORDER — NORETHINDRONE ACETATE AND ETHINYL ESTRADIOL, ETHINYL ESTRADIOL AND FERROUS FUMARATE 1MG-10(24)
1 KIT ORAL DAILY
Qty: 28 TABLET | Refills: 3 | OUTPATIENT
Start: 2025-04-21

## 2025-04-21 NOTE — OUTREACH NOTE
Social Work Assessment  Questions/Answers      Flowsheet Row Most Recent Value   Referral Source physician   Reason for Consult community resources   Preferred Language English   Decision Making Considerations patient/family ability to make health care decisions   People in Home spouse   Current Living Arrangements apartment   In the past 12 months has the electric, gas, oil, or water company threatened to shut off services in your home? No   Primary Care Provided by self   Family Caregiver if Needed spouse   Quality of Family Relationships helpful   Employment Status unemployed   Current or Previous  Service none   Source of Income none   Financial/Environmental Concerns eviction pending, unable to afford food, unable to afford rent/mortgage   Application for Public Assistance obtained public assistance pending number   Spiritual, Cultural Beliefs, Sabianism Practices, Values that Affect Care no   Medications independent   Meal Preparation independent   Housekeeping independent   Laundry independent   Shopping independent   If for any reason you need help with day-to-day activities such as bathing, preparing meals, shopping, managing finances, etc., do you get the help you need? I don't need any help   Feels Unsafe at Home or Work/School no   Feels Threatened by Someone no   Does Anyone Try to Keep You From Having Contact with Others or Doing Things Outside Your Home? no   Physical Signs of Abuse Present no   Current Self-injurious Behavior denies   Feels Unsafe at Home or Work/School no   Feels Threatened by Someone no   Does Anyone Try to Keep You From Having Contact with Others or Doing Things Outside Your Home? no   Physical Signs of Abuse Present no   Past Self-injurious Behavior denies   Previous Mental Health Treatment none   Substance Use Status never used   Major Change/Loss/Stressor housing concerns          SDOH updated and reviewed with the patient during this program:  --     Disabilities: Not  At Risk (6/5/2024)    Disabilities     Concentrating, Remembering, or Making Decisions Difficulty: no     Doing Errands Independently Difficulty: no      --     Employment: Not At Risk (6/5/2024)    Employment     Do you want help finding or keeping work or a job?: I do not need or want help      Financial Resource Strain: High Risk (4/17/2025)    Overall Financial Resource Strain (CARDIA)     Difficulty of Paying Living Expenses: Very hard      --     Food Insecurity: Food Insecurity Present (3/1/2024)    Hunger Vital Sign     Worried About Running Out of Food in the Last Year: Often true     Ran Out of Food in the Last Year: Often true      --     Housing Stability: Not At Risk (4/21/2025)    Housing Stability     Current Living Arrangements: apartment     Potentially Unsafe Housing Conditions: none   Recent Concern: Housing Stability - High Risk (4/17/2025)    Housing Stability Vital Sign     Unable to Pay for Housing in the Last Year: Yes     Homeless in the Last Year: No      --     Transportation Needs: Unmet Transportation Needs (4/17/2025)    PRAPARE - Transportation     Lack of Transportation (Medical): Yes     Lack of Transportation (Non-Medical): Yes      --     Utilities: Not At Risk (4/21/2025)    [facility] Utilities     Threatened with loss of utilities: No      Continuing Care   [facility]   [facility] [place]    [address] [facility] [address]    Phone: [phone]    Request Status: Pending - No Request Sent    Services: Financial Assistance, Food Insecurity Services    Resource for: Financial Resource Strain, Food Insecurity, Utilities   [facility]    [address] [facility] [address]    Phone: [phone]    Request Status: Pending - No Request Sent    Services: Substance Use Services    Resource for: Alcohol Use, Tobacco Use     Patient Outreach    MSW spoke with patient to discuss current needs for resources. Patient states that her mother's name is on the  maddy, but she is kicking patient and s/o out of apartment due to nonpayment of rent. MSW spoke with patient about Legal Assistance through [employer], but patient states that she is just going to need to move at this time. Patient states that her and s/o have no income currently. MSW offered employment resources, but patient states that she is going to try to obtain disability through [employer] due to migraines and unable to work. MSW to send patient information on how to apply. Patient also states that her s/o is going to try to apply for active duty for income. Patient inquired about Medicaid insurance, food stamps, housing, and places to help with rent. MSW to send resources to [facility] as requested.    Elisa CALI -   Ambulatory Case Management    4/21/2025, 15:13 EDT

## 2025-04-21 NOTE — TELEPHONE ENCOUNTER
Caller: Amarilis Vinson    Relationship: Self    Best call back number:     [phone]       Requested Prescriptions:   Requested Prescriptions     Pending Prescriptions Disp Refills    Norethin-Eth Estrad-Fe Biphas (Lo Loestrin Fe) 1 MG-10 MCG / 10 MCG tablet 28 tablet 3     Sig: Take 1 tablet by mouth Daily.        Pharmacy where request should be sent: [id number] - [facility] [place] - [address] AT [facility] [phone] [facility] [phone]      Last office visit with prescribing clinician: 10/25/2024   Last telemedicine visit with prescribing clinician: Visit date not found   Next office visit with prescribing clinician: Visit date not found       Does the patient have less than a 3 day supply:  [x] Yes  [] No    Would you like a call back once the refill request has been completed: [] Yes [x] No    If the office needs to give you a call back, can they leave a voicemail: [] Yes [x] No    Socorro Sánchez Rep   04/21/25 09:28 EDT

## 2025-04-21 NOTE — TELEPHONE ENCOUNTER
Patient was due for a 2-week follow-up around 3-26 but no showed her appointment, please schedule.    If patient was taking the birth control as prescribed she should have ran out of birth control pills in January.  Has she been getting from another provider?  If she would like a refill she needs to stop by the office for urine pregnancy.

## 2025-04-22 ENCOUNTER — CLINICAL SUPPORT (OUTPATIENT)
Dept: INTERNAL MEDICINE | Facility: CLINIC | Age: 20
End: 2025-04-22
Payer: COMMERCIAL

## 2025-04-22 DIAGNOSIS — Z78.9 USES BIRTH CONTROL: Primary | ICD-10-CM

## 2025-04-22 LAB
B-HCG UR QL: NEGATIVE
EXPIRATION DATE: NORMAL
INTERNAL NEGATIVE CONTROL: NORMAL
INTERNAL POSITIVE CONTROL: NORMAL
Lab: NORMAL

## 2025-04-22 NOTE — TELEPHONE ENCOUNTER
Patient is coming in today for the pregnancy test and has an appointment with another provider for a follow-up.     Patient also stated that she was refilling off of her GYN and didn't notice

## 2025-04-22 NOTE — PROGRESS NOTES
Patient came into office today for a urine pregnancy test for continuation of birth control, see results for more information

## 2025-04-22 NOTE — TELEPHONE ENCOUNTER
Patient came in today for urine pregnancy test, patients test was negative. Patient states that she only has two pills left and is wondering if this can be sent in now.

## 2025-04-23 ENCOUNTER — TELEPHONE (OUTPATIENT)
Dept: GASTROENTEROLOGY | Facility: CLINIC | Age: 20
End: 2025-04-23
Payer: COMMERCIAL

## 2025-04-23 RX ORDER — NORETHINDRONE ACETATE AND ETHINYL ESTRADIOL, ETHINYL ESTRADIOL AND FERROUS FUMARATE 1MG-10(24)
1 KIT ORAL DAILY
Qty: 28 TABLET | Refills: 3 | Status: SHIPPED | OUTPATIENT
Start: 2025-04-23

## 2025-04-23 NOTE — TELEPHONE ENCOUNTER
S/w pt regarding overdue results for [facility], pt is agreeable to complete but would like to call herself to schedule instead of being transferred. Pt isn't sure if she will be moving or not.

## 2025-04-24 ENCOUNTER — TELEPHONE (OUTPATIENT)
Dept: INTERNAL MEDICINE | Facility: CLINIC | Age: 20
End: 2025-04-24

## 2025-04-24 DIAGNOSIS — Z79.4 TYPE 2 DIABETES MELLITUS WITH HYPERGLYCEMIA, WITH LONG-TERM CURRENT USE OF INSULIN: Primary | ICD-10-CM

## 2025-04-24 DIAGNOSIS — E11.65 TYPE 2 DIABETES MELLITUS WITH HYPERGLYCEMIA, WITH LONG-TERM CURRENT USE OF INSULIN: Primary | ICD-10-CM

## 2025-04-24 RX ORDER — INSULIN LISPRO-AABC 200 [IU]/ML
80 INJECTION, SOLUTION SUBCUTANEOUS
Qty: 36 ML | Refills: 5 | Status: SHIPPED | OUTPATIENT
Start: 2025-04-24

## 2025-04-24 NOTE — TELEPHONE ENCOUNTER
Caller: Amarilis Vinson    Relationship to patient: Self    Best call back number: [phone]     Chief complaint: MEDICATION FOLLOW UP    Type of visit: OFFICE VISIT    Requested date: AS SOON AS POSSIBLE     If rescheduling, when is the original appointment: 04.29.2025     Additional notes:PATIENT IS REQUESTING TO RESCHEDULE APPOINTMENT WITH JEET HEDRICK NOW THAT SHE IS BACK FROM [facility]. PATIENT STATES THAT SHE ONLY WANTS TO SEE JEET AND NO OTHER PROVIDER. FIRST AVAILABLE APPOINTMENT WITH JEET HEDRICK IS 07.25.2025. PATIENT IS WANTING TO KNOW IF SHE CAN BE SEEN SOONER OR IF HER PRESCRIPTIONS CAN BE REFILLED UNTIL JULY APPOINTMENT. PLEASE ADVISE.

## 2025-04-25 ENCOUNTER — TELEPHONE (OUTPATIENT)
Dept: GASTROENTEROLOGY | Facility: CLINIC | Age: 20
End: 2025-04-25
Payer: COMMERCIAL

## 2025-04-25 NOTE — TELEPHONE ENCOUNTER
S/w patient regarding labs, she stated she will get them done next week. Let her know we would probably call to follow up in 2 weeks.

## 2025-05-05 ENCOUNTER — PATIENT OUTREACH (OUTPATIENT)
Dept: CASE MANAGEMENT | Facility: OTHER | Age: 20
End: 2025-05-05
Payer: COMMERCIAL

## 2025-05-05 NOTE — OUTREACH NOTE
AMBULATORY CASE MANAGEMENT NOTE    Names and Relationships of Patient/Support Persons: Contact: Amarilis Vinson; Relationship: Self -     Patient Outreach    I spoke with the patient on the phone today. Patient stated that she did have to leave the place she was previously living. She initially was living in a car but is now staying with a friend. She is unsure about the status of her insurance. I offered to give her the contact information for the insurance company. She stated she has it and plans to contact them. Patient has been sent information through [employer] by  for housing, Medicaid, food no, etc. I reminded her of this. I also reminded her that she can also reach out to  through that [employer] message, if needed.    I offered to reschedule her appointment with PCP but she declined at this time.    She stated that she does currently have her diabetic supplies and her medications.    She denies any needs at this time.    Maryam CHAMBERS  Ambulatory Case Management    5/5/2025, 11:42 EDT

## 2025-05-08 DIAGNOSIS — Z79.4 TYPE 2 DIABETES MELLITUS WITH HYPERGLYCEMIA, WITH LONG-TERM CURRENT USE OF INSULIN: Primary | ICD-10-CM

## 2025-05-08 DIAGNOSIS — E11.65 TYPE 2 DIABETES MELLITUS WITH HYPERGLYCEMIA, WITH LONG-TERM CURRENT USE OF INSULIN: Primary | ICD-10-CM

## 2025-05-13 ENCOUNTER — PATIENT OUTREACH (OUTPATIENT)
Age: 20
End: 2025-05-13
Payer: COMMERCIAL

## 2025-05-13 NOTE — OUTREACH NOTE
Patient Outreach    Patient has received resources provided for her current needs. MSW to discharge, but available if needed in the future.    Elisa CALI -   Ambulatory Case Management    5/13/2025, 08:46 EDT

## 2025-05-19 ENCOUNTER — TELEPHONE (OUTPATIENT)
Dept: GASTROENTEROLOGY | Facility: CLINIC | Age: 20
End: 2025-05-19
Payer: COMMERCIAL

## 2025-05-19 NOTE — TELEPHONE ENCOUNTER
Patient is aware that she needs to r/s her Cardic testing before we can proceed with her endoscopic procedure.     She states she will call her Cardiologist today.     Postponing Patient Call until 5/20/25

## 2025-05-20 ENCOUNTER — TELEPHONE (OUTPATIENT)
Dept: DIABETES SERVICES | Facility: HOSPITAL | Age: 20
End: 2025-05-20
Payer: COMMERCIAL

## 2025-05-20 NOTE — TELEPHONE ENCOUNTER
Pt's Cardiac testing has been r/s'd to 6/18/25. Her EGD is now rescheduled to 6/27/25 @ 9:30 am. I will check pt's chart on 6/19/25 to ensure she kept appt.     Postponing Patient Call until 6/19/25

## 2025-05-20 NOTE — TELEPHONE ENCOUNTER
Caller: RKSOLE UMANA    Relationship:SELF    Callback number: [phone]   Is it ok to leave a message: [x] Yes [] No    Requested medication for samples: DEXCOM G7    How much medication does the patient currently have left: HER PUMP IS NOT WORKING WITH HER DEXCOM SHE HAS NOW AND PHARMACY DOES NOT HAVE ANY.     Who will be picking up the samples: PATIENT OR HER FRIEND    Do you need information about patient financial assistance for this medication: [] Yes [x] No    Additional details provided:

## 2025-05-21 NOTE — TELEPHONE ENCOUNTER
Called and spoke to PT. Advised her we can give her dexcom samples if she wanted to drop by office.     Pt verbalized understanding. No concerns.

## 2025-06-02 RX ORDER — ONDANSETRON 4 MG/1
4 TABLET, ORALLY DISINTEGRATING ORAL EVERY 4 HOURS PRN
Qty: 15 TABLET | Refills: 0 | Status: SHIPPED | OUTPATIENT
Start: 2025-06-02

## 2025-06-06 ENCOUNTER — TELEPHONE (OUTPATIENT)
Dept: CASE MANAGEMENT | Facility: OTHER | Age: 20
End: 2025-06-06
Payer: COMMERCIAL

## 2025-06-18 ENCOUNTER — HOSPITAL ENCOUNTER (OUTPATIENT)
Dept: CARDIOLOGY | Facility: HOSPITAL | Age: 20
Discharge: HOME | End: 2025-06-18
Payer: COMMERCIAL

## 2025-06-18 DIAGNOSIS — R55 SYNCOPE, UNSPECIFIED SYNCOPE TYPE: ICD-10-CM

## 2025-06-18 RX ORDER — NITROGLYCERIN 0.4 MG/1
TABLET SUBLINGUAL
Status: DISPENSED
Start: 2025-06-18 | End: 2025-06-18

## 2025-06-18 RX ORDER — LISINOPRIL 40 MG/1
40 TABLET ORAL DAILY
COMMUNITY
End: 2025-06-23

## 2025-06-23 ENCOUNTER — OFFICE VISIT (OUTPATIENT)
Dept: CARDIOLOGY | Facility: CLINIC | Age: 20
End: 2025-06-23
Payer: COMMERCIAL

## 2025-06-23 VITALS
HEIGHT: 62 IN | HEART RATE: 96 BPM | DIASTOLIC BLOOD PRESSURE: 156 MMHG | WEIGHT: 198.4 LBS | SYSTOLIC BLOOD PRESSURE: 211 MMHG | BODY MASS INDEX: 36.51 KG/M2

## 2025-06-23 DIAGNOSIS — R55 SYNCOPE, UNSPECIFIED SYNCOPE TYPE: Primary | ICD-10-CM

## 2025-06-23 DIAGNOSIS — I10 WHITE COAT SYNDROME WITH DIAGNOSIS OF HYPERTENSION: ICD-10-CM

## 2025-06-23 DIAGNOSIS — I10 PRIMARY HYPERTENSION: ICD-10-CM

## 2025-06-23 DIAGNOSIS — Z01.810 PRE-OPERATIVE CARDIOVASCULAR EXAMINATION: ICD-10-CM

## 2025-06-23 PROCEDURE — 99214 OFFICE O/P EST MOD 30 MIN: CPT

## 2025-06-23 RX ORDER — ONDANSETRON 4 MG/1
4 TABLET, ORALLY DISINTEGRATING ORAL EVERY 4 HOURS PRN
Qty: 15 TABLET | Refills: 0 | Status: SHIPPED | OUTPATIENT
Start: 2025-06-23

## 2025-06-23 RX ORDER — LOSARTAN POTASSIUM 100 MG/1
100 TABLET ORAL DAILY
Qty: 90 TABLET | Refills: 3 | Status: SHIPPED | OUTPATIENT
Start: 2025-06-23

## 2025-06-23 RX ORDER — AMLODIPINE BESYLATE 10 MG/1
10 TABLET ORAL DAILY
Qty: 90 TABLET | Refills: 3 | Status: SHIPPED | OUTPATIENT
Start: 2025-06-23

## 2025-06-23 RX ORDER — CHLORTHALIDONE 25 MG/1
25 TABLET ORAL DAILY
Qty: 90 TABLET | Refills: 3 | Status: SHIPPED | OUTPATIENT
Start: 2025-06-23

## 2025-06-23 NOTE — PROGRESS NOTES
Chief Complaint  Follow-up and Hypertension    Subjective        History of Present Illness  Amarilis Vinson presents to [facility] for follow up.   Patient is a 20-year-old female with past medical history outlined below, significant for hypertension, hyperlipidemia and diabetes. She continues to experience syncopal episodes. She is seeking preoperative cardiovascular risk stratification for EGD. She notes a recent syncopal episode where she lost consciousness.  She does not remember anything surrounding the episode except that she was sitting on the couch.  She notes no chest pain or discomfort, palpitations or edema.  She denies dyspnea.  She recently went for a tilt table test which was not performed due to highly elevated blood pressure readings.  She has trouble affording her medications and has not been taking several of them.    Past Medical History:   Diagnosis Date    Anxiety     Coronary artery disease     COVID 08/07/2022    NO CURRENT SYMPTOMS    Diabetes mellitus     Fatty liver     Foot pain, left     GERD (gastroesophageal reflux disease)     Graves disease     Hyperlipidemia     Hypertension     Migraines        ALLERGY  Allergies   Allergen Reactions    Novolog [Insulin Aspart (Human Analog)] Hives        Past Surgical History:   Procedure Laterality Date    FOOT SURGERY      DISTAL LEFT FIRST METATARSAL    HARDWARE REMOVAL Left 8/26/2022    Procedure: HARDWARE REMOVAL;  left first metatarsal shaft;  Surgeon: Michel Jaffe DPM;  Location: [facility];  Service: Podiatry;  Laterality: Left;    ULNAR OSTEOPLASTY  2018        Social History     Socioeconomic History    Marital status:    Tobacco Use    Smoking status: Never     Passive exposure: Never    Smokeless tobacco: Never   Vaping Use    Vaping status: Never Used   Substance and Sexual Activity    Alcohol use: Not Currently    Drug use: Not Currently    Sexual activity: Defer     Partners: Male      Birth control/protection: Condom, Birth control pill       Family History   Adopted: Yes   Problem Relation Age of Onset    Alcohol abuse Mother         Bio mother    Alcohol abuse Father         Bio dad    Diabetes Father     Colon cancer Neg Hx         Current Outpatient Medications on File Prior to Visit   Medication Sig    acetone, urine, test strip Use 1 strip As Needed for High Blood Sugar.    Alcohol Swabs pads Use 1 Pad Daily.    aspirin-acetaminophen-caffeine (EXCEDRIN MIGRAINE) 250-250-65 MG per tablet Take  by mouth.    atorvastatin (LIPITOR) 20 MG tablet Take 1 tablet by mouth Every Night.    Continuous Glucose Sensor (Dexcom G7 Sensor) misc Use 1 each Every 10 (Ten) Days for 180 days.    levothyroxine (SYNTHROID, LEVOTHROID) 25 MCG tablet Take 1 tablet by mouth Every Morning.    Lyumjev KwikPen 200 UNIT/ML solution pen-injector Inject 80 Units under the skin into the appropriate area as directed 3 (Three) Times a Day Before Meals.    Norethin-Eth Estrad-Fe Biphas (Lo Loestrin Fe) 1 MG-10 MCG / 10 MCG tablet Take 1 tablet by mouth Daily.    omeprazole (priLOSEC) 20 MG capsule Take 1 capsule by mouth Daily.    [DISCONTINUED] chlorthalidone (HYGROTON) 25 MG tablet Take 1 tablet by mouth Daily. (Patient taking differently: Take 1 tablet by mouth Daily. Patient reports uncertain if she is taking this medication.)    [DISCONTINUED] lisinopril (PRINIVIL,ZESTRIL) 40 MG tablet Take 1 tablet by mouth Daily.    [DISCONTINUED] ondansetron ODT (ZOFRAN-ODT) 4 MG disintegrating tablet DISSOLVE 1 TABLET ON THE TONGUE EVERY 4 HOURS AS NEEDED FOR NAUSEA OR VOMITING    docusate sodium (Colace) 100 MG capsule Take 1 capsule by mouth 2 (Two) Times a Day. (Patient not taking: Reported on 6/23/2025)    pioglitazone (Actos) 45 MG tablet Take 1 tablet by mouth Daily. (Patient not taking: Reported on 6/23/2025)    [DISCONTINUED] amLODIPine (NORVASC) 10 MG tablet Take 1 tablet by mouth Daily. (Patient not taking: Reported on  "6/23/2025)    [DISCONTINUED] losartan (Cozaar) 100 MG tablet Take 1 tablet by mouth Daily. (Patient not taking: Reported on 6/23/2025)     No current facility-administered medications on file prior to visit.       Objective   Vitals:    06/23/25 1359   BP: (!) 211/156   BP Location: Left arm   Patient Position: Sitting   Cuff Size: Adult   Pulse: 96   Weight: 90 kg (198 lb 6.4 oz)   Height: 157.5 cm (62.01\")       Physical Exam  Constitutional:       General: She is awake. She is not in acute distress.     Appearance: Normal appearance.   HENT:      Head: Normocephalic.      Nose: Nose normal. No congestion.   Eyes:      Extraocular Movements: Extraocular movements intact.      Conjunctiva/sclera: Conjunctivae normal.      Pupils: Pupils are equal, round, and reactive to light.   Neck:      Thyroid: No thyromegaly.      Vascular: No JVD.   Cardiovascular:      Rate and Rhythm: Normal rate and regular rhythm.      Chest Wall: PMI is not displaced.      Pulses: Normal pulses.      Heart sounds: Normal heart sounds, S1 normal and S2 normal. No murmur heard.     No friction rub. No gallop. No S3 or S4 sounds.   Pulmonary:      Effort: Pulmonary effort is normal.      Breath sounds: Normal breath sounds. No wheezing, rhonchi or rales.   Abdominal:      General: Bowel sounds are normal.      Palpations: Abdomen is soft.      Tenderness: There is no abdominal tenderness.   Musculoskeletal:      Cervical back: No tenderness.      Right lower leg: No edema.      Left lower leg: No edema.   Lymphadenopathy:      Cervical: No cervical adenopathy.   Skin:     General: Skin is warm and dry.      Capillary Refill: Capillary refill takes less than 2 seconds.      Coloration: Skin is not cyanotic.      Findings: No petechiae or rash.      Nails: There is no clubbing.   Neurological:      Mental Status: She is alert.   Psychiatric:         Mood and Affect: Mood normal.         Behavior: Behavior is cooperative.           Result " Review     The following data was reviewed by JOSE Mclean on 06/23/25.      CMP          10/25/2024    14:27 2/26/2025    15:45 3/27/2025    23:23   CMP   Glucose 164  261  185    BUN 19  9  8    Creatinine 0.84  0.85  0.71    EGFR 101.5  99.5  123.5    Sodium 136  137  137    Potassium 3.6  4.2  4.3    Chloride 96  99  101    Calcium 10.5  10.2  9.7    Total Protein 7.7  7.5  7.9    Albumin 4.3  3.7  4.3    Globulin 3.4  3.8  3.6    Total Bilirubin <0.2  0.3  0.3    Alkaline Phosphatase 115  153  150    AST (SGOT) 109  145  108    ALT (SGPT) 52  61  69    Albumin/Globulin Ratio 1.3  1.0  1.2    BUN/Creatinine Ratio 22.6  10.6  11.3    Anion Gap 19.3  14.3  13.1      CBC w/diff          8/14/2024    10:41 2/26/2025    15:45 3/27/2025    23:23   CBC w/Diff   WBC 10.41  13.03  13.64    RBC 4.83  5.23  5.46    Hemoglobin 14.3  15.1  15.2    Hematocrit 42.4  45.3  44.8    MCV 87.8  86.6  82.1    MCH 29.6  28.9  27.8    MCHC 33.7  33.3  33.9    RDW 12.7  12.8  12.4    Platelets 311  392  371    Neutrophil Rel % 60.3  63.9  61.7    Immature Granulocyte Rel % 0.6  0.7  0.4    Lymphocyte Rel % 31.1  28.2  30.4    Monocyte Rel % 5.9  5.3  5.3    Eosinophil Rel % 1.7  1.5  1.6    Basophil Rel % 0.4  0.4  0.6       Lipid Panel          8/14/2024    10:41 2/26/2025    15:45   Lipid Panel   Total Cholesterol 235  211    Triglycerides 480  397    HDL Cholesterol 37  23    VLDL Cholesterol 84  70    LDL Cholesterol  114  118    LDL/HDL Ratio 2.76  4.72            No results found for this or any previous visit.          Procedures      Assessment & Plan  Syncope, unspecified syncope type  Patient continues with syncopal episodes.  Tilt table test was canceled due to highly elevated blood pressure.  Will check an echocardiogram.  Primary hypertension  Blood pressure is uncontrolled.  She is not taking her blood pressure medications.  She reports issues with insurance and financial concerns and is not able to afford her  medications.  She was recently set up with a new insurance and wants to touch see if they will cover her prescriptions.  I sent a new prescription for chlorthalidone, amlodipine and losartan to her pharmacy.  In the meantime I recommend a 24-hour ambulatory blood pressure monitor to determine average blood pressure readings at home as I feel there is a component of whitecoat syndrome contributing to these elevated readings.  White coat syndrome with diagnosis of hypertension  Recommend 24-hour ambulatory blood pressure monitoring.  Pre-operative cardiovascular examination  Hold clearance until further testing.                     Follow Up   Return in about 6 weeks (around 8/4/2025) for With JOSE Bello.    Patient was given instructions and counseling regarding her condition or for health maintenance advice. Please see specific information pulled into the AVS if appropriate.     JOSE Mclean  06/23/25  14:17 EDT    Dictated Utilizing Dragon Dictation

## 2025-06-23 NOTE — ASSESSMENT & PLAN NOTE
Blood pressure is uncontrolled.  She is not taking her blood pressure medications.  She reports issues with insurance and financial concerns and is not able to afford her medications.  She was recently set up with a new insurance and wants to touch see if they will cover her prescriptions.  I sent a new prescription for chlorthalidone, amlodipine and losartan to her pharmacy.  In the meantime I recommend a 24-hour ambulatory blood pressure monitor to determine average blood pressure readings at home as I feel there is a component of whitecoat syndrome contributing to these elevated readings.

## 2025-07-08 ENCOUNTER — TELEPHONE (OUTPATIENT)
Dept: CASE MANAGEMENT | Facility: OTHER | Age: 20
End: 2025-07-08
Payer: COMMERCIAL

## 2025-07-09 ENCOUNTER — TELEPHONE (OUTPATIENT)
Dept: CASE MANAGEMENT | Facility: OTHER | Age: 20
End: 2025-07-09
Payer: COMMERCIAL

## 2025-07-09 NOTE — TELEPHONE ENCOUNTER
Attempted to call patient. Left voicemail. UTR X 3. I am closing patient's HRCM program for loss of contact.

## 2025-07-11 DIAGNOSIS — Z79.4 TYPE 2 DIABETES MELLITUS WITH HYPERGLYCEMIA, WITH LONG-TERM CURRENT USE OF INSULIN: ICD-10-CM

## 2025-07-11 DIAGNOSIS — E11.65 TYPE 2 DIABETES MELLITUS WITH HYPERGLYCEMIA, WITH LONG-TERM CURRENT USE OF INSULIN: ICD-10-CM

## 2025-07-11 RX ORDER — INSULIN LISPRO-AABC 200 [IU]/ML
80 INJECTION, SOLUTION SUBCUTANEOUS
Qty: 36 ML | Refills: 5 | Status: SHIPPED | OUTPATIENT
Start: 2025-07-11

## 2025-07-11 RX ORDER — ACYCLOVIR 400 MG/1
1 TABLET ORAL
Qty: 3 EACH | Refills: 5 | Status: SHIPPED | OUTPATIENT
Start: 2025-07-11 | End: 2026-01-07

## 2025-07-24 ENCOUNTER — PATIENT MESSAGE (OUTPATIENT)
Dept: NEUROLOGY | Facility: CLINIC | Age: 20
End: 2025-07-24
Payer: COMMERCIAL

## 2025-08-08 DIAGNOSIS — Z78.9 USES BIRTH CONTROL: ICD-10-CM

## 2025-08-08 RX ORDER — NORETHINDRONE ACETATE AND ETHINYL ESTRADIOL, ETHINYL ESTRADIOL AND FERROUS FUMARATE 1MG-10(24)
1 KIT ORAL DAILY
Qty: 28 TABLET | Refills: 3 | Status: SHIPPED | OUTPATIENT
Start: 2025-08-08

## (undated) DEVICE — PENCL E/S SMOKEEVAC W/TELESCP CANN

## (undated) DEVICE — GLV SURG ULTRAFREE MAX PF LTX SZ9

## (undated) DEVICE — BANDAGE,GAUZE,BULKEE II,4.5"X4.1YD,STRL: Brand: MEDLINE

## (undated) DEVICE — SHOE P/OP/CAST O/T FML LG 8/10

## (undated) DEVICE — STANDARD HYPODERMIC NEEDLE,POLYPROPYLENE HUB: Brand: MONOJECT

## (undated) DEVICE — EXTREMITY-LF: Brand: MEDLINE INDUSTRIES, INC.

## (undated) DEVICE — DRSNG WND GZ CURAD OIL EMULSION 3X3IN STRL

## (undated) DEVICE — DRSNG SURG AQUACEL AG 9X10CM

## (undated) DEVICE — SYR LL TP 10ML STRL

## (undated) DEVICE — GOWN,REINF,POLY,SIRUS,BRTH SLV,XLNG/XXL: Brand: MEDLINE

## (undated) DEVICE — BNDG ESMARK 4IN 12FT LF STRL BLU

## (undated) DEVICE — INTENDED FOR TISSUE SEPARATION, AND OTHER PROCEDURES THAT REQUIRE A SHARP SURGICAL BLADE TO PUNCTURE OR CUT.: Brand: BARD-PARKER ® CARBON RIB-BACK BLADES

## (undated) DEVICE — 1010 S-DRAPE TOWEL DRAPE 10/BX: Brand: STERI-DRAPE™

## (undated) DEVICE — BNDG ELAS DELUXE REINF 10CM 5MTR STRL

## (undated) DEVICE — UNDERCAST PADDING: Brand: DEROYAL

## (undated) DEVICE — ANTIBACTERIAL UNDYED BRAIDED (POLYGLACTIN 910), SYNTHETIC ABSORBABLE SUTURE: Brand: COATED VICRYL

## (undated) DEVICE — BNDG GZ SOF-FORM CONFRM 3X75IN LF STRL

## (undated) DEVICE — SUT VIC 2/0 SH 27IN

## (undated) DEVICE — GLV SURG SENSICARE SLT PF LF 9 STRL

## (undated) DEVICE — Device

## (undated) DEVICE — GAUZE,SPONGE,4"X4",16PLY,STRL,LF,10/TRAY: Brand: MEDLINE